# Patient Record
Sex: FEMALE | Race: WHITE | Employment: OTHER | URBAN - METROPOLITAN AREA
[De-identification: names, ages, dates, MRNs, and addresses within clinical notes are randomized per-mention and may not be internally consistent; named-entity substitution may affect disease eponyms.]

---

## 2017-01-03 ENCOUNTER — HOSPITAL ENCOUNTER (OUTPATIENT)
Dept: PHYSICAL THERAPY | Age: 77
Discharge: HOME | End: 2017-01-03
Payer: MEDICARE

## 2017-01-03 PROCEDURE — 97110 THERAPEUTIC EXERCISES: CPT

## 2017-01-03 PROCEDURE — 97140 MANUAL THERAPY 1/> REGIONS: CPT

## 2017-01-03 NOTE — PROGRESS NOTES
PHYSICAL THERAPY - DAILY TREATMENT NOTE    Patient Name: Yaquelin Negrete        Date: 1/3/2017  : 1940    Patient  Verified: YES  Visit #:   5   of      Insurance: Payor: [place] [employer] / Plan: [place] MEDICARE PART A & B / Product Type: Medicare /      In time: 2 Out time: 1125   Total Treatment Time: 55     Medicare Time Tracking (below)   Total Timed Codes (min):  40 1:1 Treatment Time:  40     TREATMENT AREA/ DIAGNOSIS = Left knee pain [M25.562]    SUBJECTIVE  Pain Level (on 0 to 10 scale):  7  / 10   Medication Changes/New allergies or changes in medical history, any new surgeries or procedures?     NO    If yes, update Summary List   Subjective Functional Status/Changes:  []  No changes reported     Functional improvement:  Driving   Functional limitation:  stairs     OBJECTIVE  Modalities Rationale:   x decrease edema/ inflammation  x decrease pain  x  increase tissue extensibility    x increase muscle performance    decrease neural compromise  to improve patient's ability to   x perform ADLs   x  ambulate  x perform work    relaxation/ sleep      min [] Estim, type/location:                                      []  att     []  unatt     []  w/US     []  w/ice    []  w/heat    min []  Mechanical Traction: type/lbs                   []  pro   []  sup   []  int   []  cont    []  before manual    []  after manual    min []  Ultrasound, settings/location:      min []  Iontophoresis w/ dexamethasone, location:                                               []  take home patch       []  in clinic   10 min x Ice        Heat    location/position:     min []  Vasopneumatic Device, press/temp:     min []  Other:    [] Skin assessment post-treatment (if applicable):    []  intact    []  redness- no adverse reaction     []redness  adverse reaction:        30 min Therapeutic Exercise:  [x]  See flow sheet   Rationale:   x increase ROM   x increase strength   x increase endurance     x increase motor control Other  to improve patients ability to x perform ADLs   x ambulate   x perform work    relaxation/ sleep     15 min Manual Therapy: Technique:        x STM    ASTM    TPR  x PROM    x Stretching     Jt manipulation  x Gr I.   x II.    x  III. IV.     V.  Treatment Area: Passive Range of Motion for flexion and extension. Extension and Patellar mobilization. Other:   Rationale:   x decrease pain   x decrease TP   x increase ROM/ mobility   x increase tissue extensibility       decrease edema     reduce disc   postural correction   other     to improve patient's ability to  x perform ADLs    x ambulate   x perform work     relaxation/ sleep        min Therapeutic Activity:  [x] see flow sheet   Rationale:  x increase ROM    x increase strength    x increase balance/ proprioception    x increase motor control    other   to improve patients ability to   x perform ADLs   x ambulate   x perform work                relaxation/ sleep       min Neuromuscular Re-ed:  [x] see flow sheet   Rationale:   x increase ROM    x increase strength    x increase balance/ proprioception  x increase motor control    x improve safety   other    to improve patients ability to   x perform ADLs    x ambulate   x perform work    relaxation/ sleep                min Gait Training: To improve patients ability to:    [] perform ADLs   [] ambulate       min Patient Education:  Yes    [x] Reviewed HEP   []  Progressed/Changed HEP based on: Other Objective/Functional Measures:    PROM 3-98 deg  Patient education for postural awareness, body mechanics, and lifting techniques        Post Treatment Pain Level (on 0 to 10) scale:   6  / 10     ASSESSMENT  []  See Progress Note/Recertification      Patient will continue to benefit from skilled PT services to modify and progress therapeutic interventions, address functional mobility deficits, address ROM deficits and address strength deficits to attain remaining goals.   Pt will be independent and compliant with HEP.   States compliance and ind   Progress toward goals / Updated goals:    [x] decr pain   [x]inc stability   []inc balance [x] inc ROM[x] inc strength  [] centralizing symptoms                    [x] progressing  Function  [x] progressing towards LTGs            [x]  progressing HEP       PLAN  [x]  Upgrade activities as tolerated YES Continue plan of care   []  Discharge due to :    []  Other:       Therapist: Maximilian Lozoya PTA    Date: 1/3/2017 Time: 5:45 PM

## 2017-01-04 ENCOUNTER — HOSPITAL ENCOUNTER (OUTPATIENT)
Dept: PHYSICAL THERAPY | Age: 77
Discharge: HOME | End: 2017-01-04
Payer: MEDICARE

## 2017-01-04 PROCEDURE — 97530 THERAPEUTIC ACTIVITIES: CPT

## 2017-01-04 PROCEDURE — 97110 THERAPEUTIC EXERCISES: CPT

## 2017-01-04 PROCEDURE — 97140 MANUAL THERAPY 1/> REGIONS: CPT

## 2017-01-04 NOTE — PROGRESS NOTES
PHYSICAL THERAPY - DAILY TREATMENT NOTE    Patient Name: Patricia Rodriguez        Date: 2017  : 1940    Patient  Verified: YES  Visit #:   6   of      Insurance: Payor: Linder Cheadle / Plan: [place] MEDICARE PART A & B / Product Type: Medicare /      In time: 10 Out time: 1110   Total Treatment Time: 70     Medicare Time Tracking (below)   Total Timed Codes (min):  60 1:1 Treatment Time:  55     TREATMENT AREA/ DIAGNOSIS = Left knee pain [M25.562]    SUBJECTIVE  Pain Level (on 0 to 10 scale):  7  / 10   Medication Changes/New allergies or changes in medical history, any new surgeries or procedures?     NO    If yes, update Summary List   Subjective Functional Status/Changes:  []  No changes reported     Functional improvement:  Easier getting in and out of the car  Functional limitation:  stairs     OBJECTIVE  Modalities Rationale:   x decrease edema/ inflammation  x decrease pain  x  increase tissue extensibility    x increase muscle performance    decrease neural compromise  to improve patient's ability to   x perform ADLs   x  ambulate  x perform work    relaxation/ sleep      min [] Estim, type/location:                                      []  att     []  unatt     []  w/US     []  w/ice    []  w/heat    min []  Mechanical Traction: type/lbs                   []  pro   []  sup   []  int   []  cont    []  before manual    []  after manual    min []  Ultrasound, settings/location:      min []  Iontophoresis w/ dexamethasone, location:                                               []  take home patch       []  in clinic   10 min x Ice        Heat    location/position:     min []  Vasopneumatic Device, press/temp:     min []  Other:    [] Skin assessment post-treatment (if applicable):    []  intact    []  redness- no adverse reaction     []redness  adverse reaction:        30 min Therapeutic Exercise:  [x]  See flow sheet   Rationale:   x increase ROM   x increase strength   x increase endurance     x increase motor control    Other  to improve patients ability to x perform ADLs   x ambulate   x perform work    relaxation/ sleep     15 min Manual Therapy: Technique:        x STM    ASTM    TPR  x PROM    x Stretching     Jt manipulation  x Gr I.   x II.    x  III. IV.     V.  Treatment Area: Passive Range of Motion for flexion and extension. Extension and Patellar mobilization. Other:   Rationale:   x decrease pain   x decrease TP   x increase ROM/ mobility   x increase tissue extensibility       decrease edema     reduce disc   postural correction   other     to improve patient's ability to  x perform ADLs    x ambulate   x perform work     relaxation/ sleep      15  min Therapeutic Activity:  [x] see flow sheet   Rationale:  x increase ROM    x increase strength    x increase balance/ proprioception    x increase motor control    other   to improve patients ability to   x perform ADLs   x ambulate   x perform work                relaxation/ sleep       min Neuromuscular Re-ed:  [x] see flow sheet   Rationale:   x increase ROM    x increase strength    x increase balance/ proprioception  x increase motor control    x improve safety   other    to improve patients ability to   x perform ADLs    x ambulate   x perform work    relaxation/ sleep                min Gait Training: To improve patients ability to:    [] perform ADLs   [] ambulate       min Patient Education:  Yes    [x] Reviewed HEP   []  Progressed/Changed HEP based on:         Other Objective/Functional Measures:    PROM 3-98 deg   Patient education for postural awareness, body mechanics, and lifting techniques        Post Treatment Pain Level (on 0 to 10) scale:   6 / 10     ASSESSMENT  []  See Progress Note/Recertification      Patient will continue to benefit from skilled PT services to modify and progress therapeutic interventions, address functional mobility deficits, address ROM deficits and address strength deficits to attain remaining goals. Pt will be independent and compliant with HEP.   States compliance and ind   Progress toward goals / Updated goals:    [x] decr pain   [x]inc stability   []inc balance [x] inc ROM[x] inc strength  [] centralizing symptoms                    [x] progressing  Function  [x] progressing towards LTGs            [x]  progressing HEP       PLAN  [x]  Upgrade activities as tolerated YES Continue plan of care   []  Discharge due to :    []  Other:       Therapist: Gabriel Bird PTA    Date: 1/4/2017 Time: 5:45 PM

## 2017-01-06 ENCOUNTER — HOSPITAL ENCOUNTER (OUTPATIENT)
Dept: PHYSICAL THERAPY | Age: 77
Discharge: HOME | End: 2017-01-06
Payer: MEDICARE

## 2017-01-06 PROCEDURE — 97140 MANUAL THERAPY 1/> REGIONS: CPT

## 2017-01-06 PROCEDURE — 97110 THERAPEUTIC EXERCISES: CPT

## 2017-01-06 PROCEDURE — 97116 GAIT TRAINING THERAPY: CPT

## 2017-01-06 NOTE — PROGRESS NOTES
PHYSICAL THERAPY - DAILY TREATMENT NOTE    Patient Name: Andreia Yates        Date: 2017  : 1940   yes Patient  Verified  Visit #:   6   of   -  Insurance: Payor: [employer] MEDICARE / Plan: [employer] MEDICARE PART A & B / Product Type: Medicare /      In time: 1000 Out time: 509   Total Treatment Time: 65 mins     Medicare Time Tracking (below)   Total Timed Codes (min):  55 1:1 Treatment Time:  55 mins     TREATMENT AREA =  Left knee pain [M25.562]    SUBJECTIVE  Pain Level (on 0 to 10 scale):  5  / 10   Medication Changes/New allergies or changes in medical history, any new surgeries or procedures?    no  If yes, update Summary List   Subjective Functional Status/Changes:  -  No changes reported   Went to get a manicure this AM and had a lot of pain in her L quadricep when standing up. Has not taken her Mobic yet this AM, took 3 tylenol. Does not take any pain meds at night and has difficulty getting comfortable. C/o of dull, achy constant pain. OBJECTIVE  Modalities Rationale:     decrease edema and decrease inflammation to improve patient's ability to complete ADLs and ROM.    min [] Estim, type/location:                                      []  att     []  unatt     []  w/US     []  w/ice    []  w/heat    min []  Mechanical Traction: type/lbs                   []  pro   []  sup   []  int   []  cont    []  before manual    []  after manual    min []  Ultrasound, settings/location:      min []  Iontophoresis w/ dexamethasone, location:                                               []  take home patch       []  in clinic   10 min [x]  Ice     []  Heat    location/position:     min []  Vasopneumatic Device, press/temp:     min []  Other:    [] Skin assessment post-treatment (if applicable):    [x]  intact    []  redness- no adverse reaction     []redness  adverse reaction:        25 min Therapeutic Exercise:  [x]  See flow sheet   Rationale:      increase ROM, improve balance and increase proprioception to improve the patients ability to increase ROM, strength, and endurance. 15 min Manual Therapy: Scar massage; patellar mobs - grade II-III; PROM knee flexion, extension. MLD to L inguinals, medial to lateral thigh, and distal to proximal LE. Rationale:      decrease pain, increase ROM, increase tissue extensibility and decrease trigger points to improve patient's ability to perform ADLs. 15 min Gait Training: Tandem walking with 1 UE support. Increased width of ANH for gait. Rationale:  Improve patient's ability to ambulate with increased stability and decrease compensatory strategies of gait. min Patient Education:  yes  Reviewed HEP   [x]  Progressed/Changed HEP based on: Added tandem balance with 2 UE support. Other Objective/Functional Measures:    Ecchymosis decreasing in medial knee, mild warmth over knee. No redness.     Girth (cm) Right Left   6 in above -- 47.6   4 in above 42.4 45.1   2 in above 40.4 44   Midpatellar line 39.3 41.4   2 in below 35.6 39.2   6 in below 29.3 34.9   8 in below -- 31      AROM RIGHT LEFT   Knee flexion  100°   Knee extension   -6°      PROM RIGHT LEFT   Knee flexion  100°   Knee extension  -4°      Post Treatment Pain Level (on 0 to 10) scale:   0  / 10     ASSESSMENT  Assessment/Changes in Function:   Pt demonstrates an excellent improvement of L knee edema and improved ROM. Pt completing all ADLs independently and using no AD for ambulation. []  See Progress Note/Recertification   Patient will continue to benefit from skilled PT services to modify and progress therapeutic interventions, address functional mobility deficits, address ROM deficits, address strength deficits, analyze and address soft tissue restrictions and analyze and cue movement patterns to attain remaining goals. Progress toward goals / Updated goals:  Pain reported as 0/10 at the end of treatment today. Improved ROM and decreased edema overall.  HEP compliance limited - discussed ways to improve compliance.      PLAN  [x]  Upgrade activities as tolerated yes Continue plan of care   []  Discharge due to :    []  Other:      Therapist: Bar Blake PT    Date: 1/6/2017 Time: 10:01 AM     Future Appointments  Date Time Provider Jim Pendleton   1/9/2017 11:30 AM Bar Blake PT [facility]   1/11/2017 10:30 AM Tang Cason PTA [facility]   1/13/2017 10:30 AM Tang Cason PTA [facility]   1/16/2017 10:30 AM Tang Cason PTA [facility]   1/18/2017 10:30 AM Tang Cason PTA [facility]   1/20/2017 10:30 AM Tang Cason PTA [facility]   1/23/2017 1:30 PM Bar Blake PT [facility]   1/24/2017 10:30 AM Tang Cason PTA [facility]   1/27/2017 10:30 AM Tang Cason PTA [facility]   1/30/2017 11:30 AM Bar Blake PT [facility]

## 2017-01-11 ENCOUNTER — HOSPITAL ENCOUNTER (OUTPATIENT)
Dept: PHYSICAL THERAPY | Age: 77
Discharge: HOME | End: 2017-01-11
Payer: MEDICARE

## 2017-01-11 PROCEDURE — 97110 THERAPEUTIC EXERCISES: CPT

## 2017-01-11 PROCEDURE — 97140 MANUAL THERAPY 1/> REGIONS: CPT

## 2017-01-11 PROCEDURE — 97112 NEUROMUSCULAR REEDUCATION: CPT

## 2017-01-11 NOTE — PROGRESS NOTES
PHYSICAL THERAPY - DAILY TREATMENT NOTE    Patient Name: Patricia Rodriguez        Date: 2017  : 1940    Patient  Verified: YES  Visit #:   8   of      Insurance: Payor: Linder Cheadle / Plan: [facility] MEDICARE PART A & B / Product Type: Medicare /      In time:  Out time: 1140   Total Treatment Time: 70     Medicare Time Tracking (below)   Total Timed Codes (min):  60 1:1 Treatment Time:  55     TREATMENT AREA/ DIAGNOSIS = Left knee pain [M25.562]    SUBJECTIVE  Pain Level (on 0 to 10 scale):  7  / 10   Medication Changes/New allergies or changes in medical history, any new surgeries or procedures?     NO    If yes, update Summary List   Subjective Functional Status/Changes:  []  No changes reported     Functional improvement:  Walking with it straighter   Functional limitation:  Sore from just staying in the house the last few days     OBJECTIVE  Modalities Rationale:   x decrease edema/ inflammation  x decrease pain  x  increase tissue extensibility    x increase muscle performance    decrease neural compromise  to improve patient's ability to   x perform ADLs   x  ambulate  x perform work    relaxation/ sleep      min [] Estim, type/location:                                      []  att     []  unatt     []  w/US     []  w/ice    []  w/heat    min []  Mechanical Traction: type/lbs                   []  pro   []  sup   []  int   []  cont    []  before manual    []  after manual    min []  Ultrasound, settings/location:      min []  Iontophoresis w/ dexamethasone, location:                                               []  take home patch       []  in clinic   10 min x Ice        Heat    location/position:     min []  Vasopneumatic Device, press/temp:     min []  Other:    [] Skin assessment post-treatment (if applicable):    []  intact    []  redness- no adverse reaction     []redness  adverse reaction:        30 min Therapeutic Exercise:  [x]  See flow sheet   Rationale:   x increase ROM   x increase strength   x increase endurance     x increase motor control    Other  to improve patients ability to x perform ADLs   x ambulate   x perform work    relaxation/ sleep     15 min Manual Therapy: Technique:        x STM    ASTM    TPR  x PROM    x Stretching     Jt manipulation  x Gr I.   x II.    x  III. IV.     V.  Treatment Area: Passive Range of Motion for flexion and extension. Extension and Patellar mobilization. Other:   Rationale:   x decrease pain   x decrease TP   x increase ROM/ mobility   x increase tissue extensibility       decrease edema     reduce disc   postural correction   other     to improve patient's ability to  x perform ADLs    x ambulate   x perform work     relaxation/ sleep      15  min Therapeutic Activity:  [x] see flow sheet   Rationale:  x increase ROM    x increase strength    x increase balance/ proprioception    x increase motor control    other   to improve patients ability to   x perform ADLs   x ambulate   x perform work                relaxation/ sleep       min Neuromuscular Re-ed:  [x] see flow sheet   Rationale:   x increase ROM    x increase strength    x increase balance/ proprioception  x increase motor control    x improve safety   other    to improve patients ability to   x perform ADLs    x ambulate   x perform work    relaxation/ sleep                min Gait Training: To improve patients ability to:    [] perform ADLs   [] ambulate       min Patient Education:  Yes    [x] Reviewed HEP   []  Progressed/Changed HEP based on:         Other Objective/Functional Measures:    PROM 2-105 deg   Patient education for postural awareness, body mechanics, and lifting techniques        Post Treatment Pain Level (on 0 to 10) scale:   3  / 10     ASSESSMENT  []  See Progress Note/Recertification      Patient will continue to benefit from skilled PT services to modify and progress therapeutic interventions, address functional mobility deficits, address ROM deficits and address strength deficits to attain remaining goals. Pt will be independent and compliant with HEP.   States compliance and ind   Progress toward goals / Updated goals:    [x] decr pain   [x]inc stability   []inc balance [x] inc ROM[x] inc strength  [] centralizing symptoms                    [x] progressing  Function  [x] progressing towards LTGs            [x]  progressing HEP       PLAN  [x]  Upgrade activities as tolerated YES Continue plan of care   []  Discharge due to :    []  Other:       Therapist: Krupa Rodney PTA    Date: 1/11/2017 Time: 5:45 PM

## 2017-01-13 ENCOUNTER — HOSPITAL ENCOUNTER (OUTPATIENT)
Dept: PHYSICAL THERAPY | Age: 77
Discharge: HOME | End: 2017-01-13
Payer: MEDICARE

## 2017-01-13 PROCEDURE — 97140 MANUAL THERAPY 1/> REGIONS: CPT

## 2017-01-13 PROCEDURE — 97112 NEUROMUSCULAR REEDUCATION: CPT

## 2017-01-13 PROCEDURE — 97110 THERAPEUTIC EXERCISES: CPT

## 2017-01-13 NOTE — PROGRESS NOTES
PHYSICAL THERAPY - DAILY TREATMENT NOTE    Patient Name: Liu Chapa        Date: 2017  : 1940    Patient  Verified: YES  Visit #:   9   of      Insurance: Payor: Beckie Jimenez / Plan: [facility] MEDICARE PART A & B / Product Type: Medicare /      In time: 5750 Out time: 12   Total Treatment Time: 75     Medicare Time Tracking (below)   Total Timed Codes (min):  65 1:1 Treatment Time:  55     TREATMENT AREA/ DIAGNOSIS = Left knee pain [M25.562]    SUBJECTIVE  Pain Level (on 0 to 10 scale):  7  / 10   Medication Changes/New allergies or changes in medical history, any new surgeries or procedures?     NO    If yes, update Summary List   Subjective Functional Status/Changes:  []  No changes reported     Functional improvement:  Walking with it straighter   Functional limitation:  Sore from just staying in the house the last few days     OBJECTIVE  Modalities Rationale:   x decrease edema/ inflammation  x decrease pain  x  increase tissue extensibility    x increase muscle performance    decrease neural compromise  to improve patient's ability to   x perform ADLs   x  ambulate  x perform work    relaxation/ sleep      min [] Estim, type/location:                                      []  att     []  unatt     []  w/US     []  w/ice    []  w/heat    min []  Mechanical Traction: type/lbs                   []  pro   []  sup   []  int   []  cont    []  before manual    []  after manual    min []  Ultrasound, settings/location:      min []  Iontophoresis w/ dexamethasone, location:                                               []  take home patch       []  in clinic   10 min x Ice        Heat    location/position:     min []  Vasopneumatic Device, press/temp:     min []  Other:    [] Skin assessment post-treatment (if applicable):    []  intact    []  redness- no adverse reaction     []redness  adverse reaction:        30 min Therapeutic Exercise:  [x]  See flow sheet   Rationale:   x increase ROM   x increase strength   x increase endurance     x increase motor control    Other  to improve patients ability to x perform ADLs   x ambulate   x perform work    relaxation/ sleep     15 min Manual Therapy: Technique:        x STM    ASTM    TPR  x PROM    x Stretching     Jt manipulation  x Gr I.   x II.    x  III. IV.     V.  Treatment Area: Passive Range of Motion for flexion and extension. Extension and Patellar mobilization. Other:   Rationale:   x decrease pain   x decrease TP   x increase ROM/ mobility   x increase tissue extensibility       decrease edema     reduce disc   postural correction   other     to improve patient's ability to  x perform ADLs    x ambulate   x perform work     relaxation/ sleep        min Therapeutic Activity:  [x] see flow sheet   Rationale:  x increase ROM    x increase strength    x increase balance/ proprioception    x increase motor control    other   to improve patients ability to   x perform ADLs   x ambulate   x perform work                relaxation/ sleep     20  min Neuromuscular Re-ed:  [x] see flow sheet   Rationale:   x increase ROM    x increase strength    x increase balance/ proprioception  x increase motor control    x improve safety   other    to improve patients ability to   x perform ADLs    x ambulate   x perform work    relaxation/ sleep                min Gait Training: To improve patients ability to:    [] perform ADLs   [] ambulate       min Patient Education:  Yes    [x] Reviewed HEP   []  Progressed/Changed HEP based on: Other Objective/Functional Measures:     Added TG squats   Patient education for postural awareness, body mechanics, and lifting techniques        Post Treatment Pain Level (on 0 to 10) scale:   3  / 10     ASSESSMENT  []  See Progress Note/Recertification      Patient will continue to benefit from skilled PT services to modify and progress therapeutic interventions, address functional mobility deficits, address ROM deficits and address strength deficits to attain remaining goals. Pt will be independent and compliant with HEP.   States compliance and ind   Progress toward goals / Updated goals:    [x] decr pain   [x]inc stability   []inc balance [x] inc ROM[x] inc strength  [] centralizing symptoms                    [x] progressing  Function  [x] progressing towards LTGs            [x]  progressing HEP       PLAN  [x]  Upgrade activities as tolerated YES Continue plan of care   []  Discharge due to :    []  Other:       Therapist: Valerie Parham PTA    Date: 1/13/2017 Time: 5:45 PM

## 2017-01-16 ENCOUNTER — HOSPITAL ENCOUNTER (OUTPATIENT)
Dept: PHYSICAL THERAPY | Age: 77
Discharge: HOME | End: 2017-01-16
Payer: MEDICARE

## 2017-01-16 PROCEDURE — 97140 MANUAL THERAPY 1/> REGIONS: CPT

## 2017-01-16 PROCEDURE — 97112 NEUROMUSCULAR REEDUCATION: CPT

## 2017-01-16 PROCEDURE — 97110 THERAPEUTIC EXERCISES: CPT

## 2017-01-18 ENCOUNTER — HOSPITAL ENCOUNTER (OUTPATIENT)
Dept: PHYSICAL THERAPY | Age: 77
Discharge: HOME | End: 2017-01-18
Payer: MEDICARE

## 2017-01-18 PROCEDURE — 97140 MANUAL THERAPY 1/> REGIONS: CPT

## 2017-01-18 PROCEDURE — 97110 THERAPEUTIC EXERCISES: CPT

## 2017-01-18 NOTE — PROGRESS NOTES
PHYSICAL THERAPY - DAILY TREATMENT NOTE    Patient Name: Cabrera Vargas        Date: 2017  : 1940    Patient  Verified: YES  Visit #:   10   of   16  Insurance: Payor: Munir Weber / Plan: [place] MED[place] A & B / Product Type: Medicare /      In time: 10:35 Out time: 11:30   Total Treatment Time: 54     Medicare Time Tracking (below)   Total Timed Codes (min):  45 1:1 Treatment Time:  25     TREATMENT AREA/ DIAGNOSIS = Left knee pain [M25.562]    SUBJECTIVE  Pain Level (on 0 to 10 scale):  0  / 10   Medication Changes/New allergies or changes in medical history, any new surgeries or procedures?     NO    If yes, update Summary List   Subjective Functional Status/Changes:  []  No changes reported     Functional improvement    Functional limitation i feel like I wasted this first month      OBJECTIVE  Modalities Rationale:     decrease edema, decrease inflammation and decrease pain to improve patient's ability to perform ADLs without pain   min [] Estim, type/location:                                      []  att     []  unatt     []  w/US     []  w/ice    []  w/heat    min []  Mechanical Traction: type/lbs                   []  pro   []  sup   []  int   []  cont    []  before manual    []  after manual    min []  Ultrasound, settings/location:      min []  Iontophoresis w/ dexamethasone, location:                                               []  take home patch       []  in clinic   10 min [x]  Ice     []  Heat    location/position:     min []  Vasopneumatic Device, press/temp:     min []  Other:    [] Skin assessment post-treatment (if applicable):    []  intact    []  redness- no adverse reaction     []redness  adverse reaction:        10 min Manual Therapy: CFM to scar, patellar mobs, DTM to L quad, passive extension   Rationale:      decrease pain, increase ROM, increase tissue extensibility and decrease edema  to improve patient's ability to perform ADLs without pain    35 min Therapeutic Exercise:  [x]  See flow sheet   Rationale:      increase ROM, increase strength and improve coordination to improve the patients ability to perform ADLs without pain          min Patient Education:  Yes    [x] Reviewed HEP   []  Progressed/Changed HEP based on: Other Objective/Functional Measures:    Pt with - 10 degrees of active extension, able to get full active ext after adding ext hang and manual  AAROM flex to 113 degrees   Post Treatment Pain Level (on 0 to 10) scale:   0  / 10     ASSESSMENT  Assessment/Changes in Function:     Needs to work on extension     []  See Progress Note/Recertification   Patient will continue to benefit from skilled PT services to modify and progress therapeutic interventions, address functional mobility deficits, address ROM deficits, address strength deficits, analyze and address soft tissue restrictions, analyze and cue movement patterns, analyze and modify body mechanics/ergonomics and assess and modify postural abnormalities to attain remaining goals.    Progress toward goals / Updated goals:    Walking with good heel strike after therapy     PLAN  [x]  Upgrade activities as tolerated YES Continue plan of care   []  Discharge due to :    []  Other:      Therapist: Susana Limon PT    Date: 1/18/2017 Time: 2:49 PM        Future Appointments  Date Time Provider Jim Pendleton   1/20/2017 12:00 PM Susana Limon PT REHAB [facility] AT [facility]   1/23/2017 1:30 PM Al Herron PT REHAB CENTER AT [facility]   1/24/2017 11:30 AM Susana Limon PT REHAB CENTER AT [facility]   1/27/2017 10:30 AM Sher Jones PTA REHAB CENTER AT [facility]   1/30/2017 11:30 AM Al Herron PT REHAB CENTER AT [facility]

## 2017-01-20 ENCOUNTER — HOSPITAL ENCOUNTER (OUTPATIENT)
Dept: PHYSICAL THERAPY | Age: 77
Discharge: HOME | End: 2017-01-20
Payer: MEDICARE

## 2017-01-20 PROCEDURE — G8979 MOBILITY GOAL STATUS: HCPCS

## 2017-01-20 PROCEDURE — 97140 MANUAL THERAPY 1/> REGIONS: CPT

## 2017-01-20 PROCEDURE — 97110 THERAPEUTIC EXERCISES: CPT

## 2017-01-20 PROCEDURE — G8978 MOBILITY CURRENT STATUS: HCPCS

## 2017-01-20 NOTE — PROGRESS NOTES
PHYSICAL THERAPY - DAILY TREATMENT NOTE    Patient Name: Yasmani Denson        Date: 2017  : 1940    Patient  Verified: YES  Visit #:     Insurance: Payor: Chely Montano / Plan: [place] MEDICARE PART A & B / Product Type: Medicare /      In time: 12:15 Out time: 1:05   Total Treatment Time: 50     Medicare Time Tracking (below)   Total Timed Codes (min):  50 1:1 Treatment Time:  50     TREATMENT AREA/ DIAGNOSIS = Left knee pain [M25.562]    SUBJECTIVE  Pain Level (on 0 to 10 scale):  3  / 10   Medication Changes/New allergies or changes in medical history, any new surgeries or procedures?     NO    If yes, update Summary List   Subjective Functional Status/Changes:  []  No changes reported     Functional improvement    Functional limitation i was sore and swollen after the last visit       OBJECTIVE  Modalities Rationale:     decrease edema, decrease inflammation and decrease pain to improve patient's ability to -   min [] Estim, type/location:                                      []  att     []  unatt     []  w/US     []  w/ice    []  w/heat    min []  Mechanical Traction: type/lbs                   []  pro   []  sup   []  int   []  cont    []  before manual    []  after manual    min []  Ultrasound, settings/location:      min []  Iontophoresis w/ dexamethasone, location:                                               []  take home patch       []  in clinic   PD min []  Ice     []  Heat    location/position:     min []  Vasopneumatic Device, press/temp:     min []  Other:    [] Skin assessment post-treatment (if applicable):    []  intact    []  redness- no adverse reaction     []redness  adverse reaction:        10 min Manual Therapy: Retrograde massage, DTM to L knee CFM to incision, passive L knrr ext    Rationale:      decrease pain, increase ROM, increase tissue extensibility and decrease edema  to improve patient's ability to perform ADLs without pain    40 min Therapeutic Exercise:  [x] See flow sheet   Rationale:      increase ROM and increase strength to improve the patients ability to perform ADLs without pain          min Patient Education:  Yes    [x] Reviewed HEP   []  Progressed/Changed HEP based on: Other Objective/Functional Measures:    Pt with increased pain and edema after her last visit, with more aggressive WB PREs  See 701/recert   Post Treatment Pain Level (on 0 to 10) scale:   2  / 10     ASSESSMENT  Assessment/Changes in Function:     See 701/recert     [x]  See Progress Note/Recertification   Patient will continue to benefit from skilled PT services to modify and progress therapeutic interventions, address functional mobility deficits, address ROM deficits, address strength deficits, analyze and address soft tissue restrictions, analyze and cue movement patterns and analyze and modify body mechanics/ergonomics to attain remaining goals.    Progress toward goals / Updated goals:    See 701/recert     PLAN  [x]  Upgrade activities as tolerated YES Continue plan of care   []  Discharge due to :    []  Other:      Therapist: Carlos Carlson PT    Date: 1/20/2017 Time: 4:38 PM        Future Appointments  Date Time Provider Jim Pendleton   1/23/2017 11:00 AM Benito Ospina PT [facility] AT [facility]   1/24/2017 11:30 AM Carlos Carlson PT [facility] AT [facility]   1/27/2017 10:30 AM Alanna Mccarthy PTA REHAB [facility]ENTER AT [facility]   1/30/2017 11:30 AM Benito Ospina PT RE[facility] AT [facility]

## 2017-01-20 NOTE — PROGRESS NOTES
Luis Diadema  PHYSICAL THERAPY AT [address] [place] [address], [address] [place] [address], [address], [address] [place] [address]  Phone: [phone]  Fax: [phone] OF CARE/RECERTIFICATION FOR PHYSICAL THERAPY          Patient Name: Liu Chapa : 1940   Treatment/Medical Diagnosis: Left knee pain [M25.562]   Onset Date: 16    Referral Source: Evelyn Fernandez MD Start of Care [facility]): 16   Prior Hospitalization: See Medical History Provider #: [id number]   Prior Level of Function: Independent with ADLs   Comorbidities: OA, UI, sciatica, Sleep dysfunction   Medications: Verified on Patient Summary List   Visits from [facility]: 12 Missed Visits: 0     Goal/Measure of Progress Goal Met? 1.  L knee AROM WNLs   Status at last Eval: -15 to 79 degrees Current Status: -4 to 105 progressing   2. Pt with FOTO score of > 54   Status at last Eval: 47 Current Status: NT progressing   3. Pt to return to previous exercise level, including water aerobics    Status at last Eval:  Current Status: Unable  progressing     Key Functional Changes/Progress: improved gait and decreased pain levels. Problem List: pain affecting function, decrease ROM, decrease strength, edema affecting function, impaired gait/ balance, decrease ADL/ functional abilitiies and decrease activity tolerance   Treatment Plan may include any combination of the following: Therapeutic exercise, Therapeutic activities, Neuromuscular re-education, Physical agent/modality, Gait/balance training, Manual therapy and Patient education  Patient Goal(s) has been updated and includes:      Goals for this certification period include and are to be achieved in   4  weeks:  1. Increase strength to allow recipricol gait on stairs  2. Increase FOTO score to > 60, to indicate increased function  3.   Increase L knee AROM to > 1 to 115 degrees   Frequency / Duration:   Patient to be seen   2-3   times per week for   4    weeks:  G-Codes (GP): Mobility: [id number] Current  CK= 40-59%   Goal  CJ= 20-39%. The severity rating is based on the FOTO Score  Assessments/Recommendations: continue PT  If you have any questions/comments please contact us directly at [phone]. Thank you for allowing us to assist in the care of your patient. Therapist Signature: Lauren Cyr PT, MDT Date: 4/99/8963   Certification Period:  Reporting Period: 12/19/16-3/23/17  12.19.16-1.20.17 Time: 4:44 PM   NOTE TO PHYSICIAN:  PLEASE COMPLETE THE ORDERS BELOW AND FAX TO   [facility] [place]: [phone]. If you are unable to process this request in 24 hours please contact our office: [phone].    ___ I have read the above report and request that my patient continue as recommended.   ___ I have read the above report and request that my patient continue therapy with the following changes/special instructions: ________________________________________________   ___ I have read the above report and request that my patient be discharged from therapy.      Physician Signature:        Date:       Time:

## 2017-01-23 ENCOUNTER — HOSPITAL ENCOUNTER (OUTPATIENT)
Dept: PHYSICAL THERAPY | Age: 77
Discharge: HOME | End: 2017-01-23
Payer: MEDICARE

## 2017-01-23 PROCEDURE — 97140 MANUAL THERAPY 1/> REGIONS: CPT

## 2017-01-23 PROCEDURE — 97110 THERAPEUTIC EXERCISES: CPT

## 2017-01-23 NOTE — PROGRESS NOTES
PHYSICAL THERAPY - DAILY TREATMENT NOTE    Patient Name: Andreia Yates        Date: 2017  : 1940   yes Patient  Verified  Visit #:     Insurance: Payor: [place] / Plan: [place] MEDICARE PART A & B / Product Type: Medicare /      In time: 427 Out time:    Total Treatment Time: 75 mins     Medicare Time Tracking (below)   Total Timed Codes (min):  65  1:1 Treatment Time:  65 mins     TREATMENT AREA =  Left knee pain [M25.562]    SUBJECTIVE  Pain Level (on 0 to 10 scale):  3  / 10   Medication Changes/New allergies or changes in medical history, any new surgeries or procedures?    no  If yes, update Summary List   Subjective Functional Status/Changes:  []  No changes reported   States she really likes her new exercise program but does note increased pain in the L anteromedial aspect of knee and L medial hamstring. Swelling is worse after exercises, icing at home. Most of the day she is out running errands, has not done MLD massage.      OBJECTIVE  Modalities Rationale:     decrease edema, decrease inflammation and decrease pain to improve patient's ability to complete ADLs painfree.   min [] Estim, type/location:                                      []  att     []  unatt     []  w/US     []  w/ice    []  w/heat    min []  Mechanical Traction: type/lbs                   []  pro   []  sup   []  int   []  cont    []  before manual    []  after manual    min []  Ultrasound, settings/location:      min []  Iontophoresis w/ dexamethasone, location:                                               []  take home patch       []  in clinic   10 min [x]  Ice     []  Heat    location/position:     min []  Vasopneumatic Device, press/temp:     min []  Other:    [] Skin assessment post-treatment (if applicable):    [x]  intact    []  redness- no adverse reaction     []redness  adverse reaction:        35 min Therapeutic Exercise:  [x]  See flow sheet   Rationale:      increase ROM, increase strength, improve coordination and improve balance to improve the patients ability to perform ADLs painfree. 30 min Manual Therapy: Scar massage; patellar mobs - grade II-III; PROM knee flexion, extension. MLD to L inguinals, medial to lateral thigh, and distal to proximal LE. Rationale:      decrease pain, increase ROM, increase tissue extensibility and decrease edema  to improve patient's ability to perform ADLs. 10 min Patient Education:  yes  Reviewed HEP   [x]  Progressed/Changed HEP based on: Added scar tissue massage with vitamin E oil and seated hamstring stretch. Review of aquatic exercises. Other Objective/Functional Measures:  Mod-severe hypomobility of scar on L knee, scar intact. Moderate edema persists. Post Treatment Pain Level (on 0 to 10) scale:   0  / 10     ASSESSMENT  Assessment/Changes in Function:     Pt has a f/u c surgeon on Wednesday; notes increased edema post-rx. Had increase of intensity of PREs may contribute to findings. Moderate decrease of flexibility through medial hams. []  See Progress Note/Recertification   Patient will continue to benefit from skilled PT services to modify and progress therapeutic interventions, address functional mobility deficits, address ROM deficits, address strength deficits, analyze and address soft tissue restrictions and instruct in home and community integration to attain remaining goals. Progress toward goals / Updated goals:    Overall decrease of edema in L knee.       PLAN  [x]  Upgrade activities as tolerated yes Continue plan of care   []  Discharge due to :    []  Other:      Therapist: Cheyanne Jerry PT    Date: 1/23/2017 Time: 4:31 PM     Future Appointments  Date Time Provider Jim Pendleton   1/24/2017 11:30 AM Dmitri Macrus PT REHAB CENTER AT [facility]   1/27/2017 10:30 AM Gabriel Bird PTA REHAB CENTER AT [facility]   1/30/2017 11:30 AM Cheyanne Jerry PT REHAB CENTER AT [facility]   2/1/2017 10:30 AM Gabriel Bird PTA REHAB CENTER AT [facility]   2/3/2017 10:30 AM Bala Clarke Link Hardin, PT REHAB CENTER AT [facility]   2/6/2017 10:30 AM Peter Wong, PT REHAB CENTER AT [facility]   2/8/2017 2:00 PM Nicole Ordonez, PT REHAB CENTER AT [facility]   2/10/2017 2:00 PM Nicole Ordonez, PT REHAB CENTER AT [facility]

## 2017-01-24 ENCOUNTER — HOSPITAL ENCOUNTER (OUTPATIENT)
Dept: PHYSICAL THERAPY | Age: 77
Discharge: HOME | End: 2017-01-24
Payer: MEDICARE

## 2017-01-24 PROCEDURE — 97140 MANUAL THERAPY 1/> REGIONS: CPT

## 2017-01-24 PROCEDURE — 97110 THERAPEUTIC EXERCISES: CPT

## 2017-01-24 NOTE — PROGRESS NOTES
PHYSICAL THERAPY - DAILY TREATMENT NOTE    Patient Name: Jenny Blizzard        Date: 2017  : 1940    Patient  Verified: YES  Visit #:   15   of   16  Insurance: Payor: Anam Dear / Plan: [place] MEDICARE PART A & B / Product Type: Medicare /      In time: 11:30 Out time: 12:30   Total Treatment Time: 60     Medicare Time Tracking (below)   Total Timed Codes (min):  50 1:1 Treatment Time:  50     TREATMENT AREA/ DIAGNOSIS = Left knee pain [M25.562]    SUBJECTIVE  Pain Level (on 0 to 10 scale):  0  / 10   Medication Changes/New allergies or changes in medical history, any new surgeries or procedures?     NO    If yes, update Summary List   Subjective Functional Status/Changes:  []  No changes reported     Functional improvement less pain   Functional limitation-      OBJECTIVE  Modalities Rationale:     decrease edema, decrease inflammation and decrease pain to improve patient's ability to perform ADLs without pain   min [] Estim, type/location:                                      []  att     []  unatt     []  w/US     []  w/ice    []  w/heat    min []  Mechanical Traction: type/lbs                   []  pro   []  sup   []  int   []  cont    []  before manual    []  after manual    min []  Ultrasound, settings/location:      min []  Iontophoresis w/ dexamethasone, location:                                               []  take home patch       []  in clinic   10 min [x]  Ice     []  Heat    location/position:     min []  Vasopneumatic Device, press/temp:     min []  Other:    [] Skin assessment post-treatment (if applicable):    []  intact    []  redness- no adverse reaction     []redness  adverse reaction:        10 min Manual Therapy: CFM to scar, passive ext, prone passive contract relax knee flexion   Rationale:      decrease pain, increase ROM, increase tissue extensibility and decrease edema  to improve patient's ability to perform ADLs without pain    40 min Therapeutic Exercise:  [x]  See flow sheet   Rationale:      increase ROM, increase strength and improve balance to improve the patients ability to perform ADLs without pain          min Patient Education:  Yes    [x] Reviewed HEP   []  Progressed/Changed HEP based on: Other Objective/Functional Measures: Added squat with focus on correct form  Supine AAROM knee flex to 117 degrees  Full passive knee ext   Post Treatment Pain Level (on 0 to 10) scale:   0  / 10     ASSESSMENT  Assessment/Changes in Function:     Pt with fair quad set, still with ext lag with SLE     []  See Progress Note/Recertification   Patient will continue to benefit from skilled PT services to modify and progress therapeutic interventions, address functional mobility deficits, address ROM deficits, address strength deficits, analyze and address soft tissue restrictions, analyze and cue movement patterns and analyze and modify body mechanics/ergonomics to attain remaining goals.    Progress toward goals / Updated goals:    Less pain and improved PROM (0 to 117 degrees)     PLAN  [x]  Upgrade activities as tolerated YES Continue plan of care   []  Discharge due to :    []  Other:      Therapist: Masha Calderón PT    Date: 1/24/2017 Time: 1:20 PM        Future Appointments  Date Time Provider Jim Pendleton   1/27/2017 10:30 AM Miguel Angel Sandra PTA REH[facility]ENTER AT [facility]   1/30/2017 11:30 AM Eula Purdy PT RE[facility] CENTER AT [facility]   2/1/2017 10:30 AM Miguel Angel Sandra PTA RE[facility]ENTER AT [facility]   2/3/2017 10:30 AM Masha Calderón PT RE[facility]ENTER AT [facility]   2/6/2017 10:30 AM Eula Purdy PT RE[facility] CENTER AT [facility]   2/8/2017 2:00 PM Masha Calderón PT RE[facility] AT [facility]   2/10/2017 2:00 PM Masha Calderón PT REH[facility]ENTER AT [facility]

## 2017-01-30 ENCOUNTER — HOSPITAL ENCOUNTER (OUTPATIENT)
Dept: PHYSICAL THERAPY | Age: 77
Discharge: HOME | End: 2017-01-30
Payer: MEDICARE

## 2017-01-30 PROCEDURE — 97110 THERAPEUTIC EXERCISES: CPT

## 2017-01-30 PROCEDURE — 97140 MANUAL THERAPY 1/> REGIONS: CPT

## 2017-01-30 NOTE — PROGRESS NOTES
PHYSICAL THERAPY - DAILY TREATMENT NOTE    Patient Name: Margit Jeans        Date: 2017  : 1940   yes Patient  Verified  Visit #:   15   of   16  Insurance: Payor: Evy Desir / Plan: [place] MEDICARE PART A & B / Product Type: Medicare /      In time: 76 Out time: 1250   Total Treatment Time: 70 mins     Medicare Time Tracking (below)   Total Timed Codes (min):  55 mins 1:1 Treatment Time:  55 mins     TREATMENT AREA =  Left knee pain [M25.562]    SUBJECTIVE  Pain Level (on 0 to 10 scale):  0  / 10   Medication Changes/New allergies or changes in medical history, any new surgeries or procedures?    no  If yes, update Summary List   Subjective Functional Status/Changes:  []  No changes reported     States she will be leaving for [facility] sometime next week. Does not have access to PT or a gym, will be completing water aerobics. Will be seeing a massage therapist 3-4 times/month. Did not complete any knee exercises over the weekend, as she was busy helping her niece pack up a business. C/o moderate R sided LBP with spasms near the rib cage. OBJECTIVE  Modalities Rationale:     decrease edema, decrease inflammation and decrease pain to improve patient's ability to perform ADLs without pain.    min [] Estim, type/location:                                      []  att     []  unatt     []  w/US     []  w/ice    []  w/heat    min []  Mechanical Traction: type/lbs                   []  pro   []  sup   []  int   []  cont    []  before manual    []  after manual    min []  Ultrasound, settings/location:      min []  Iontophoresis w/ dexamethasone, location:                                               []  take home patch       []  in clinic   10 min [x]  Ice     []  Heat    location/position:     min []  Vasopneumatic Device, press/temp:     min []  Other:    [] Skin assessment post-treatment (if applicable):    [x]  intact    []  redness- no adverse reaction     []redness  adverse reaction:        30 min Therapeutic Exercise:  [x]  See flow sheet   Rationale:      increase ROM, increase strength, improve coordination, improve balance and increase proprioception to improve the patients ability to perform ADLs without pain. 30 min Manual Therapy: R knee articular release, prone contract/relax knee flexion, PROM knee flex/ext in supine. MLD to L inguinals, L upper thigh and knee. Rationale:      decrease pain, increase ROM, increase tissue extensibility and decrease edema  to improve patient's ability to perform ADLs without pain. 10 min Patient Education:  yes  Reviewed HEP   [x]  Progressed/Changed HEP based on: Review of hamstring and gastroc stretches. Added TA/PF activation to SLR, bridging, and supported squats. Other Objective/Functional Measures:  Seated flexion test: Positive for R SI joint dysfunction  Sacral flexion dysfunction: R unilateral flexion  Sacral extension dysfunction: L on R posterior torsion  R SI joint dysfunction: outflare, downslip, and posterior innominate. Pt requires moderate verbal and tactile cues for TA/PF activation - tendency to perform a valsalva or posterior pelvic tilt. Post Treatment Pain Level (on 0 to 10) scale:   0  / 10     ASSESSMENT  Assessment/Changes in Function:     Pt will be leaving for [facility] next week until June. HEP compliance strongly encouraged to focus on regaining ROM WNL and return to full function. Extensive discussion today re: HEP. Poor awareness of core and uses a valsalva maneuver, contributing to spasms through the thoracolumbar junction. []  See Progress Note/Recertification   Patient will continue to benefit from skilled PT services to modify and progress therapeutic interventions, address functional mobility deficits, address ROM deficits, address strength deficits and analyze and address soft tissue restrictions to attain remaining goals.    Progress toward goals / Updated goals:    Pt has no pain today and feels like she is making progress in balance and functional activities.      PLAN  [x]  Upgrade activities as tolerated yes Continue plan of care   []  Discharge due to :    []  Other:      Therapist: Min Ventura PT    Date: 1/30/2017 Time: 11:44 AM     Future Appointments  Date Time Provider Jim Pendleton   2/1/2017 10:30 AM Ricardo Sheppard PTA REHAB CENTER AT [facility]   2/3/2017 10:30 AM Jm Maria PT REHAB CENTER AT [facility]   2/6/2017 10:30 AM Min Ventura PT REHAB [facility]ENTER AT [facility]   2/7/2017 11:30 AM Jm Maria PT REHAB CENTER AT [facility]

## 2017-02-01 ENCOUNTER — HOSPITAL ENCOUNTER (OUTPATIENT)
Dept: PHYSICAL THERAPY | Age: 77
Discharge: HOME | End: 2017-02-01
Payer: MEDICARE

## 2017-02-01 PROCEDURE — 97110 THERAPEUTIC EXERCISES: CPT

## 2017-02-01 PROCEDURE — 97140 MANUAL THERAPY 1/> REGIONS: CPT

## 2017-02-01 NOTE — PROGRESS NOTES
PHYSICAL THERAPY - DAILY TREATMENT NOTE    Patient Name: Ash Purchase        Date: 2017  : 1940    Patient  Verified: YES  Visit #:   9   of      Insurance: Payor: Jordan Jimenez / Plan: [place] & B / Product Type: Medicare /      In time: 3488 Out time: 12   Total Treatment Time: 75     Medicare Time Tracking (below)   Total Timed Codes (min):  65 1:1 Treatment Time:  55     TREATMENT AREA/ DIAGNOSIS = Left knee pain [M25.562]    SUBJECTIVE  Pain Level (on 0 to 10 scale):  7  / 10   Medication Changes/New allergies or changes in medical history, any new surgeries or procedures?     NO    If yes, update Summary List   Subjective Functional Status/Changes:  []  No changes reported     Functional improvement:  Walking with it straighter   Functional limitation:  Sore from just staying in the house the last few days     OBJECTIVE  Modalities Rationale:   x decrease edema/ inflammation  x decrease pain  x  increase tissue extensibility    x increase muscle performance    decrease neural compromise  to improve patient's ability to   x perform ADLs   x  ambulate  x perform work    relaxation/ sleep      min [] Estim, type/location:                                      []  att     []  unatt     []  w/US     []  w/ice    []  w/heat    min []  Mechanical Traction: type/lbs                   []  pro   []  sup   []  int   []  cont    []  before manual    []  after manual    min []  Ultrasound, settings/location:      min []  Iontophoresis w/ dexamethasone, location:                                               []  take home patch       []  in clinic   10 min x Ice        Heat    location/position:     min []  Vasopneumatic Device, press/temp:     min []  Other:    [] Skin assessment post-treatment (if applicable):    []  intact    []  redness- no adverse reaction     []redness  adverse reaction:        35 min Therapeutic Exercise:  [x]  See flow sheet   Rationale:   x increase ROM   x increase strength   x increase endurance     x increase motor control    Other  to improve patients ability to x perform ADLs   x ambulate   x perform work    relaxation/ sleep     15 min Manual Therapy: Technique:        x STM    ASTM    TPR  x PROM    x Stretching     Jt manipulation  x Gr I.   x II.    x  III. IV.     V.  Treatment Area: Passive Range of Motion for flexion and extension. Extension and Patellar mobilization. Other:   Rationale:   x decrease pain   x decrease TP   x increase ROM/ mobility   x increase tissue extensibility       decrease edema     reduce disc   postural correction   other     to improve patient's ability to  x perform ADLs    x ambulate   x perform work     relaxation/ sleep        min Therapeutic Activity:  [x] see flow sheet   Rationale:  x increase ROM    x increase strength    x increase balance/ proprioception    x increase motor control    other   to improve patients ability to   x perform ADLs   x ambulate   x perform work                relaxation/ sleep      min Neuromuscular Re-ed:  [x] see flow sheet   Rationale:   x increase ROM    x increase strength    x increase balance/ proprioception  x increase motor control    x improve safety   other    to improve patients ability to   x perform ADLs    x ambulate   x perform work    relaxation/ sleep                min Gait Training: To improve patients ability to:    [] perform ADLs   [] ambulate       min Patient Education:  Yes    [x] Reviewed HEP   []  Progressed/Changed HEP based on:         Other Objective/Functional Measures:    Gave new HEP printout   Patient education for postural awareness, body mechanics, and lifting techniques        Post Treatment Pain Level (on 0 to 10) scale:   0  / 10     ASSESSMENT  []  See Progress Note/Recertification      Patient will continue to benefit from skilled PT services to modify and progress therapeutic interventions, address functional mobility deficits, address ROM deficits and address strength deficits to attain remaining goals. Pt will be independent and compliant with HEP.   States compliance and ind   Progress toward goals / Updated goals:    [x] decr pain   [x]inc stability   []inc balance [x] inc ROM[x] inc strength  [] centralizing symptoms                    [x] progressing  Function  [x] progressing towards LTGs            [x]  progressing HEP       PLAN  [x]  Upgrade activities as tolerated YES Continue plan of care   []  Discharge due to :    []  Other:       Therapist: Krupa Rodney PTA    Date: 2/1/2017 Time: 5:45 PM

## 2017-02-03 ENCOUNTER — HOSPITAL ENCOUNTER (OUTPATIENT)
Dept: PHYSICAL THERAPY | Age: 77
Discharge: HOME | End: 2017-02-03
Payer: MEDICARE

## 2017-02-03 PROCEDURE — 97140 MANUAL THERAPY 1/> REGIONS: CPT

## 2017-02-03 PROCEDURE — 97110 THERAPEUTIC EXERCISES: CPT

## 2017-02-03 NOTE — PROGRESS NOTES
PHYSICAL THERAPY - DAILY TREATMENT NOTE    Patient Name: Lionel Harris        Date: 2/3/2017  : 1940    Patient  Verified: YES  Visit #:   10   of   12  Insurance: Payor: Ivanna Christianson / Plan: [place] MEDICARE PART A & B / Product Type: Medicare /      In time: 10:40 Out time: 11:30   Total Treatment Time: 50     Medicare Time Tracking (below)   Total Timed Codes (min):  40 1:1 Treatment Time:  25     TREATMENT AREA/ DIAGNOSIS = Left knee pain [M25.562]    SUBJECTIVE  Pain Level (on 0 to 10 scale):  0  / 10   Medication Changes/New allergies or changes in medical history, any new surgeries or procedures?     NO    If yes, update Summary List   Subjective Functional Status/Changes:  []  No changes reported     Functional improvement no pain right now   Functional limitation-      OBJECTIVE  Modalities Rationale:     decrease edema, decrease inflammation and decrease pain to improve patient's ability to perform ADLs without pain   min [] Estim, type/location:                                      []  att     []  unatt     []  w/US     []  w/ice    []  w/heat    min []  Mechanical Traction: type/lbs                   []  pro   []  sup   []  int   []  cont    []  before manual    []  after manual    min []  Ultrasound, settings/location:      min []  Iontophoresis w/ dexamethasone, location:                                               []  take home patch       []  in clinic   10 min [x]  Ice     []  Heat    location/position:     min []  Vasopneumatic Device, press/temp:     min []  Other:    [x] Skin assessment post-treatment (if applicable):    [x]  intact    [x]  redness- no adverse reaction     []redness  adverse reaction:        10 min Manual Therapy: DTM to L quad, patellar mobs and passive extension, prone knee flexion   Rationale:      decrease pain, increase ROM, increase tissue extensibility and decrease edema  to improve patient's ability to perform ADLs without pain    30 min Therapeutic Exercise: [x]  See flow sheet   Rationale:      increase ROM and increase strength to improve the patients ability to perform ADLs without pain          min Patient Education:  Yes    [x] Reviewed HEP   []  Progressed/Changed HEP based on: Other Objective/Functional Measures:    No pain today  Added prone hip ext and prone quad stretch, and heel slide with belt  Pt still lacking full knee extension, able to get full passive extension  Still with ext lag with SLR  Supine AAROM flex to 120 degrees  Pt given extensive written HEP to practice over the weekend   Post Treatment Pain Level (on 0 to 10) scale:   0  / 10     ASSESSMENT  Assessment/Changes in Function:     Pt still with ext lag and lack of full TKE     []  See Progress Note/Recertification   Patient will continue to benefit from skilled PT services to modify and progress therapeutic interventions, address functional mobility deficits, address ROM deficits, address strength deficits and analyze and address soft tissue restrictions to attain remaining goals.    Progress toward goals / Updated goals:    AAROM flex to 120 degrees     PLAN  [x]  Upgrade activities as tolerated YES Continue plan of care   []  Discharge due to :    []  Other:      Therapist: Adriel Taylor PT    Date: 2/3/2017 Time: 11:47 AM        Future Appointments  Date Time Provider Jim Pendleton   2/6/2017 10:30 AM Alton Funes PT [facility] AT [facility]   2/7/2017 11:30 AM Adriel Taylor PT REHAB [facility] AT [facility]

## 2017-02-06 ENCOUNTER — HOSPITAL ENCOUNTER (OUTPATIENT)
Dept: PHYSICAL THERAPY | Age: 77
Discharge: HOME | End: 2017-02-06
Payer: MEDICARE

## 2017-02-06 PROCEDURE — 97140 MANUAL THERAPY 1/> REGIONS: CPT

## 2017-02-06 PROCEDURE — 97110 THERAPEUTIC EXERCISES: CPT

## 2017-02-06 NOTE — PROGRESS NOTES
PHYSICAL THERAPY - DAILY TREATMENT NOTE    Patient Name: Lianne Szymanski        Date: 2017  : 1940   yes Patient  Verified  Visit #:     Insurance: Payor: Yulissa Estrada / Plan: [place] MEDICARE PART A & B / Product Type: Medicare /      In time: 1124 Out time: 1145   Total Treatment Time: 75 mins     Medicare Time Tracking (below)   Total Timed Codes (min):  65 mins 1:1 Treatment Time:  30 mins     TREATMENT AREA =  Left knee pain [M25.562]    SUBJECTIVE  Pain Level (on 0 to 10 scale):  0 / 10   Medication Changes/New allergies or changes in medical history, any new surgeries or procedures?    no  If yes, update Summary List   Subjective Functional Status/Changes:  []  No changes reported     States yesterday her pain was about a 4/10 in her R hip. Was unable to climb the stairs due to pain and felt like her leg was going to give out. Had no difficulty today. Will be leaving for [facility] on Wednesday, has been doing a lot of packing. Home exercise compliance has been limited. 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 OBJECTIVE  Modalities Rationale:     decrease edema, decrease inflammation and decrease pain to improve patient's ability to perform ADLs without pain.    min [] Estim, type/location:                                      []  att     []  unatt     []  w/US     []  w/ice    []  w/heat    min []  Mechanical Traction: type/lbs                   []  pro   []  sup   []  int   []  cont    []  before manual    []  after manual    min []  Ultrasound, settings/location:      min []  Iontophoresis w/ dexamethasone, location:                                               []  take home patch       []  in clinic   10 min [x]  Ice     []  Heat    location/position:     min []  Vasopneumatic Device, press/temp:     min []  Other:    [] Skin assessment post-treatment (if applicable):    [x]  intact    []  redness- no adverse reaction     []redness  adverse reaction:        35 min Therapeutic Exercise:  [x]  See flow sheet   Rationale:      increase ROM, increase strength, improve coordination, improve balance and increase proprioception to improve the patients ability to perform ADLs painfree. 27 Min Manual Therapy: R knee articular release, prone contract/relax knee flexion, PROM knee flex/ext in supine. S/CS to R ilium positional faults. MET for sacral dysfunction. Rationale:      decrease pain, increase ROM, increase tissue extensibility and decrease edema  to improve patient's ability to perform ADLs painfree. 3 min Patient Education:  yes  Reviewed HEP   []  Progressed/Changed HEP based on: Other Objective/Functional Measures:    Seated flexion test: Positive for R SI joint dysfunction  Sacral flexion dysfunction: R unilateral flexion  Sacral extension dysfunction: L on R posterior torsion. R SI joint dysfunction: outflare, downslip, and posterior innominate. Mod hypomobility of scar. Moderate decrease of flexibility through L hamstring, gastroc, and IT band. Post Treatment Pain Level (on 0 to 10) scale:   0  / 10     ASSESSMENT  Assessment/Changes in Function:     Pt will be d/c after tomorrow's session as she will be leaving for [facility] until June. Pt compliance with HEP has been limited, significant discussion today on ideas to improve HEP compliance. Pt will not have access to PT in [facility], only a massage therapist.      []  See Progress Note/Recertification   Patient will continue to benefit from skilled PT services to modify and progress therapeutic interventions, address ROM deficits, address strength deficits, analyze and address soft tissue restrictions and instruct in home and community integration to attain remaining goals. Progress toward goals / Updated goals:    Pt demonstrate minimal limitation of L knee flexion and extension ROM. Painfree today.      PLAN  [x]  Upgrade activities as tolerated yes Continue plan of care   []  Discharge due to :    [] Other:      Therapist: Jose Chaudhari PT    Date: 2/6/2017 Time: 10:33 AM     Future Appointments  Date Time Provider Jim Pendleton   2/7/2017 11:30 AM Leonel Ma PT REH[facility] AT [facility]

## 2017-02-07 ENCOUNTER — HOSPITAL ENCOUNTER (OUTPATIENT)
Dept: PHYSICAL THERAPY | Age: 77
Discharge: HOME | End: 2017-02-07
Payer: MEDICARE

## 2017-02-07 PROCEDURE — 97110 THERAPEUTIC EXERCISES: CPT

## 2017-02-07 PROCEDURE — G8979 MOBILITY GOAL STATUS: HCPCS

## 2017-02-07 PROCEDURE — G8980 MOBILITY D/C STATUS: HCPCS

## 2017-02-07 PROCEDURE — 97140 MANUAL THERAPY 1/> REGIONS: CPT

## 2017-02-07 NOTE — PROGRESS NOTES
[facility] PHYSICAL THERAPY AT [address], [address] [place] [address], [address], [address] [place] [address]  Phone: [phone]  Fax: [phone]  DISCHARGE FOR PHYSICAL THERAPY          Patient Name: Chayito Graham : 1940   Treatment/Medical Diagnosis: Left knee pain [M25.562]   Onset Date: 16    Referral Source: Singh Mcfarlane MD Start of Care [facility]): 16   Prior Hospitalization: See Medical History Provider #: [id number]   Prior Level of Function: Independent with ADLs   Comorbidities: OA, UI, sciatica, Sleep dysfunction   Medications: Verified on Patient Summary List   Visits from [facility]: 19 Missed Visits: 0     Goal/Measure of Progress Goal Met? 1.  L knee AROM to > 1 to 115 degrees   Status at last Eval: -4 to 105 degrees Current Status: 0 to 109 degrees progressing   2. Pt with FOTO score of > 54   Status at last Eval: 47 Current Status: 79 Yes   3. Increase strength to allow recipricol gait on the stairs    Status at last Eval: Step to gait on stairs  Current Status: recipricol gait on stairs Yes     Key Functional Changes/Progress: improved gait and decreased pain levels. Passive knee flexion to 120 degrees. independent with HEP. Pt DCed with move to [facility] for the next 3 months. G-Codes (GP): Mobility:  [id number] Goal  CJ= 20-39%  D/C  CJ= 20-39%. The severity rating is based on the FOTO Score       Assessments/Recommendations: DC Pt from PT, with next 3 months in [facility].   If you have any questions/comments please contact us directly at [phone]. Thank you for allowing us to assist in the care of your patient. Therapist Signature:  Wisam Mclaughlin PT, MDT Date:    Certification Period:  Reporting Period: 16-3/23/17  1/20/17-17 Time: 4:44 PM

## 2017-02-07 NOTE — PROGRESS NOTES
PHYSICAL THERAPY - DAILY TREATMENT NOTE    Patient Name: Lianne Szymanski        Date: 2017  : 1940    Patient  Verified: YES  Visit #:     Insurance: Payor: Yulissa Estrada / Plan: [place] MEDICARE PART A & B / Product Type: Medicare /      In time: 11:35 Out time: 12:25   Total Treatment Time: 50     Medicare Time Tracking (below)   Total Timed Codes (min):  50 1:1 Treatment Time:  25     TREATMENT AREA/ DIAGNOSIS = Left knee pain [M25.562]    SUBJECTIVE  Pain Level (on 0 to 10 scale):  0  / 10   Medication Changes/New allergies or changes in medical history, any new surgeries or procedures?     NO    If yes, update Summary List   Subjective Functional Status/Changes:  []  No changes reported     Functional improvement no pain right now   Functional limitation-      OBJECTIVE  Modalities Rationale:     decrease edema, decrease inflammation and decrease pain to improve patient's ability to perform ADLs without pain   min [] Estim, type/location:                                      []  att     []  unatt     []  w/US     []  w/ice    []  w/heat    min []  Mechanical Traction: type/lbs                   []  pro   []  sup   []  int   []  cont    []  before manual    []  after manual    min []  Ultrasound, settings/location:      min []  Iontophoresis w/ dexamethasone, location:                                               []  take home patch       []  in clinic   10 min [x]  Ice     []  Heat    location/position:     min []  Vasopneumatic Device, press/temp:     min []  Other:    [x] Skin assessment post-treatment (if applicable):    [x]  intact    []  redness- no adverse reaction     []redness  adverse reaction:        10 min Manual Therapy: DTM to L quad, patellar mobs and passive extension, prone contract/relax quad stretch and knee flexion   Rationale:      decrease pain, increase ROM and increase tissue extensibility to improve patient's ability to perform ADLs without pain    30 min Therapeutic Exercise:  [x]  See flow sheet   Rationale:      increase ROM and increase strength to improve the patients ability to perform ADLs without pain          min Patient Education:  Yes    [x] Reviewed HEP   []  Progressed/Changed HEP based on: Other Objective/Functional Measures:    See DC note AROM 0 to 109, PROM 0 to 120   Post Treatment Pain Level (on 0 to 10) scale:   0  / 10     ASSESSMENT  Assessment/Changes in Function:     See DC     [x]  See Progress Note/Recertification   Patient will continue to benefit from skilled PT services to instruct in home and community integration to attain remaining goals. Progress toward goals / Updated goals:    See DC     PLAN  []  Upgrade activities as tolerated YES Continue plan of care   [x]  Discharge due to : Pt spending the next 3 [place] [facility]   []  Other:      Therapist: Lauren Cyr, PT    Date: 2/7/2017 Time: 1:11 PM        No future appointments.

## 2017-02-08 ENCOUNTER — APPOINTMENT (OUTPATIENT)
Dept: PHYSICAL THERAPY | Age: 77
End: 2017-02-08
Payer: MEDICARE

## 2017-02-10 ENCOUNTER — APPOINTMENT (OUTPATIENT)
Dept: PHYSICAL THERAPY | Age: 77
End: 2017-02-10
Payer: MEDICARE

## 2018-03-01 ENCOUNTER — HOSPITAL ENCOUNTER (OUTPATIENT)
Dept: PHYSICAL THERAPY | Age: 78
Discharge: HOME | End: 2018-03-01
Payer: MEDICARE

## 2018-03-01 PROCEDURE — G8979 MOBILITY GOAL STATUS: HCPCS

## 2018-03-01 PROCEDURE — G8978 MOBILITY CURRENT STATUS: HCPCS

## 2018-03-01 PROCEDURE — 97161 PT EVAL LOW COMPLEX 20 MIN: CPT

## 2018-03-01 NOTE — PROGRESS NOTES
[facility]  PHYSICAL THERAPY AT [facility]. [place], [address] - Phone: [phone]  Fax: [phone] / [address] [facility]  Patient Name: Elizabeth Garrido : 1940   Medical   Diagnosis: Right knee pain [M25.561] Treatment Diagnosis: L TKR, R knee PFOA   Onset Date: Chronic     Referral Source: Ramos Hankins MD Start of Care [facility]): 3/1/2018   Prior Hospitalization: See medical history Provider #: [id number]   Prior Level of Function: L TKR on 16   Comorbidities: OA   Medications: Verified on Patient Summary List   The Plan of Care and following information is based on the information from the initial evaluation.   ==============================================================================  Assessment / key information:   Elizabeth Garrido is a 78 y.o. female with a chief c/o B knee pain, rainging from 0/10 to 8/10. The patient had a L TKR on 16, which has started to cause her pain again. She also has R knee PFOA, with significant PF crepitus. She needs 2 handrails to negotiate stairs with recipricol gait. With one handrail or pain, she needs to use step to gait. Her AROM/PROM ext/flex is as follows: R knee = -12/-6 thru 115/125 degrees, L knee -11/-5 thru 112/116 degrees. She has normal MMT to LE, though she c/o of R quad \"giving way\" when she stands after sitting. Walking tolerance is limited to 15 minutes. SLS is poor, with < 2 sec L and R.  The patient would benefit from skilled physical therapy at this time.  ===========================================================================================  Eval Complexity: History MEDIUM  Complexity : 1-2 comorbidities / personal factors will impact the outcome/ POC ;  Examination  MEDIUM Complexity : 3 Standardized tests and measures addressing body structure, function, activity limitation and / or participation in recreation ; Presentation LOW Complexity : Stable, uncomplicated ;  Decision Making MEDIUM Complexity : FOTO score of 26-74; Overall Complexity LOW   Problem List: pain affecting function, decrease ROM, decrease strength, edema affecting function, impaired gait/ balance, decrease ADL/ functional abilitiies, decrease activity tolerance and decrease flexibility/ joint mobility   Treatment Plan may include any combination of the following: Therapeutic exercise, Therapeutic activities, Neuromuscular re-education, Physical agent/modality, Gait/balance training, Manual therapy, Patient education and Home safety training  Patient / Family readiness to learn indicated by: asking questions, trying to perform skills and interest  Persons(s) to be included in education: patient (P)  Barriers to Learning/Limitations: None  Measures taken:    Patient Goal (s): \"get stronger\"   Patient self reported health status: excellent  Rehabilitation Potential: good   Short Term Goals: To be accomplished in  2  weeks:  1. Decrease max knee pain to < 5/10   Long Term Goals: To be accomplished in  4-5  weeks:  1. Full active B knee extension L and R.    2.  Increase strength to allow recipricol gait on stairs, with one handrail or less  3. Increase B SLS to > 10 sec, to indicate increased balance. 4.  Patient Independent with HEP/selfcare   Frequency / Duration:   Patient to be seen 2-3  times per week for 4-8  weeks:  Patient / Caregiver education and instruction: self care, activity modification and exercises  G-Codes (GP): Mobility [id number] Current  CM= 80-99%   Goal  CI= 1-19%. The severity rating is based on the Other subjective pain scale  Therapist Signature:  Kavya Peraza PT, MDT Date: 9/9/5896   Certification Period: 3/1/18-5/28/18 Time: 4:34 PM   ===========================================================================================  I certify that the above Physical Therapy Services are being furnished while the patient is under my care. I agree with the treatment plan and certify that this therapy is necessary. Physician Signature:        Date:       Time:     Please sign and return to [facility] or you may fax the signed copy to [phone]. Thank you.

## 2018-03-01 NOTE — PROGRESS NOTES
PHYSICAL THERAPY - DAILY TREATMENT NOTE    Patient Name: Andrew Dash        Date: 3/1/2018  : 1940   YES Patient  Verified  Visit #:     Insurance: Payor: Glenna Sosa / Plan: [place] MEDICARE PART A & B / Product Type: Medicare /      In time: 4:15 Out time: 4:40   Total Treatment Time: 35     Medicare Time Tracking (below)   Total Timed Codes (min): 0 1:1 Treatment Time:  35     TREATMENT AREA =  B knees    SUBJECTIVE    Pain Level (on 0 to 10 scale):  5  / 10   Medication Changes/New allergies or changes in medical history, any new surgeries or procedures? NO    If yes, update Summary List   Subjective Functional Status/Changes:  []  No changes reported     See eval          OBJECTIVE    Modality rationale:  -    min [] Estim, type:                                          []  att     []  unatt     []  w/US     []  w/ice    []  w/heat    min []  Mechanical Traction: type/lbs                                               []  pro   []  sup   []  int   []  cont    min []  Ultrasound, settings/location:      min []  Iontophoresis:  []  take home patch w/ dexamethazone    min                                []  in clinic w/ dexamethazone    min []  Ice     []  Heat     position:     min []  Other:      - min Therapeutic Exercise:  [x]  See flow sheet   []  Other:      []  Added:     to improve (function):    []  Changed:     to improve (function):       min Therapeutic Activity:      - min Manual Therapy:       min Gait Training:       min Patient Education:  YES  Reviewed HEP   []  Progressed/Changed HEP based on:         Other Objective/Functional Measures:    See eval     Post Treatment Pain Level (on 0 to 10) scale:    10     ASSESSMENT    []  See Progress Note/Recertification   Patient will continue to benefit from skilled therapy to address remaining functional deficits: see eval   Progress toward goals / Updated goals:    -     PLAN    [x]  Upgrade activities as tolerated YES Continue plan of care   []  Discharge due to :    []  Other:      Therapist: Ju Gallegos PT    Date: 3/1/2018 Time: 4:07 PM

## 2018-03-07 ENCOUNTER — APPOINTMENT (OUTPATIENT)
Dept: PHYSICAL THERAPY | Age: 78
End: 2018-03-07
Payer: MEDICARE

## 2018-03-09 ENCOUNTER — APPOINTMENT (OUTPATIENT)
Dept: PHYSICAL THERAPY | Age: 78
End: 2018-03-09
Payer: MEDICARE

## 2018-03-14 ENCOUNTER — APPOINTMENT (OUTPATIENT)
Dept: PHYSICAL THERAPY | Age: 78
End: 2018-03-14
Payer: MEDICARE

## 2018-03-16 ENCOUNTER — APPOINTMENT (OUTPATIENT)
Dept: PHYSICAL THERAPY | Age: 78
End: 2018-03-16
Payer: MEDICARE

## 2018-04-12 NOTE — PROGRESS NOTES
[address] PHYSICAL THERAPY AT [facility] [address]. [place], [address]  Phone: [phone]  Fax: [phone]  DISCHARGE SUMMARY FOR PHYSICAL THERAPY          Patient Name: Maeola Nyhan : 1940   Treatment/Medical Diagnosis: Right knee pain [M25.561]   Onset Date: Chronic    Referral Source: Ashleigh Corcoran MD [facility]): 3/1/18   Prior Hospitalization: See Medical History Provider #: [id number]   Prior Level of Function: Chronic L knee pain   Comorbidities: OA, L TKR   Medications: Verified on Patient Summary List   Visits from [facility]: 1 Missed Visits: 3       Key Functional Changes/Progress: the patient did not return after her initial evaluation and has been discharged. Assessments/Recommendations: Discontinue therapy due to lack of attendance or compliance. If you have any questions/comments please contact us directly at [phone]. Thank you for allowing us to assist in the care of your patient. Therapist Signature:  Tina Orozco PT Date: 18   Reporting Period: 3/1/18-3/1/18 Time: 11:28 AM

## 2018-05-16 ENCOUNTER — HOSPITAL ENCOUNTER (OUTPATIENT)
Dept: PHYSICAL THERAPY | Age: 78
Discharge: HOME | End: 2018-05-16
Payer: MEDICARE

## 2018-05-16 PROCEDURE — G8978 MOBILITY CURRENT STATUS: HCPCS

## 2018-05-16 PROCEDURE — G8979 MOBILITY GOAL STATUS: HCPCS

## 2018-05-16 PROCEDURE — 97161 PT EVAL LOW COMPLEX 20 MIN: CPT

## 2018-05-16 PROCEDURE — 97140 MANUAL THERAPY 1/> REGIONS: CPT

## 2018-05-16 NOTE — PROGRESS NOTES
PHYSICAL THERAPY - DAILY TREATMENT NOTE    Patient Name: Darlyn Sweeney        Date: 2018  : 1940   YES Patient  Verified  Visit #:     Insurance: Payor: Lydia Lyory / Plan: [place] MED[place] & B / Product Type: Medicare /      In time: 12:10 Out time: 12:50   Total Treatment Time: 40     Medicare Time Tracking (below)   Total Timed Codes (min):  10 1:1 Treatment Time:  40     TREATMENT AREA =  R knee    SUBJECTIVE    Pain Level (on 0 to 10 scale):   10   Medication Changes/New allergies or changes in medical history, any new surgeries or procedures? NO    If yes, update Summary List   Subjective Functional Status/Changes:  []  No changes reported      see eval          OBJECTIVE    Modality rationale:  PD    min [] Estim, type:                                          []  att     []  unatt     []  w/US     []  w/ice    []  w/heat    min []  Mechanical Traction: type/lbs                                               []  pro   []  sup   []  int   []  cont    min []  Ultrasound, settings/location:      min []  Iontophoresis:  []  take home patch w/ dexamethazone    min                                []  in clinic w/ dexamethazone    min []  Ice     []  Heat     position:     min []  Other:        min Therapeutic Exercise:  [x]  See flow sheet   []  Other:      []  Added:     to improve (function):    []  Changed:     to improve (function):       min Therapeutic Activity:      10 min Manual Therapy: Patellar mobs, passive ext B knees      min Gait Training:       min Patient Education:  YES  Reviewed HEP   []  Progressed/Changed HEP based on:         Other Objective/Functional Measures:    See eval     Post Treatment Pain Level (on 0 to 10) scale:    10     ASSESSMENT    []  See Progress Note/Recertification   Patient will continue to benefit from skilled therapy to address remaining functional deficits: see eval   Progress toward goals / Updated goals:    -     PLAN    [x] Upgrade activities as tolerated YES Continue plan of care   []  Discharge due to :    []  Other:      Therapist: Nat Lopez PT    Date: 5/16/2018 Time: 3:25 PM

## 2018-05-16 NOTE — PROGRESS NOTES
[address]   PHYSICAL THERAPY AT [facility] [address]. [place], [address] - Phone: [phone]  Fax: [phone] / [address] [facility]  Patient Name: Pranav Proctor : 1940   Medical   Diagnosis: Unilateral primary osteoarthritis, right knee [M17.11]  Presence of left artificial knee joint [Z96.652] Treatment Diagnosis: R knee OA, L TKR   Onset Date: Chronic     Referral Source: [facility], Not On File, MD [facility]): 2018   Prior Hospitalization: See medical history Provider #: [id number]   Prior Level of Function: Chronic R knee OA, L TKR (Dec, 2016)   Comorbidities: none   Medications: Verified on Patient Summary List   The Plan of Care and following information is based on the information from the initial evaluation.   ==============================================================================  Assessment / key information:   Pranav Proctor is a 78 y.o. female with a chief c/o intermittent B knee pain, up to 8/10. The patient reports B knee pain, chronic in her R knee, and since L TKR in 2016. She reports her R knee \"seamus\" and has caused her to fall. General B quad HS strength is 4+/5, but R knee tends to \"give way\" when sore and swollen. AROM/PROM; R knee -12/-8 degrees of ext to 125/128 degrees of flex, L knee -3/-1 degrees of ext to 118/120 degrees of flex. She has poor functional squat, with limited ROM and poor form. Her L hip ER AROM/PROM is also limited, with 22/28 degrees of ER. B HS length is limited, with < 40 degree SLR. SLS < 2 sec L and R. Pt was instructed on stair safety.   The patient would benefit from skilled physical therapy at this time.  ===========================================================================================  Eval Complexity: History LOW Complexity : Zero comorbidities / personal factors that will impact the outcome / POC; Examination  MEDIUM Complexity : 3 Standardized tests and measures addressing body structure, function, activity limitation and / or participation in recreation ; Presentation MEDIUM Complexity : Evolving with changing characteristics ; Decision Making MEDIUM Complexity : FOTO score of 26-74; Overall Complexity LOW   Problem List: pain affecting function, decrease ROM, decrease strength, edema affecting function, impaired gait/ balance, decrease ADL/ functional abilitiies, decrease activity tolerance and decrease flexibility/ joint mobility   Treatment Plan may include any combination of the following: Therapeutic exercise, Therapeutic activities, Neuromuscular re-education, Physical agent/modality, Gait/balance training, Manual therapy, Patient education, Functional mobility training, Home safety training and Stair training  Patient / Family readiness to learn indicated by: asking questions, trying to perform skills and interest  Persons(s) to be included in education: patient (P)  Barriers to Learning/Limitations: None  Measures taken:    Patient Goal (s): \"get stronger\"   Patient self reported health status: good  Rehabilitation Potential: good   Short Term Goals: To be accomplished in  2  weeks:  1. Decrease max pain to < 4/10 in B knees  2. Full active ext L knee   Long Term Goals: To be accomplished in  4-5  weeks:  1. Increase balance, as indicated by SLS of > 10 sec L and R  2. Increase strength to allow recipricol gait on stairs, with one handrail  3. Full R knee extension, actively  4. Patient Independent with HEP/selfcare    Frequency / Duration:   Patient to be seen 2-3  times per week for 4-8  weeks:  Patient / Caregiver education and instruction: self care, activity modification and exercises  G-Codes (GP): Mobility: [id number] Current  CM= 80-99%   Goal  CI= 1-19%. The severity rating is based on the Other subjective pain scale  Therapist Signature:  Josie Joseph, PT Date: 5/16/2018 Certification Period: 5/16/18-8/11/18 Time: 3:27 PM   ===========================================================================================  I certify that the above Physical Therapy Services are being furnished while the patient is under my care. I agree with the treatment plan and certify that this therapy is necessary. Physician Signature:        Date:       Time:     Please sign and return to [facility] or you may fax the signed copy to [phone]. Thank you.

## 2018-05-22 ENCOUNTER — APPOINTMENT (OUTPATIENT)
Dept: PHYSICAL THERAPY | Age: 78
End: 2018-05-22
Payer: MEDICARE

## 2018-05-23 ENCOUNTER — HOSPITAL ENCOUNTER (OUTPATIENT)
Dept: PHYSICAL THERAPY | Age: 78
Discharge: HOME | End: 2018-05-23
Payer: MEDICARE

## 2018-05-23 PROCEDURE — 97110 THERAPEUTIC EXERCISES: CPT

## 2018-05-23 NOTE — PROGRESS NOTES
PHYSICAL THERAPY - DAILY TREATMENT NOTE    Patient Name: Maikel Pineda        Date: 2018  : 1940    Patient  Verified: YES  Visit #:      of     Insurance: Payor: Yolnada James / Plan: [place] MEDICARE PART A & B / Product Type: Medicare /      In time: 11:25 Out time: 12:05   Total Treatment Time: 40     Medicare Time Tracking (below)   Total Timed Codes (min):  30 1:1 Treatment Time:  25     TREATMENT AREA/ DIAGNOSIS = Unilateral primary osteoarthritis, right knee [M17.11]  Presence of left artificial knee joint [Z96.652]    SUBJECTIVE  Pain Level (on 0 to 10 scale):  3  / 10   Medication Changes/New allergies or changes in medical history, any new surgeries or procedures?     NO    If yes, update Summary List   Subjective Functional Status/Changes:  [x]  No changes reported     Functional improvement    Functional limitation       OBJECTIVE  Modalities Rationale:     decrease edema, decrease inflammation and decrease pain to improve patient's ability to perform ADLs without pain   min [] Estim, type/location:                                      []  att     []  unatt     []  w/US     []  w/ice    []  w/heat    min []  Mechanical Traction: type/lbs                   []  pro   []  sup   []  int   []  cont    []  before manual    []  after manual    min []  Ultrasound, settings/location:      min []  Iontophoresis w/ dexamethasone, location:                                               []  take home patch       []  in clinic   10 min [x]  Ice     []  Heat    location/position: B knees    min []  Vasopneumatic Device, press/temp:     min []  Other:    [x] Skin assessment post-treatment (if applicable):    [x]  intact    [x]  redness- no adverse reaction     []redness  adverse reaction:        5 min Manual Therapy: B patellar mobs and passive ext   Rationale:      decrease pain, increase ROM and increase tissue extensibility to improve patient's ability to perform ADLs without pain    25 min Therapeutic Exercise:  [x]  See flow sheet   Rationale:      increase ROM and increase strength to improve the patients ability to perform ADLs without pain          min Patient Education:  Yes    [x] Reviewed HEP   []  Progressed/Changed HEP based on: Other Objective/Functional Measures:    Pt with full passive ext L and R  Pt 30 min late for PT, did limited workout   Post Treatment Pain Level (on 0 to 10) scale:   0  / 10     ASSESSMENT  Assessment/Changes in Function:     Pt tolerated therex well     []  See Progress Note/Recertification   Patient will continue to benefit from skilled PT services to modify and progress therapeutic interventions, address functional mobility deficits, address ROM deficits, address strength deficits, analyze and address soft tissue restrictions, analyze and cue movement patterns, analyze and modify body mechanics/ergonomics and assess and modify postural abnormalities to attain remaining goals.    Progress toward goals / Updated goals:    Improved B knee ext ROM     PLAN  [x]  Upgrade activities as tolerated YES Continue plan of care   []  Discharge due to :    []  Other:      Therapist: Lionel Pollack PT    Date: 5/23/2018 Time: 1:35 PM        Future Appointments  Date Time Provider Jim Pendleton   5/25/2018 2:30 PM Lionel Pollack PT REHAB CENTER AT [facility]   5/29/2018 11:30 AM Lionel Pollack, PT REHAB CENTER AT [facility]   6/7/2018 11:00 AM Lionel Pollack, PT REHAB CENTER AT [facility]   6/12/2018 11:00 AM Lionel Pollack, PT RE[facility] AT [facility]   6/15/2018 11:00 AM Lionel Pollack PT RE[facility] AT [facility]   6/19/2018 11:00 AM Lionel Pollack, PT REHAB CENTER AT [facility]   6/22/2018 11:00 AM Lionel Pollack PT REHAB CENTER AT [facility]   6/26/2018 11:00 AM Lionel Pollack, PT REHAB CENTER AT [facility]   6/29/2018 11:00 AM Lionel Pollack, PT REHAB CENTER AT [facility]

## 2018-05-25 ENCOUNTER — HOSPITAL ENCOUNTER (OUTPATIENT)
Dept: PHYSICAL THERAPY | Age: 78
Discharge: HOME | End: 2018-05-25
Payer: MEDICARE

## 2018-05-25 PROCEDURE — 97110 THERAPEUTIC EXERCISES: CPT

## 2018-05-29 ENCOUNTER — HOSPITAL ENCOUNTER (OUTPATIENT)
Dept: PHYSICAL THERAPY | Age: 78
Discharge: HOME | End: 2018-05-29
Payer: MEDICARE

## 2018-05-29 PROCEDURE — 97140 MANUAL THERAPY 1/> REGIONS: CPT

## 2018-05-29 PROCEDURE — 97110 THERAPEUTIC EXERCISES: CPT

## 2018-05-29 NOTE — PROGRESS NOTES
PHYSICAL THERAPY - DAILY TREATMENT NOTE    Patient Name: Jemal Bolton        Date: 2018  : 1940    Patient  Verified: YES  Visit #:      of   12  Insurance: Payor: Marta Whitten / Plan: [place] MEDICARE PART A & B / Product Type: Medicare /      In time: 11:25 Out time: 12:10   Total Treatment Time: 39     Medicare Time Tracking (below)   Total Timed Codes (min):  35 1:1 Treatment Time:  25     TREATMENT AREA/ DIAGNOSIS = Unilateral primary osteoarthritis, right knee [M17.11]  Presence of left artificial knee joint [Z96.652]    SUBJECTIVE  Pain Level (on 0 to 10 scale):  2  / 10   Medication Changes/New allergies or changes in medical history, any new surgeries or procedures?     NO    If yes, update Summary List   Subjective Functional Status/Changes:  []  No changes reported     Functional improvement    Functional limitation i'm sore      OBJECTIVE  Modalities Rationale:     decrease edema, decrease inflammation and decrease pain to improve patient's ability to perform ADLs without pain   min [] Estim, type/location:                                      []  att     []  unatt     []  w/US     []  w/ice    []  w/heat    min []  Mechanical Traction: type/lbs                   []  pro   []  sup   []  int   []  cont    []  before manual    []  after manual    min []  Ultrasound, settings/location:      min []  Iontophoresis w/ dexamethasone, location:                                               []  take home patch       []  in clinic   10 min [x]  Ice     []  Heat    location/position:     min []  Vasopneumatic Device, press/temp:     min []  Other:    [x] Skin assessment post-treatment (if applicable):    [x]  intact    [x]  redness- no adverse reaction     []redness  adverse reaction:        10 min Manual Therapy: DTM to B quads, patellar mobs, passive B knee ext   Rationale:      decrease pain, increase ROM and increase tissue extensibility to improve patient's ability to perform ADLs without pain    25 min Therapeutic Exercise:  [x]  See flow sheet   Rationale:      increase ROM and increase strength to improve the patients ability to perform ADLs without pain          min Patient Education:  Yes    [x] Reviewed HEP   []  Progressed/Changed HEP based on: Other Objective/Functional Measures:    Pt responding well to POC  Able to get full R knee ext and do SLR without lag    Post Treatment Pain Level (on 0 to 10) scale:   0  / 10     ASSESSMENT  Assessment/Changes in Function:     Improved R knee ext AROM     []  See Progress Note/Recertification   Patient will continue to benefit from skilled PT services to modify and progress therapeutic interventions, address functional mobility deficits, address ROM deficits, address strength deficits, analyze and address soft tissue restrictions, analyze and cue movement patterns and analyze and modify body mechanics/ergonomics to attain remaining goals.    Progress toward goals / Updated goals:    Less pain     PLAN  [x]  Upgrade activities as tolerated YES Continue plan of care   []  Discharge due to :    []  Other:      Therapist: Gerry Awad PT    Date: 5/29/2018 Time: 1:45 PM        Future Appointments  Date Time Provider Jmi Pendleton   6/7/2018 11:00 AM Gerry Awad PT REHAB CENTER AT [facility]   6/12/2018 11:00 AM Gerry Awad PT REHAB CENTER AT [facility]   6/15/2018 11:00 AM Gerry Awad PT REHAB CENTER AT [facility]   6/19/2018 11:00 AM Gerry Awad PT REHAB CENTER AT [facility]   6/22/2018 11:00 AM Gerry Awad PT REHAB CENTER AT [facility]   6/26/2018 11:00 AM Gerry Awad PT REHAB CENTER AT [facility]   6/29/2018 11:00 AM Gerry Awad PT REHAB CENTER AT [facility]

## 2018-06-07 ENCOUNTER — HOSPITAL ENCOUNTER (OUTPATIENT)
Dept: PHYSICAL THERAPY | Age: 78
Discharge: HOME | End: 2018-06-07
Payer: MEDICARE

## 2018-06-07 PROCEDURE — 97110 THERAPEUTIC EXERCISES: CPT

## 2018-06-07 NOTE — PROGRESS NOTES
PHYSICAL THERAPY - DAILY TREATMENT NOTE    Patient Name: Jemal Bolton        Date: 2018  : 1940    Patient  Verified: YES  Visit #:     Insurance: Payor: Marta Whitten / Plan: [place] MEDICARE PART A & B / Product Type: Medicare /      In time: 11:05 Out time: 11;30   Total Treatment Time: 25     Medicare Time Tracking (below)   Total Timed Codes (min):  25 1:1 Treatment Time:  25     TREATMENT AREA/ DIAGNOSIS = Unilateral primary osteoarthritis, right knee [M17.11]  Presence of left artificial knee joint [Z96.652]    SUBJECTIVE  Pain Level (on 0 to 10 scale):  1  / 10   Medication Changes/New allergies or changes in medical history, any new surgeries or procedures? NO    If yes, update Summary List   Subjective Functional Status/Changes:  []  No changes reported     Functional improvement less pain   Functional limitation         5 min Manual Therapy: Manual rolling to R thigh, patellar mobs and passive ext   Rationale:      decrease pain, increase ROM and increase tissue extensibility to improve patient's ability to perform ADLs without pain    20 min Therapeutic Exercise:  [x]  See flow sheet   Rationale:      increase ROM and increase strength to improve the patients ability to perform ADLs without pain          min Patient Education:  Yes    [x] Reviewed HEP   []  Progressed/Changed HEP based on:         Other Objective/Functional Measures:    Pt with less knee pain  Pt with limited time today with having to leave at 11:30  No time for ice    Post Treatment Pain Level (on 0 to 10) scale:   0  / 10     ASSESSMENT  Assessment/Changes in Function:     No pain after PT and less pain in general  SLR without ext lag     []  See Progress Note/Recertification   Patient will continue to benefit from skilled PT services to modify and progress therapeutic interventions, address functional mobility deficits, address ROM deficits, address strength deficits, analyze and address soft tissue restrictions, analyze and cue movement patterns, analyze and modify body mechanics/ergonomics and assess and modify postural abnormalities to attain remaining goals.    Progress toward goals / Updated goals:    Less pain and improved ext ROM     PLAN  [x]  Upgrade activities as tolerated YES Continue plan of care   []  Discharge due to :    []  Other:      Therapist: Gino Dunbar PT    Date: 6/7/2018 Time: 12:21 PM        Future Appointments  Date Time Provider Jim Pendleton   6/12/2018 11:00 AM Gino Dunbar PT REHAB CENTER AT [facility]   6/15/2018 11:00 AM Gino Dunbar PT [facility] AT [facility]   6/19/2018 11:00 AM Gino Dunbar PT [facility] AT [facility]   6/22/2018 11:00 AM Gino Dunbar PT RE[facility] AT [facility]   6/26/2018 11:00 AM Gino Dunbar PT RE[facility]ENTER AT [facility]   6/29/2018 11:00 AM Gino Dunbar PT REHAB [facility]ENTER AT [facility]

## 2018-06-12 ENCOUNTER — HOSPITAL ENCOUNTER (OUTPATIENT)
Dept: PHYSICAL THERAPY | Age: 78
Discharge: HOME | End: 2018-06-12
Payer: MEDICARE

## 2018-06-12 PROCEDURE — 97140 MANUAL THERAPY 1/> REGIONS: CPT

## 2018-06-12 PROCEDURE — 97110 THERAPEUTIC EXERCISES: CPT

## 2018-06-15 ENCOUNTER — HOSPITAL ENCOUNTER (OUTPATIENT)
Dept: PHYSICAL THERAPY | Age: 78
Discharge: HOME | End: 2018-06-15
Payer: MEDICARE

## 2018-06-15 PROCEDURE — 97140 MANUAL THERAPY 1/> REGIONS: CPT

## 2018-06-15 PROCEDURE — 97110 THERAPEUTIC EXERCISES: CPT

## 2018-06-15 PROCEDURE — G8978 MOBILITY CURRENT STATUS: HCPCS

## 2018-06-15 PROCEDURE — G8979 MOBILITY GOAL STATUS: HCPCS

## 2018-06-15 NOTE — PROGRESS NOTES
[address] [place] [address] PHYSICAL THERAPY AT [address], [address] [place] [address], [address]  Phone: [phone]  Fax: [phone] OF [address]          Patient Name: Cathie Mcbride : 1940   Treatment/Medical Diagnosis: Unilateral primary osteoarthritis, right knee [M17.11]  Presence of left artificial knee joint [Z96.652]   Onset Date: Chronic    Referral Source: [facility], Not On File, MD Start of [facility]): 18   Prior Hospitalization: See Medical History Provider #: [id number]   Prior Level of Function: Chronic R knee OA, L TKR ()   Comorbidities: None   Medications: Verified on Patient Summary List   Visits from [facility]: 7 Missed Visits: 0     Goal/Measure of Progress Goal Met? 1. Increase balance, as indicated by SLS of > 10 sec L and R   Status at last Eval: < 2 sec L and R Current Status: < 5 sec L and R  progressing   2. Increase strength to allow recipricol on stairs with one handrail   Status at last Eval: Needed 2 handrails or step to gait on stairs  Current Status: Able to negotiate stairs stairs with recipricol gait with one handrail yes   3. Full R knee ext actively   Status at last Eval: -12 degrees of R kne active ext Current Status: -5 degrees of R knee active ext progressing     Key Functional Changes/Progress: the patient is having less pain, with no pain > 3/10 (was up to 8/10). Her active and passive R knee ext has improved.     Problem List: pain affecting function, decrease ROM, decrease strength, edema affecting function, impaired gait/ balance, decrease ADL/ functional abilitiies, decrease activity tolerance and decrease flexibility/ joint mobility   Treatment Plan may include any combination of the following: Therapeutic exercise, Therapeutic activities, Neuromuscular re-education, Physical agent/modality, Gait/balance training, Manual therapy, Patient education and Stair training  Patient Goal(s) has been updated and includes:  \"get stronger\"    Goals for this certification period include and are to be achieved in   4-5  weeks:  1. Full active R knee ext  2. Increase B SLS to > 10 sec, to indicate increased balance  3. All ADLs without R knee pain > 1/10   Frequency / Duration:   Patient to be seen   2   times per week for   4-5    weeks:  G-Codes (GP): Mobility: [id number] Current  CJ= 20-39%   Goal  CI= 1-19%. The severity rating is based on the Other subjective pain scale  Assessments/Recommendations: continue PT  If you have any questions/comments please contact us directly at [phone]. Thank you for allowing us to assist in the care of your patient. Therapist Signature: Poonam Noble PT, MDT Date: 2/76/6904   Certification Period:  Reporting Period: 5/16/18-8/11/8 5/16/18-6/15/18 Time: 1:02 PM   NOTE TO PHYSICIAN:  PLEASE COMPLETE THE ORDERS BELOW AND FAX TO   [facility] at [place]: [phone]. If you are unable to process this request in 24 hours please contact our office: [phone].    ___ I have read the above report and request that my patient continue as recommended.   ___ I have read the above report and request that my patient continue therapy with the following changes/special instructions: ________________________________________________   ___ I have read the above report and request that my patient be discharged from therapy.      Physician Signature:        Date:       Time:

## 2018-06-15 NOTE — PROGRESS NOTES
PHYSICAL THERAPY - DAILY TREATMENT NOTE    Patient Name: Robbie Nugent        Date: 6/15/2018  : 1940    Patient  Verified: YES  Visit #:     Insurance: Payor: Karo Rochat / Plan: [place] & B / Product Type: Medicare /      In time: 11 Out time: 12   Total Treatment Time: 60     Medicare Time Tracking (below)   Total Timed Codes (min):  50 1:1 Treatment Time:  40     TREATMENT AREA/ DIAGNOSIS = Unilateral primary osteoarthritis, right knee [M17.11]  Presence of left artificial knee joint [Z96.652]    SUBJECTIVE  Pain Level (on 0 to 10 scale):  0  / 10   Medication Changes/New allergies or changes in medical history, any new surgeries or procedures?     NO    If yes, update Summary List   Subjective Functional Status/Changes:  [x]  No changes reported     Functional improvement    Functional limitation       OBJECTIVE  Modalities Rationale:     decrease edema, decrease inflammation and increase tissue extensibility to improve patient's ability to perform ADLs without pain   min [] Estim, type/location:                                      []  att     []  unatt     []  w/US     []  w/ice    []  w/heat    min []  Mechanical Traction: type/lbs                   []  pro   []  sup   []  int   []  cont    []  before manual    []  after manual    min []  Ultrasound, settings/location:      min []  Iontophoresis w/ dexamethasone, location:                                               []  take home patch       []  in clinic   10 min [x]  Ice     []  Heat    location/position:     min []  Vasopneumatic Device, press/temp:     min []  Other:    [x] Skin assessment post-treatment (if applicable):    [x]  intact    [x]  redness- no adverse reaction     []redness  adverse reaction:        10 min Manual Therapy: Manual rolling to B quads, patellar mobs, passive R knee ext    Rationale:      decrease pain, increase ROM and increase tissue extensibility to improve patient's ability to perform ADLs without pain    40 min Therapeutic Exercise:  [x]  See flow sheet   Rationale:      increase ROM, increase strength, improve coordination and improve balance to improve the patients ability to perform ADLs without pain          min Patient Education:  Yes    [x] Reviewed HEP   []  Progressed/Changed HEP based on: Other Objective/Functional Measures:    See 701/recert   Post Treatment Pain Level (on 0 to 10) scale:   0  / 10     ASSESSMENT  Assessment/Changes in Function:     See 701/recert     []  See Progress Note/Recertification   Patient will continue to benefit from skilled PT services to modify and progress therapeutic interventions, address functional mobility deficits, address ROM deficits, address strength deficits, analyze and address soft tissue restrictions, analyze and cue movement patterns, analyze and modify body mechanics/ergonomics and assess and modify postural abnormalities to attain remaining goals.    Progress toward goals / Updated goals:    See 701/recert     PLAN  [x]  Upgrade activities as tolerated YES Continue plan of care   []  Discharge due to :    []  Other:      Therapist: Denny Crane PT    Date: 6/15/2018 Time: 12:54 PM        Future Appointments  Date Time Provider Jim Pendleton   6/19/2018 11:00 AM Denny Crane PT [facility]   6/22/2018 11:00 AM Denny Crane PT [facility]   6/26/2018 11:00 AM Denny Crane, PT [facility] AT [facility]   6/29/2018 11:00 AM Denny Crane, PT [facility] AT [facility]

## 2018-06-19 ENCOUNTER — HOSPITAL ENCOUNTER (OUTPATIENT)
Dept: PHYSICAL THERAPY | Age: 78
Discharge: HOME | End: 2018-06-19
Payer: MEDICARE

## 2018-06-19 PROCEDURE — 97110 THERAPEUTIC EXERCISES: CPT

## 2018-06-22 ENCOUNTER — HOSPITAL ENCOUNTER (OUTPATIENT)
Dept: PHYSICAL THERAPY | Age: 78
Discharge: HOME | End: 2018-06-22
Payer: MEDICARE

## 2018-06-22 PROCEDURE — 97110 THERAPEUTIC EXERCISES: CPT

## 2018-06-22 NOTE — PROGRESS NOTES
PHYSICAL THERAPY - DAILY TREATMENT NOTE    Patient Name: Fernanda Smith        Date: 2018  : 1940    Patient  Verified: YES  Visit #:     Insurance: Payor: Juan Pablo Noel / Plan: [place] MEDICARE PART A & B / Product Type: Medicare /      In time: 11:10 Out time: 11:50   Total Treatment Time: 40     Medicare Time Tracking (below)   Total Timed Codes (min):  30 1:1 Treatment Time:  30     TREATMENT AREA/ DIAGNOSIS = Unilateral primary osteoarthritis, right knee [M17.11]  Presence of left artificial knee joint [Z96.652]    SUBJECTIVE  Pain Level (on 0 to 10 scale):  0  / 10   Medication Changes/New allergies or changes in medical history, any new surgeries or procedures?     NO    If yes, update Summary List   Subjective Functional Status/Changes:  []  No changes reported     Functional improvement    Functional limitation i'm hungover      OBJECTIVE  Modalities Rationale:     decrease edema, decrease inflammation and decrease pain to improve patient's ability to perform ADLs without pain   min [] Estim, type/location:                                      []  att     []  unatt     []  w/US     []  w/ice    []  w/heat    min []  Mechanical Traction: type/lbs                   []  pro   []  sup   []  int   []  cont    []  before manual    []  after manual    min []  Ultrasound, settings/location:      min []  Iontophoresis w/ dexamethasone, location:                                               []  take home patch       []  in clinic   10 min [x]  Ice     []  Heat    location/position: B knees    min []  Vasopneumatic Device, press/temp:     min []  Other:    [x] Skin assessment post-treatment (if applicable):    [x]  intact    [x]  redness- no adverse reaction     []redness  adverse reaction:        5 min Manual Therapy: B patellar mobs and Passive ext, manual rolling to R thigh   Rationale:      decrease pain, increase ROM and increase tissue extensibility to improve patient's ability to perform ADLs without pain    25 min Therapeutic Exercise:  [x]  See flow sheet   Rationale:      increase ROM and increase strength to improve the patients ability to perform ADLs without pain          min Patient Education:  Yes    [x] Reviewed HEP   []  Progressed/Changed HEP based on: Other Objective/Functional Measures:    Pt hungover, so we did mat exercises only   Post Treatment Pain Level (on 0 to 10) scale:   0  / 10     ASSESSMENT  Assessment/Changes in Function:     No knee pain today  Full passive ext     []  See Progress Note/Recertification   Patient will continue to benefit from skilled PT services to modify and progress therapeutic interventions, address functional mobility deficits, address ROM deficits, address strength deficits, analyze and address soft tissue restrictions, analyze and cue movement patterns, analyze and modify body mechanics/ergonomics and assess and modify postural abnormalities to attain remaining goals.    Progress toward goals / Updated goals:    Pt progressing well     PLAN  [x]  Upgrade activities as tolerated YES Continue plan of care   []  Discharge due to :    []  Other:      Therapist: Jimi Landon PT    Date: 6/22/2018 Time: 11:55 AM        Future Appointments  Date Time Provider Jim Pendleton   6/26/2018 11:00 AM Jimi Landon PT [facility] AT [facility]   6/29/2018 11:00 AM Jimi Landon PT [facility] AT [facility]

## 2018-06-26 ENCOUNTER — APPOINTMENT (OUTPATIENT)
Dept: PHYSICAL THERAPY | Age: 78
End: 2018-06-26
Payer: MEDICARE

## 2018-06-29 ENCOUNTER — HOSPITAL ENCOUNTER (OUTPATIENT)
Dept: PHYSICAL THERAPY | Age: 78
Discharge: HOME | End: 2018-06-29
Payer: MEDICARE

## 2018-06-29 PROCEDURE — 97110 THERAPEUTIC EXERCISES: CPT

## 2018-06-29 PROCEDURE — 97140 MANUAL THERAPY 1/> REGIONS: CPT

## 2018-06-29 NOTE — PROGRESS NOTES
PHYSICAL THERAPY - DAILY TREATMENT NOTE    Patient Name: Yasmani Denson        Date: 2018  : 1940    Patient  Verified: YES  Visit #:   10   of   12  Insurance: Payor: Chely Montano / Plan: [place] MEDICARE PART A & B / Product Type: Medicare /      In time: 11:05 Out time: 12   Total Treatment Time: 54     Medicare Time Tracking (below)   Total Timed Codes (min):  50 1:1 Treatment Time:  50     TREATMENT AREA/ DIAGNOSIS = Unilateral primary osteoarthritis, right knee [M17.11]  Presence of left artificial knee joint [Z96.652]    SUBJECTIVE  Pain Level (on 0 to 10 scale):  0  / 10   Medication Changes/New allergies or changes in medical history, any new surgeries or procedures? NO    If yes, update Summary List   Subjective Functional Status/Changes:  []  No changes reported     Functional improvement im doing better   Functional limitation my leg gives way on the stairs      OBJECTIVE  Modalities Rationale:     decrease edema, decrease inflammation and decrease pain to improve patient's ability to perform ADLs without pain   min [] Estim, type/location:                                      []  att     []  unatt     []  w/US     []  w/ice    []  w/heat    min []  Mechanical Traction: type/lbs                   []  pro   []  sup   []  int   []  cont    []  before manual    []  after manual    min []  Ultrasound, settings/location:      min []  Iontophoresis w/ dexamethasone, location:                                               []  take home patch       []  in clinic   5 min [x]  Ice     []  Heat    location/position:     min []  Vasopneumatic Device, press/temp:     min []  Other:    [x] Skin assessment post-treatment (if applicable):    [x]  intact    [x]  redness- no adverse reaction     []redness  adverse reaction:        8 min Manual Therapy: Manual rolling of R thigh .  Patellar mobs and passive ext   Rationale:      decrease pain, increase ROM and increase tissue extensibility to improve patient's ability to perform ADLs without pain    42 min Therapeutic Exercise:  [x]  See flow sheet   Rationale:      increase ROM and increase strength to improve the patients ability to perform ADLs without pain          min Patient Education:  Yes    [x] Reviewed HEP   []  Progressed/Changed HEP based on: Other Objective/Functional Measures:    Pt with full active ext   Post Treatment Pain Level (on 0 to 10) scale:   0  / 10     ASSESSMENT  Assessment/Changes in Function:     Low pain  Still reports R knee will give way at times      []  See Progress Note/Recertification   Patient will continue to benefit from skilled PT services to modify and progress therapeutic interventions, address functional mobility deficits, address ROM deficits, address strength deficits, analyze and address soft tissue restrictions, analyze and cue movement patterns, analyze and modify body mechanics/ergonomics and assess and modify postural abnormalities to attain remaining goals.    Progress toward goals / Updated goals:    Low pain levelsapi     PLAN  []  Upgrade activities as tolerated YES Continue plan of care   []  Discharge due to :    []  Other:      Therapist: Luis Antonio Moreira PT    Date: 6/29/2018 Time: 12:58 PM        Future Appointments  Date Time Provider Jim Pendleton   7/10/2018 11:30 AM Luis Antonio Moreira, PT [facility] AT [facility]   7/13/2018 2:30 PM Luis Antonio Moreira, PT [facility] AT [facility]   7/17/2018 11:00 AM Luis Antonio Moreira, PT [facility] AT [facility]   7/20/2018 11:00 AM Luis Antonio Moreira, PT [facility] AT [facility]   7/24/2018 12:30 PM Luis Antonio Moreira, PT [facility] AT [facility]   7/27/2018 11:00 AM Luis Antonio Moreira, PT [facility] AT [facility]   7/31/2018 11:00 AM Luis Antonio Moreira, PT [facility] AT [facility]

## 2018-07-10 ENCOUNTER — HOSPITAL ENCOUNTER (OUTPATIENT)
Dept: PHYSICAL THERAPY | Age: 78
Discharge: HOME | End: 2018-07-10
Payer: MEDICARE

## 2018-07-10 PROCEDURE — G8978 MOBILITY CURRENT STATUS: HCPCS

## 2018-07-10 PROCEDURE — 97110 THERAPEUTIC EXERCISES: CPT

## 2018-07-10 PROCEDURE — G8979 MOBILITY GOAL STATUS: HCPCS

## 2018-07-10 NOTE — PROGRESS NOTES
PHYSICAL THERAPY - DAILY TREATMENT NOTE    Patient Name: Amari Steward        Date: 7/10/2018  : 1940    Patient  Verified: YES  Visit #:     Insurance: Payor: Ridge Solders / Plan: [place] MEDICARE PART A & B / Product Type: Medicare /      In time: 11:35 Out time: 12   Total Treatment Time: 25     Medicare Time Tracking (below)   Total Timed Codes (min):  25 1:1 Treatment Time:  25     TREATMENT AREA/ DIAGNOSIS = Unilateral primary osteoarthritis, right knee [M17.11]  Presence of left artificial knee joint [Z96.652]    SUBJECTIVE  Pain Level (on 0 to 10 scale):  0  / 10   Medication Changes/New allergies or changes in medical history, any new surgeries or procedures? NO    If yes, update Summary List   Subjective Functional Status/Changes:  []  No changes reported     Functional improvement no R knee pain   Functional limitation my L knee hurts      OBJECTIVE      5 min Manual Therapy: Manual rolling to R quad, patellar mobs and passive R knee ext    Rationale:      decrease pain, increase ROM and increase tissue extensibility to improve patient's ability to perform ADLs without pain    20 min Therapeutic Exercise:  [x]  See flow sheet   Rationale:      increase ROM and increase strength to improve the patients ability to perform ADLs without pain          min Patient Education:  Yes    [x] Reviewed HEP   []  Progressed/Changed HEP based on:         Other Objective/Functional Measures:    See Rosario1/poly   Post Treatment Pain Level (on 0 to 10) scale:   0  / 10     ASSESSMENT  Assessment/Changes in Function:     See 701/recert     []  See Progress Note/Recertification   Patient will continue to benefit from skilled PT services to modify and progress therapeutic interventions, address functional mobility deficits, address ROM deficits, address strength deficits, analyze and address soft tissue restrictions, analyze and cue movement patterns and analyze and modify body mechanics/ergonomics to attain remaining goals.    Progress toward goals / Updated goals:    See 701/recert     PLAN  [x]  Upgrade activities as tolerated YES Continue plan of care   []  Discharge due to :    []  Other:      Therapist: Kay Grider PT    Date: 7/10/2018 Time: 12:03 PM        Future Appointments  Date Time Provider Jim Pendleton   7/13/2018 2:30 PM Kay Grider PT [facility] AT [facility]   7/17/2018 11:00 AM Kay Grider PT [facility]   7/20/2018 11:00 AM Kay Grider PT [facility]   7/24/2018 12:30 PM Kay Grider PT [facility]   7/27/2018 11:00 AM Kay Grider PT [facility]   7/31/2018 11:00 AM Kay Grider PT [facility] AT [facility]

## 2018-07-10 NOTE — PROGRESS NOTES
[facility] PHYSICAL THERAPY AT [address], [address] [place] [address], [address], [address] [place] [address]  Phone: [phone]  Fax: [phone] OF [facility] [address] [place]          Patient Name: Mayte Blankenship : 1940   Treatment/Medical Diagnosis: Unilateral primary osteoarthritis, right knee [M17.11]  Presence of left artificial knee joint [Z96.652]   Onset Date: Chronic    Referral Source: [facility], Not On File, [place] Start of [facility]): 18   Prior Hospitalization: See Medical History Provider #: [id number]   Prior Level of Function: Chronic R knee OA, L TKR ()   Comorbidities: None   Medications: Verified on Patient Summary List   Visits from [facility]: 11 Missed Visits: 0     Goal/Measure of Progress Goal Met? 1. Increase balance, as indicated by SLS of > 10 sec  R   Status at last Eval: < 5 sec L and R Current Status: < 7 sec and R  progressing   2. All ADLs without R knee pain > 1/10    Status at last Eval: R knee pain up to 8/10 Current Status: R knee pain < =2/10 progressing   3. Full R knee ext actively   Status at last Eval: -5 degrees of R kne active ext Current Status: Full R active knee ext Yes     Key Functional Changes/Progress: the patient is having less R knee pain and has improved extension.  R knee is stronger, but still \"giving way\"    Problem List: pain affecting function, decrease ROM, decrease strength, edema affecting function, impaired gait/ balance, decrease ADL/ functional abilitiies, decrease activity tolerance and decrease flexibility/ joint mobility   Treatment Plan may include any combination of the following: Therapeutic exercise, Therapeutic activities, Neuromuscular re-education, Physical agent/modality, Gait/balance training, Manual therapy, Patient education and Stair training  Patient Goal(s) has been updated and includes:  \"get stronger\"    Goals for this certification period include and are to be achieved in   4-5 weeks: 1. Full active R knee ext  2. Increase B SLS to > 10 sec, to indicate increased balance  3. All ADLs without R knee pain > 1/10   Frequency / Duration:   Patient to be seen   2   times per week for   4-5    weeks:  G-Codes (GP): Mobility: [id number] Current  CJ= 20-39%   Goal  CI= 1-19%. The severity rating is based on the Other subjective pain scale  Assessments/Recommendations: continue PT  If you have any questions/comments please contact us directly at [phone]. Thank you for allowing us to assist in the care of your patient. Therapist Signature: Jose Ramon Peralta PT, MDT Date: 8/76/2187   Certification Period:  Reporting Period: 5/16/18-8/11/8  6/15/18-7/10/18 Time: 1:02 PM   NOTE TO PHYSICIAN:  PLEASE COMPLETE THE ORDERS BELOW AND FAX TO   [facility] [place]: [phone]. If you are unable to process this request in 24 hours please contact our office: [phone].    ___ I have read the above report and request that my patient continue as recommended.   ___ I have read the above report and request that my patient continue therapy with the following changes/special instructions: ________________________________________________   ___ I have read the above report and request that my patient be discharged from therapy.      Physician Signature:        Date:       Time:

## 2018-07-13 ENCOUNTER — HOSPITAL ENCOUNTER (OUTPATIENT)
Dept: PHYSICAL THERAPY | Age: 78
Discharge: HOME | End: 2018-07-13
Payer: MEDICARE

## 2018-07-13 PROCEDURE — 97110 THERAPEUTIC EXERCISES: CPT

## 2018-07-13 PROCEDURE — 97140 MANUAL THERAPY 1/> REGIONS: CPT

## 2018-07-13 NOTE — PROGRESS NOTES
PHYSICAL THERAPY - DAILY TREATMENT NOTE    Patient Name: Tahmina Samuel        Date: 2018  : 1940    Patient  Verified: YES  Visit #:      16  Insurance: Payor: Finn Butler / Plan: [place] MEDICARE PART A & B / Product Type: Medicare /      In time: 2:35 Out time: 3:30   Total Treatment Time: 54     Medicare Time Tracking (below)   Total Timed Codes (min):  50 1:1 Treatment Time:  25     TREATMENT AREA/ DIAGNOSIS = Unilateral primary osteoarthritis, right knee [M17.11]  Presence of left artificial knee joint [Z96.652]    SUBJECTIVE  Pain Level (on 0 to 10 scale):  0  / 10   Medication Changes/New allergies or changes in medical history, any new surgeries or procedures?     NO    If yes, update Summary List   Subjective Functional Status/Changes:  []  No changes reported     Functional improvement no pain   Functional limitation       OBJECTIVE  Modalities Rationale:     decrease edema, decrease inflammation and decrease pain to improve patient's ability to perform ADLs without pain   min [] Estim, type/location:                                      []  att     []  unatt     []  w/US     []  w/ice    []  w/heat    min []  Mechanical Traction: type/lbs                   []  pro   []  sup   []  int   []  cont    []  before manual    []  after manual    min []  Ultrasound, settings/location:      min []  Iontophoresis w/ dexamethasone, location:                                               []  take home patch       []  in clinic   5 min [x]  Ice     []  Heat    location/position:     min []  Vasopneumatic Device, press/temp:     min []  Other:    [x] Skin assessment post-treatment (if applicable):    [x]  intact    [x]  redness- no adverse reaction     []redness  adverse reaction:        8 min Manual Therapy: B knee ext, patellar mobs and DTM to R quad   Rationale:      decrease pain, increase ROM and increase tissue extensibility to improve patient's ability to perform ADLs without pain    42 min Therapeutic Exercise:  [x]  See flow sheet   Rationale:      increase ROM and increase strength to improve the patients ability to perform ADLs without pain          min Patient Education:  Yes    [x] Reviewed HEP   []  Progressed/Changed HEP based on: Other Objective/Functional Measures:    Pt with no pain, full active R knee ext after passive ext and R LSR without lag  L knee lacks full ext and has lag with SLR   Post Treatment Pain Level (on 0 to 10) scale:   0  / 10     ASSESSMENT  Assessment/Changes in Function:     Improving ROM and low pain     []  See Progress Note/Recertification   Patient will continue to benefit from skilled PT services to modify and progress therapeutic interventions, address functional mobility deficits, address ROM deficits, address strength deficits, analyze and address soft tissue restrictions, analyze and cue movement patterns, analyze and modify body mechanics/ergonomics and assess and modify postural abnormalities to attain remaining goals.    Progress toward goals / Updated goals:    Less pain     PLAN  [x]  Upgrade activities as tolerated YES Continue plan of care   []  Discharge due to :    []  Other:      Therapist: Orly Garsia PT    Date: 7/13/2018 Time: 3:49 PM        Future Appointments  Date Time Provider Jim Pendleton   7/17/2018 11:00 AM Orly Garsia PT [facility] AT [facility]   7/20/2018 11:00 AM Orly Garsia PT [facility] AT [facility]   7/24/2018 12:30 PM Orly Garsia PT [facility] AT [facility]   7/27/2018 11:00 AM Orly Garsia PT [facility] AT [facility]   7/31/2018 11:00 AM Orly Garsia PT [facility] AT [facility]

## 2018-07-20 ENCOUNTER — HOSPITAL ENCOUNTER (OUTPATIENT)
Dept: PHYSICAL THERAPY | Age: 78
Discharge: HOME | End: 2018-07-20
Payer: MEDICARE

## 2018-07-20 PROCEDURE — 97110 THERAPEUTIC EXERCISES: CPT

## 2018-07-20 PROCEDURE — 97140 MANUAL THERAPY 1/> REGIONS: CPT

## 2018-07-20 NOTE — PROGRESS NOTES
PHYSICAL THERAPY - DAILY TREATMENT NOTE    Patient Name: Mary Jane Varma        Date: 2018  : 1940    Patient  Verified: YES  Visit #:   15   of   16  Insurance: Payor: Juan Ice / Plan: [place] MED[place] PART A & B / Product Type: Medicare /      In time: 11:10 Out time: 12   Total Treatment Time: 50     Medicare Time Tracking (below)   Total Timed Codes (min):  40 1:1 Treatment Time:  40     TREATMENT AREA/ DIAGNOSIS = Unilateral primary osteoarthritis, right knee [M17.11]  Presence of left artificial knee joint [Z96.652]    SUBJECTIVE  Pain Level (on 0 to 10 scale):  2  / 10   Medication Changes/New allergies or changes in medical history, any new surgeries or procedures?     NO    If yes, update Summary List   Subjective Functional Status/Changes:  [x]  No changes reported     Functional improvement    Functional limitation       OBJECTIVE  Modalities Rationale:     decrease edema, decrease inflammation and decrease pain to improve patient's ability to perform ADLs without pain   min [] Estim, type/location:                                      []  att     []  unatt     []  w/US     []  w/ice    []  w/heat    min []  Mechanical Traction: type/lbs                   []  pro   []  sup   []  int   []  cont    []  before manual    []  after manual    min []  Ultrasound, settings/location:      min []  Iontophoresis w/ dexamethasone, location:                                               []  take home patch       []  in clinic   10 min [x]  Ice     []  Heat    location/position: B knees    min []  Vasopneumatic Device, press/temp:     min []  Other:    [x] Skin assessment post-treatment (if applicable):    [x]  intact    [x]  redness- no adverse reaction     []redness  adverse reaction:        8 min Manual Therapy: DTM to R quad, patellar mobs and passive ext   Rationale:      decrease pain, increase ROM and increase tissue extensibility to improve patient's ability to perform ADLs without pain    32 min Therapeutic Exercise:  [x]  See flow sheet   Rationale:      increase ROM, increase strength and improve balance to improve the patients ability to perform ADLs without pain          min Patient Education:  Yes    [x] Reviewed HEP   []  Progressed/Changed HEP based on: Other Objective/Functional Measures:    Full passive R knee ext  Improving SLS balalnce   Post Treatment Pain Level (on 0 to 10) scale:   0  / 10     ASSESSMENT  Assessment/Changes in Function:     Improving strength and ROM     []  See Progress Note/Recertification   Patient will continue to benefit from skilled PT services to modify and progress therapeutic interventions, address functional mobility deficits, address ROM deficits, address strength deficits, analyze and address soft tissue restrictions, analyze and cue movement patterns, analyze and modify body mechanics/ergonomics and assess and modify postural abnormalities to attain remaining goals.    Progress toward goals / Updated goals:    Able to  SR position with hands in high guard position, for 30 seconds      PLAN  [x]  Upgrade activities as tolerated YES Continue plan of care   []  Discharge due to :    []  Other:      Therapist: Francisco Brandt PT    Date: 7/20/2018 Time: 1:28 PM        Future Appointments  Date Time Provider Jim Pendleton   7/24/2018 12:30 PM Francisco Brandt PT REHAB [facility] AT [facility]   7/27/2018 11:00 AM Francisco Brandt PT [facility] AT [facility]   7/31/2018 11:00 AM Francisco Brandt PT REHAB [facility]ENTER AT [facility]

## 2018-07-27 ENCOUNTER — APPOINTMENT (OUTPATIENT)
Dept: PHYSICAL THERAPY | Age: 78
End: 2018-07-27
Payer: MEDICARE

## 2018-08-15 ENCOUNTER — HOSPITAL ENCOUNTER (OUTPATIENT)
Dept: PHYSICAL THERAPY | Age: 78
Discharge: HOME | End: 2018-08-15
Payer: MEDICARE

## 2018-08-15 PROCEDURE — 97110 THERAPEUTIC EXERCISES: CPT

## 2018-08-15 PROCEDURE — G8978 MOBILITY CURRENT STATUS: HCPCS

## 2018-08-15 PROCEDURE — G8979 MOBILITY GOAL STATUS: HCPCS

## 2018-08-15 PROCEDURE — 97140 MANUAL THERAPY 1/> REGIONS: CPT

## 2018-08-15 NOTE — PROGRESS NOTES
PHYSICAL THERAPY - DAILY TREATMENT NOTE    Patient Name: Margit Jeans        Date: 8/15/2018  : 1940    Patient  Verified: YES  Visit #:   15   of   16  Insurance: Payor: Evy Desir / Plan: [place] MEDICARE PART A & B / Product Type: Medicare /      In time: 11:30 Out time: 12:20   Total Treatment Time: 50     Medicare Time Tracking (below)   Total Timed Codes (min):  40 1:1 Treatment Time:  40     TREATMENT AREA/ DIAGNOSIS = Unilateral primary osteoarthritis, right knee [M17.11]  Presence of left artificial knee joint [Z96.652]    SUBJECTIVE  Pain Level (on 0 to 10 scale):  0  / 10   Medication Changes/New allergies or changes in medical history, any new surgeries or procedures?     NO    If yes, update Summary List   Subjective Functional Status/Changes:  []  No changes reported     Functional improvement no pain   Functional limitation my knee still gives way      OBJECTIVE  Modalities Rationale:     decrease edema, decrease inflammation and decrease pain to improve patient's ability to perform ADLs without pain   min [] Estim, type/location:                                      []  att     []  unatt     []  w/US     []  w/ice    []  w/heat    min []  Mechanical Traction: type/lbs                   []  pro   []  sup   []  int   []  cont    []  before manual    []  after manual    min []  Ultrasound, settings/location:      min []  Iontophoresis w/ dexamethasone, location:                                               []  take home patch       []  in clinic   10 min [x]  Ice     []  Heat    location/position:     min []  Vasopneumatic Device, press/temp:     min []  Other:    [x] Skin assessment post-treatment (if applicable):    [x]  intact    [x]  redness- no adverse reaction     []redness  adverse reaction:        8 min Manual Therapy: DTM to R quad, patellar mobs and passive knee extension   Rationale:      decrease pain, increase ROM and increase tissue extensibility to improve patient's ability to perform ADLs without pain    32 min Therapeutic Exercise:  [x]  See flow sheet   Rationale:      increase ROM and increase strength to improve the patients ability to perform ADLs without pain          min Patient Education:  Yes    [x] Reviewed HEP   []  Progressed/Changed HEP based on: Other Objective/Functional Measures:    See 701/recert   Post Treatment Pain Level (on 0 to 10) scale:   0  / 10     ASSESSMENT  Assessment/Changes in Function:     See 701/recert     []  See Progress Note/Recertification   Patient will continue to benefit from skilled PT services to modify and progress therapeutic interventions, address functional mobility deficits, address ROM deficits, address strength deficits, analyze and address soft tissue restrictions, analyze and cue movement patterns, analyze and modify body mechanics/ergonomics and assess and modify postural abnormalities to attain remaining goals.    Progress toward goals / Updated goals:    See 701/recert     PLAN  [x]  Upgrade activities as tolerated YES Continue plan of care   []  Discharge due to :    []  Other:      Therapist: Jass Duron PT    Date: 8/15/2018 Time: 1:26 PM        Future Appointments  Date Time Provider Jim Pendleton   8/24/2018 11:00 AM Jass Duron, PT REH[facility]ENTER AT [facility]   9/4/2018 11:00 AM Jass Duron, PT [facility] AT [facility]   9/7/2018 10:30 AM Jass Duron, PT [facility] AT [facility]   9/11/2018 11:00 AM Vincent Godinez, PT [facility] AT [facility]   9/14/2018 11:00 AM Vincent Godinez, PT [facility] AT [facility]   9/18/2018 11:00 AM Jesus Camarillo, PT [facility] AT [facility]   9/21/2018 11:00 AM Anthony Garner, PT [facility] AT [facility]   9/25/2018 11:00 AM Jass Duron, PT [facility] AT [facility]   9/28/2018 10:30 AM Jass Duron, PT RE[facility]ENTER AT [facility]

## 2018-08-15 NOTE — PROGRESS NOTES
[address] [place] [address] PHYSICAL THERAPY AT [address] [facility], [address] [facility], [address], [address] [place] [address]  Phone: [phone]  Fax: [phone] OF [address]          Patient Name: Amari Steward : 1940   Treatment/Medical Diagnosis: Unilateral primary osteoarthritis, right knee [M17.11]  Presence of left artificial knee joint [Z96.652]   Onset Date: Chronic    Referral Source: Flaco Young [facility]): 18   Prior Hospitalization: See Medical History Provider #: [id number]   Prior Level of Function: Chronic R knee OA, L TKR ()   Comorbidities: None   Medications: Verified on Patient Summary List   Visits from [facility]: 14 Missed Visits: 5     Goal/Measure of Progress Goal Met? 1. Increase balance, as indicated by SLS of > 10 sec  R   Status at last Eval: < 7 sec L and R Current Status: < 10 sec L and R  progressing   2. All ADLs without R knee pain > 1/10    Status at last Eval: R knee pain up to 2/10 Current Status: R knee pain < =3/10 progressing   3. Full R knee ext actively   Status at last Eval: -5 degrees of R kne active ext Current Status: Full R active knee ext Yes     Key Functional Changes/Progress: the patient has missed some time with a busy schedule. She wants to resume PT for another month. She now has full active R knee extension. However, she is still having episodes where her R knee \"gives way\".      Problem List: pain affecting function, decrease ROM, decrease strength, edema affecting function, impaired gait/ balance, decrease ADL/ functional abilitiies, decrease activity tolerance and decrease flexibility/ joint mobility   Treatment Plan may include any combination of the following: Therapeutic exercise, Therapeutic activities, Neuromuscular re-education, Physical agent/modality, Gait/balance training, Manual therapy, Patient education and Stair training  Patient Goal(s) has been updated and includes:  \"get stronger\"    Goals for this certification period include and are to be achieved in   4-5  weeks:  1. Pt to report no episodes of R knee \"giving way\"  2. Increase B SLS to > 10 sec, to indicate increased balance  3. All ADLs without R knee pain > 1/10   Frequency / Duration:   Patient to be seen   2   times per week for   4-5    weeks:  G-Codes (GP): Mobility: [id number] Current  CJ= 20-39%   Goal  CI= 1-19%. The severity rating is based on the Other subjective pain scale  Assessments/Recommendations: continue PT  If you have any questions/comments please contact us directly at [phone]. Thank you for allowing us to assist in the care of your patient. Therapist Signature: Kay Grider PT, MDT Date: 6/90/9125   Certification Period:  Reporting Period: 8/11/18-10/8/18  7/10/18-8/15/18 Time: 1:02 PM   NOTE TO PHYSICIAN:  PLEASE COMPLETE THE ORDERS BELOW AND FAX TO   [facility] at [place]: [phone]. If you are unable to process this request in 24 hours please contact our office: [phone].    ___ I have read the above report and request that my patient continue as recommended.   ___ I have read the above report and request that my patient continue therapy with the following changes/special instructions: ________________________________________________   ___ I have read the above report and request that my patient be discharged from therapy.      Physician Signature:        Date:       Time:

## 2018-08-21 ENCOUNTER — APPOINTMENT (OUTPATIENT)
Dept: PHYSICAL THERAPY | Age: 78
End: 2018-08-21
Payer: MEDICARE

## 2018-08-24 ENCOUNTER — APPOINTMENT (OUTPATIENT)
Dept: PHYSICAL THERAPY | Age: 78
End: 2018-08-24
Payer: MEDICARE

## 2018-09-04 ENCOUNTER — HOSPITAL ENCOUNTER (OUTPATIENT)
Dept: PHYSICAL THERAPY | Age: 78
Discharge: HOME | End: 2018-09-04
Payer: MEDICARE

## 2018-09-04 PROCEDURE — 97110 THERAPEUTIC EXERCISES: CPT

## 2018-09-04 NOTE — PROGRESS NOTES
PHYSICAL THERAPY - DAILY TREATMENT NOTE Patient Name: Patricia Rodriguez        Date: 2018 : 1940    Patient  Verified: Lissette Mancia Visit #:   15   of   20  Insurance: Payor: [address] [place] [address] / [facility] / Product Type: Medicare / In time: 11:05 Out time: 11:55 Total Treatment Time: 45 Medicare Time Tracking (below) Total Timed Codes (min):  45 1:1 Treatment Time:  25 TREATMENT AREA/ DIAGNOSIS = Unilateral primary osteoarthritis, right knee [M17.11] Presence of left artificial knee joint [Z96.652] SUBJECTIVE Pain Level (on 0 to 10 scale):  0  / 10 Medication Changes/New allergies or changes in medical history, any new surgeries or procedures? NO    If yes, update Summary List  
Subjective Functional Status/Changes:  []  No changes reported Functional improvement Functional limitation my R knee feels better than my L knee OBJECTIVE Modalities Rationale:     decrease edema, decrease inflammation and decrease pain to improve patient's ability to perform ADLs without pain 
 min [] Estim, type/location:    
                                 []  att     []  unatt     []  w/US     []  w/ice    []  w/heat  
 min []  Mechanical Traction: type/lbs   
               []  pro   []  sup   []  int   []  cont    []  before manual    []  after manual  
 min []  Ultrasound, settings/location:    
 min []  Iontophoresis w/ dexamethasone, location:   
                                           []  take home patch       []  in clinic  
5 min [x]  Ice     []  Heat    location/position:   
 min []  Vasopneumatic Device, press/temp:   
 min []  Other:   
[x] Skin assessment post-treatment (if applicable):   
[x]  intact    [x]  redness- no adverse reaction    
[]redness  adverse reaction:     
 
5 min Manual Therapy: Manual rolling of R thigh, patellar mobs and passive knee ext Rationale:      decrease pain, increase ROM and increase tissue extensibility to improve patient's ability to perform ADLs without pain 40 min Therapeutic Exercise:  [x]  See flow sheet Rationale:      increase ROM, increase strength and improve balance to improve the patients ability to perform ADLs without pain  
 
 
 
 min Patient Education:  Yes    [x] Reviewed HEP []  Progressed/Changed HEP based on: Other Objective/Functional Measures: Pt with less R knee cecile than L knee pain Post Treatment Pain Level (on 0 to 10) scale:   0  / 10 ASSESSMENT Assessment/Changes in Function:  
 
Still having episodes of R knee \"giving way\" []  See Progress Note/Recertification Patient will continue to benefit from skilled PT services to modify and progress therapeutic interventions, address functional mobility deficits, address ROM deficits, address strength deficits, analyze and address soft tissue restrictions, analyze and cue movement patterns, analyze and modify body mechanics/ergonomics and assess and modify postural abnormalities to attain remaining goals. Progress toward goals / Updated goals: 
 
Less pain in R knee with stairs PLAN [x]  Upgrade activities as tolerated YES Continue plan of care  
[]  Discharge due to :   
[]  Other:   
 
Therapist: James Benz PT Date: 9/4/2018 Time: 12:18 PM  
 
  
Future Appointments Date Time Provider Jim Pendleton 9/7/2018 10:30 AM James Benz PT REHAB CENTER AT [facility]  
9/18/2018 11:00 AM Elmore Kocher Vidrine, PT REHAB CENTER AT [facility]  
9/21/2018 11:00 AM Shani Richter PT REHAB CENTER AT [facility]  
9/25/2018 11:00 AM James Benz PT REHAB CENTER AT [facility]  
9/28/2018 10:30 AM James Benz PT REHAB CENTER AT [facility]

## 2018-09-06 ENCOUNTER — APPOINTMENT (OUTPATIENT)
Dept: PHYSICAL THERAPY | Age: 78
End: 2018-09-06
Payer: MEDICARE

## 2018-09-07 ENCOUNTER — HOSPITAL ENCOUNTER (OUTPATIENT)
Dept: PHYSICAL THERAPY | Age: 78
Discharge: HOME | End: 2018-09-07
Payer: MEDICARE

## 2018-09-07 PROCEDURE — G8979 MOBILITY GOAL STATUS: HCPCS

## 2018-09-07 PROCEDURE — 97110 THERAPEUTIC EXERCISES: CPT

## 2018-09-07 PROCEDURE — 97140 MANUAL THERAPY 1/> REGIONS: CPT

## 2018-09-07 PROCEDURE — G8980 MOBILITY D/C STATUS: HCPCS

## 2018-09-07 NOTE — PROGRESS NOTES
PHYSICAL THERAPY - DAILY TREATMENT NOTE Patient Name: Ash Purchase        Date: 2018 : 1940    Patient  Verified: Pavan Snell Visit #:     Insurance: Payor: Jordan Jimenez / Plan: Ronak Lopez / Product Type: Medicare / In time: 10:40 Out time: 11:35 Total Treatment Time: 54 Medicare Time Tracking (below) Total Timed Codes (min):  45 1:1 Treatment Time:  25 TREATMENT AREA/ DIAGNOSIS = Unilateral primary osteoarthritis, right knee [M17.11] Presence of left artificial knee joint [Z96.652] SUBJECTIVE Pain Level (on 0 to 10 scale):  1  / 10 Medication Changes/New allergies or changes in medical history, any new surgeries or procedures? NO    If yes, update Summary List  
Subjective Functional Status/Changes:  [x]  No changes reported Functional improvement Functional limitation OBJECTIVE Modalities Rationale:     decrease edema, decrease inflammation and decrease pain to improve patient's ability to perform ADLs without pain 
 min [] Estim, type/location:    
                                 []  att     []  unatt     []  w/US     []  w/ice    []  w/heat  
 min []  Mechanical Traction: type/lbs   
               []  pro   []  sup   []  int   []  cont    []  before manual    []  after manual  
 min []  Ultrasound, settings/location:    
 min []  Iontophoresis w/ dexamethasone, location:   
                                           []  take home patch       []  in clinic  
10 min [x]  Ice     []  Heat    location/position: B knees  
 min []  Vasopneumatic Device, press/temp:   
 min []  Other:   
[x] Skin assessment post-treatment (if applicable):   
[x]  intact    [x]  redness- no adverse reaction    
[]redness  adverse reaction:     
 
8 min Manual Therapy: Manual rolling to B thighs, R patellar mobs and B passive knee ext Rationale:      decrease pain, increase ROM and increase tissue extensibility to improve patient's ability to perform ADLs without pain 47 min Therapeutic Exercise:  [x]  See flow sheet Rationale:      increase ROM, increase strength and improve balance to improve the patients ability to perform ADLs without pain  
 
 
 
 min Patient Education:  Yes    [x] Reviewed HEP []  Progressed/Changed HEP based on: Other Objective/Functional Measures: Pt with L > R knee pain lately Still lacking full R knee active ext Post Treatment Pain Level (on 0 to 10) scale:   0  / 10 ASSESSMENT Assessment/Changes in Function: No pain after therapy Still having episodes of knee giving way 
  
[]  See Progress Note/Recertification Patient will continue to benefit from skilled PT services to modify and progress therapeutic interventions, address functional mobility deficits, address ROM deficits, address strength deficits, analyze and address soft tissue restrictions, analyze and cue movement patterns, analyze and modify body mechanics/ergonomics and assess and modify postural abnormalities to attain remaining goals. Progress toward goals / Updated goals: 
 
Less pain and improved strength PLAN [x]  Upgrade activities as tolerated YES Continue plan of care  
[]  Discharge due to :   
[]  Other:   
 
Therapist: Gino Mcgrath PT Date: 9/7/2018 Time: 11:37 AM  
 
  
Future Appointments Date Time Provider Jim Pendleton 9/18/2018 11:00 AM Berhane Camarillo, PT REHAB CENTER AT [facility]  
9/21/2018 11:00 AM Maria Eugenia Larkin, PT REH[facility]ENTER AT [facility]  
9/25/2018 11:00 AM Gino Mcgrath PT REH[facility] CENTER AT [facility]  
9/28/2018 10:30 AM Gino Mcgrath PT REHAB CENTER AT [facility]

## 2018-09-11 ENCOUNTER — APPOINTMENT (OUTPATIENT)
Dept: PHYSICAL THERAPY | Age: 78
End: 2018-09-11
Payer: MEDICARE

## 2018-09-14 ENCOUNTER — APPOINTMENT (OUTPATIENT)
Dept: PHYSICAL THERAPY | Age: 78
End: 2018-09-14
Payer: MEDICARE

## 2018-09-18 ENCOUNTER — APPOINTMENT (OUTPATIENT)
Dept: PHYSICAL THERAPY | Age: 78
End: 2018-09-18
Payer: MEDICARE

## 2018-09-21 ENCOUNTER — APPOINTMENT (OUTPATIENT)
Dept: PHYSICAL THERAPY | Age: 78
End: 2018-09-21
Payer: MEDICARE

## 2018-09-25 ENCOUNTER — HOSPITAL ENCOUNTER (OUTPATIENT)
Dept: PHYSICAL THERAPY | Age: 78
End: 2018-09-25
Payer: MEDICARE

## 2018-09-27 ENCOUNTER — APPOINTMENT (OUTPATIENT)
Dept: PHYSICAL THERAPY | Age: 78
End: 2018-09-27
Payer: MEDICARE

## 2018-09-28 ENCOUNTER — APPOINTMENT (OUTPATIENT)
Dept: PHYSICAL THERAPY | Age: 78
End: 2018-09-28
Payer: MEDICARE

## 2018-10-01 ENCOUNTER — APPOINTMENT (OUTPATIENT)
Dept: PHYSICAL THERAPY | Age: 78
End: 2018-10-01

## 2021-04-19 ENCOUNTER — HOSPITAL ENCOUNTER (OUTPATIENT)
Dept: PHYSICAL THERAPY | Age: 81
Discharge: HOME | End: 2021-04-19
Payer: MEDICARE

## 2021-04-19 PROCEDURE — 97535 SELF CARE MNGMENT TRAINING: CPT

## 2021-04-19 PROCEDURE — 97161 PT EVAL LOW COMPLEX 20 MIN: CPT

## 2021-04-19 NOTE — PROGRESS NOTES
[address] [facility] [place]  [address] [facility] [address] [facility], [place], [address] - Phone: [phone]  Fax: [phone] / [address] [facility]  Patient Name: Avi Juan : 1940   Treatment   Diagnosis: Low back pain Medical   Diagnosis: Low back pain [M54.5]   Onset Date: 2020     Referral Source: Angela Chand MD [facility]): 2021   Prior Hospitalization: See medical history Provider #: [id number]   Prior Level of Function: Pt was pain free with ADLs   Comorbidities: Pt reports high blood pressure and thyroid issues    Medications: Verified on Patient Summary List   The Plan of Care and following information is based on the information from the initial evaluation.   ==================================================================================  Assessment / key information:  Pt is a 69 yo female that presents to PT with chief complaint of LBP pain that began about 2 months ago with no known mechanism of injury. He/she presents with pain ranging from 4-10/10, located on the R side of the lumbar spine. Pain is made worse with standing and prolonged positioning, better with medication. Pt has no radicular sxs in the BLE  L/S AROM: flexion 50%, extension 50%, LSB 50%, RSB 50%  Palpation reveals TTP to R SIJ. Posture: increased lordosis in L/S and increased kyphosis in T/S. MMT LEs: 4+/5 grossly on B LE. Core stability is fair. Repeated movement testing reveals no change in symptoms. During palpation, R ASIS inferior to contralateral side indicative of R anteriorly rotated innominate. MRI showed increased lumbar Lordosis due to retrolisthesis and baastrup-type edema. Pt will benefit from PT interventions to address the aforementioned deficits and allow pt to return to PLOF.    Eval Complexity: History LOW Complexity : Zero comorbidities / personal factors that will impact the outcome / POC;  Examination  LOW Complexity : 1-2 Standardized tests and measures addressing body structure, function, activity limitation and / or participation in recreation ; Presentation LOW Complexity : Stable, uncomplicated ;  Decision Making MEDIUM Complexity : FOTO score of 26-74; Overall Complexity MEDIUM  ==================================================================================  Problem List: pain affecting function, decrease ROM, decrease strength, decrease ADL/ functional abilitiies, decrease activity tolerance and decrease flexibility/ joint mobility   Treatment Plan may include any combination of the following: Therapeutic exercise, Therapeutic activities, Neuromuscular re-education, Physical agent/modality, Gait/balance training, Manual therapy, Patient education, Self Care training and Functional mobility training  Patient / Family readiness to learn indicated by: asking questions, trying to perform skills and interest  Persons(s) to be included in education: patient (P)  Barriers to Learning/Limitations: no  Measures taken:    Patient Goal (s): \"have no pain\"   Patient self reported health status: good  Rehabilitation Potential: good   Short Term Goals: To be accomplished in  3  weeks:  1. Pt will be independent and compliant with HEP to decrease pain, increase ROM and return pt to PLOF. 2. Pt will demonstrate a GROC score of >/= +2 to show overall improvement in function     Long Term Goals: To be accomplished in  6  weeks:  1. Increase score on FOTO to > or = 60 to demo an increase in functional activity tolerance. 2. Pt will note < or = 2/10 pain with all mobility to improve comfort with ADLs.    3. Pt will demonstrate a GROC score of >/= +5 to show overall improvement in function  Frequency / Duration:   Patient to be seen  2  times per week for 6  weeks:  Patient / Caregiver education and instruction: self care, activity modification and exercises    Therapist Signature: Erin Rios PT Date: 3/20/1548   Certification Period: 4/19/2021-7/19/2021 Time: 3:00 PM   ===========================================================================================  I certify that the above Physical Therapy Services are being furnished while the patient is under my care. I agree with the treatment plan and certify that this therapy is necessary. Physician Signature:        Date:       Time:        Bar Penn MD    Please sign and return to [facility] [place] or you may fax the signed copy to [phone]. Thank you.

## 2021-04-19 NOTE — PROGRESS NOTES
PHYSICAL THERAPY - DAILY TREATMENT NOTE    Patient Name: Rosie Valiente        Date: 2021  : 1940    Patient  Verified: YES  Visit #:     Insurance: Payor: Emelina Morel / Plan: [place] B / Product Type: Medicare /      In time: 1:10 Out time: 2:00   Total Treatment Time: 50     Medicare Time Tracking (below)   Total Timed Codes (min):  15 1:1 Treatment Time:  15     TREATMENT AREA/ DIAGNOSIS = Low back pain [M54.5]    SUBJECTIVE  Pain Level (on 0 to 10 scale):  4  / 10   Medication Changes/New allergies or changes in medical history, any new surgeries or procedures?     NO    If yes, update Summary List   Subjective Functional Status/Changes:  []  No changes reported     See Eval      OBJECTIVE  Modalities Rationale:     decrease inflammation and decrease pain to improve patient's ability to perform ADLs without pain     min [] Estim, type/location:                                      []  att     []  unatt     []  w/US     []  w/ice    []  w/heat    min []  Mechanical Traction: type/lbs                   []  pro   []  sup   []  int   []  cont    []  before manual    []  after manual    min []  Ultrasound, settings/location:      min []  Iontophoresis w/ dexamethasone, location:                                               []  take home patch       []  in clinic   10 min [x]  Ice     []  Heat    location/position: L/S    min []  Vasopneumatic Device, press/temp:     min []  Other:    [] Skin assessment post-treatment (if applicable):    []  intact    []  redness- no adverse reaction     []redness  adverse reaction:        15 min Self Care: Education on pathology, body mechanics   Rationale:    education to improve the patients ability to self manage symptoms    Billed With/As:   [x] TE   [] TA   [] Neuro   [] Self Care Patient Education: [x] Review HEP    [] Progressed/Changed HEP based on:   [] positioning   [] body mechanics   [] transfers   [] heat/ice application    [] other: Other Objective/Functional Measures:    See Eval   Post Treatment Pain Level (on 0 to 10) scale:   4  / 10     ASSESSMENT  Assessment/Changes in Function:     See Eval     []  See Progress Note/Recertification   Patient will continue to benefit from skilled PT services to modify and progress therapeutic interventions, address functional mobility deficits, address ROM deficits, address strength deficits, analyze and address soft tissue restrictions, analyze and cue movement patterns, analyze and modify body mechanics/ergonomics and assess and modify postural abnormalities to attain remaining goals.    Progress toward goals / Updated goals:    See Eval     PLAN  [x]  Upgrade activities as tolerated YES Continue plan of care   []  Discharge due to :    []  Other:      Therapist: Penny Goodson DPT     Date: 4/19/2021 Time: 3:00 PM        Future Appointments   Date Time Provider Jim Pendleton   4/22/2021  9:15 AM Erroll Reid [facility]   4/28/2021 10:00 AM Erroll Reid [facility]   4/30/2021 11:15 AM Erroll Reid [facility]   5/3/2021 11:30 AM Erroll Reid [facility]   5/5/2021 11:30 AM Erroll Reid [facility]   5/10/2021 11:30 AM Erroll Reid [facility]   5/12/2021 11:30 AM Erroll Reid [facility]   5/17/2021 11:30 AM Erroll Reid [facility]   5/19/2021 11:30 AM Erroll Reid [facility]   5/24/2021 11:30 AM Erroll Reid [facility]

## 2021-04-22 ENCOUNTER — APPOINTMENT (OUTPATIENT)
Dept: PHYSICAL THERAPY | Age: 81
End: 2021-04-22
Payer: MEDICARE

## 2021-04-23 ENCOUNTER — HOSPITAL ENCOUNTER (OUTPATIENT)
Dept: PHYSICAL THERAPY | Age: 81
Discharge: HOME | End: 2021-04-23
Payer: MEDICARE

## 2021-04-23 PROCEDURE — 97110 THERAPEUTIC EXERCISES: CPT

## 2021-04-23 PROCEDURE — 97140 MANUAL THERAPY 1/> REGIONS: CPT

## 2021-04-23 NOTE — PROGRESS NOTES
PHYSICAL THERAPY - DAILY TREATMENT NOTE    Patient Name: Joana Shelton        Date: 2021  : 1940    Patient  Verified: YES  Visit #:      of   12  Insurance: Payor: Sivakumar Haver / Plan: [place] MEDICARE PART A & B / Product Type: Medicare /      In time: 1:30 Out time: 2:25   Total Treatment Time: 55     Medicare Time Tracking (below)   Total Timed Codes (min):  45 1:1 Treatment Time:  45     TREATMENT AREA/ DIAGNOSIS = Low back pain [M54.5]    SUBJECTIVE  Pain Level (on 0 to 10 scale):  4  / 10   Medication Changes/New allergies or changes in medical history, any new surgeries or procedures?     NO    If yes, update Summary List   Subjective Functional Status/Changes:  []  No changes reported     Pt reports pain with walking and with standing for prolonged periods of time      OBJECTIVE  Modalities Rationale:     decrease inflammation and decrease pain to improve patient's ability to perform ADLs without pain     min [] Estim, type/location:                                      []  att     []  unatt     []  w/US     []  w/ice    []  w/heat    min []  Mechanical Traction: type/lbs                   []  pro   []  sup   []  int   []  cont    []  before manual    []  after manual    min []  Ultrasound, settings/location:      min []  Iontophoresis w/ dexamethasone, location:                                               []  take home patch       []  in clinic   10 min [x]  Ice     []  Heat    location/position: L/S    min []  Vasopneumatic Device, press/temp:     min []  Other:    [] Skin assessment post-treatment (if applicable):    []  intact    []  redness- no adverse reaction     []redness  adverse reaction:        35 min Therapeutic Exercise:  [x]  See flow sheet   Rationale:      increase ROM and increase strength to improve the patients ability to perform ADLs without pain       10 min Manual Therapy: Trigger point release to L/S paraspinals   Rationale:      decrease pain, increase ROM and increase tissue extensibility to improve patient's ability to perform ADLs without pain  The manual therapy interventions were performed at a separate and distinct time from the therapeutic activities interventions      Billed With/As:   [x] TE   [] TA   [] Neuro   [] Self Care Patient Education: [x] Review HEP    [] Progressed/Changed HEP based on:   [] positioning   [] body mechanics   [] transfers   [] heat/ice application    [] other:        Other Objective/Functional Measures:    TTP to R paraspinals    Post Treatment Pain Level (on 0 to 10) scale:   3  / 10     ASSESSMENT  Assessment/Changes in Function:     Pt showing improved overall pain levels with flexion based exercises and improved walking ability after treatment session     []  See Progress Note/Recertification   Patient will continue to benefit from skilled PT services to modify and progress therapeutic interventions, address functional mobility deficits, address ROM deficits, address strength deficits, analyze and address soft tissue restrictions and analyze and cue movement patterns to attain remaining goals.    Progress toward goals / Updated goals:    Good Progress to    [] STG    [x] LTG  1 as shown by improved pain levels with walking after treatment session     PLAN  [x]  Upgrade activities as tolerated YES Continue plan of care   []  Discharge due to :    []  Other:      Therapist: Sherrill Cornell DPT     Date: 4/23/2021 Time: 3:22 PM        Future Appointments   Date Time Provider Jim Pendleton   4/28/2021 10:00 AM Robie Crook [facility]   4/30/2021 11:15 AM Robie Crook [facility]   5/3/2021 11:30 AM Robie Crook [facility]   5/5/2021 11:30 AM Robie Crook [facility]   5/10/2021 11:30 AM Robie Crook [facility]   5/12/2021 11:30 AM Robie Crook [facility]   5/17/2021 11:30 AM Robie Crook [facility]   5/19/2021 11:30 AM Sami Lozanook [facility]   5/24/2021 11:30 AM Sami Crook [facility]

## 2021-04-28 ENCOUNTER — HOSPITAL ENCOUNTER (OUTPATIENT)
Dept: PHYSICAL THERAPY | Age: 81
Discharge: HOME | End: 2021-04-28
Payer: MEDICARE

## 2021-04-28 PROCEDURE — 97110 THERAPEUTIC EXERCISES: CPT

## 2021-04-28 PROCEDURE — 97140 MANUAL THERAPY 1/> REGIONS: CPT

## 2021-04-28 NOTE — PROGRESS NOTES
PHYSICAL THERAPY - DAILY TREATMENT NOTE    Patient Name: Solange Caldera        Date: 2021  : 1940    Patient  Verified: YES  Visit #:   3   of   12  Insurance: Payor: Ana Rosa Angel / Plan: [place] MEDICARE PART A & B / Product Type: Medicare /      In time: 10:05 Out time: 11:00   Total Treatment Time: 55     Medicare Time Tracking (below)   Total Timed Codes (min):  45 1:1 Treatment Time:  40     TREATMENT AREA/ DIAGNOSIS = Low back pain [M54.5]    SUBJECTIVE  Pain Level (on 0 to 10 scale):  3  / 10   Medication Changes/New allergies or changes in medical history, any new surgeries or procedures? NO    If yes, update Summary List   Subjective Functional Status/Changes:  []  No changes reported     Pt reports able to stand for longer periods of time. Pt states her shoulder blade was hurting after last visit.  Pt still not able to stand for normal durations without onset of pain or discomfort       OBJECTIVE  Modalities Rationale:     decrease inflammation to improve patient's ability to perform ADLs without pain     min [] Estim, type/location:                                      []  att     []  unatt     []  w/US     []  w/ice    []  w/heat    min []  Mechanical Traction: type/lbs                   []  pro   []  sup   []  int   []  cont    []  before manual    []  after manual    min []  Ultrasound, settings/location:      min []  Iontophoresis w/ dexamethasone, location:                                               []  take home patch       []  in clinic   10 min [x]  Ice     []  Heat    location/position: L/S    min []  Vasopneumatic Device, press/temp:     min []  Other:    [] Skin assessment post-treatment (if applicable):    []  intact    []  redness- no adverse reaction     []redness  adverse reaction:        35 min Therapeutic Exercise:  [x]  See flow sheet   Rationale:      increase ROM and increase strength to improve the patients ability to perform ADLs without pain       10 min Manual Therapy: Trigger point release to L/S paraspinals    Rationale:      decrease pain, increase ROM and increase tissue extensibility to improve patient's ability to perform ADLs without pain  The manual therapy interventions were performed at a separate and distinct time from the therapeutic activities interventions      Billed With/As:   [x] TE   [] TA   [] Neuro   [] Self Care Patient Education: [x] Review HEP    [] Progressed/Changed HEP based on:   [] positioning   [] body mechanics   [] transfers   [] heat/ice application    [] other:        Other Objective/Functional Measures:    TTP to L paraspinals and L rhomboids   Post Treatment Pain Level (on 0 to 10) scale:   0  / 10     ASSESSMENT  Assessment/Changes in Function:     Pt showing improved activity tolerance and required less cueing for proper mechanics with therapeutic exercises     []  See Progress Note/Recertification   Patient will continue to benefit from skilled PT services to modify and progress therapeutic interventions, address functional mobility deficits, address ROM deficits, address strength deficits, analyze and address soft tissue restrictions and analyze and cue movement patterns to attain remaining goals.    Progress toward goals / Updated goals:    Good Progress to    [] STG    [x] LTG  1 as shown by improved pain levels in the back with ADLs     PLAN  [x]  Upgrade activities as tolerated YES Continue plan of care   []  Discharge due to :    []  Other:      Therapist: Annelise Griffin DPT     Date: 4/28/2021 Time: 9:41 AM        Future Appointments   Date Time Provider Jim Pendleton   4/28/2021 10:00 AM Johanne Billow [facility]   4/30/2021 11:15 AM Johanne Billow [facility]   5/3/2021 11:30 AM Johanne Billow [facility]   5/5/2021 11:30 AM Johanne Billow [facility]   5/10/2021 11:30 AM Johanne Billow [facility]   5/12/2021 11:30 AM Johanne Billow [facility]   5/17/2021 11:30 AM Na Billow [facility]   5/19/2021 11:30 AM Yeison Ayde [facility]   5/24/2021 11:30 AM Jose Sotelo [facility]

## 2021-04-30 ENCOUNTER — HOSPITAL ENCOUNTER (OUTPATIENT)
Dept: PHYSICAL THERAPY | Age: 81
Discharge: HOME | End: 2021-04-30
Payer: MEDICARE

## 2021-04-30 PROCEDURE — 97110 THERAPEUTIC EXERCISES: CPT

## 2021-04-30 NOTE — PROGRESS NOTES
PHYSICAL THERAPY - DAILY TREATMENT NOTE    Patient Name: Solange Caldera        Date: 2021  : 1940    Patient  Verified: YES  Visit #:     Insurance: Payor: Ana Rosa Angel / Plan: [place] MEDICARE PART A & B / Product Type: Medicare /      In time: 11:30 Out time: 12:05   Total Treatment Time: 35     Medicare Time Tracking (below)   Total Timed Codes (min):  30 1:1 Treatment Time:  30     TREATMENT AREA/ DIAGNOSIS = Low back pain [M54.5]    SUBJECTIVE  Pain Level (on 0 to 10 scale):  0  / 10   Medication Changes/New allergies or changes in medical history, any new surgeries or procedures? NO    If yes, update Summary List   Subjective Functional Status/Changes:  []  No changes reported     Pt reports no pain in the back. Pt states she has been able to walk and stand longer.        OBJECTIVE  Modalities Rationale:     decrease inflammation and decrease pain to improve patient's ability to perform ADLs without pain     min [] Estim, type/location:                                      []  att     []  unatt     []  w/US     []  w/ice    []  w/heat    min []  Mechanical Traction: type/lbs                   []  pro   []  sup   []  int   []  cont    []  before manual    []  after manual    min []  Ultrasound, settings/location:      min []  Iontophoresis w/ dexamethasone, location:                                               []  take home patch       []  in clinic   10 min [x]  Ice     []  Heat    location/position: L/S    min []  Vasopneumatic Device, press/temp:     min []  Other:    [] Skin assessment post-treatment (if applicable):    []  intact    []  redness- no adverse reaction     []redness  adverse reaction:        25 min Therapeutic Exercise:  [x]  See flow sheet   Rationale:      increase ROM and increase strength to improve the patients ability to perform ADLs without pain       5 min Manual Therapy: Trigger point release to L/S paraspinals    Rationale:      decrease pain, increase ROM and increase tissue extensibility to improve patient's ability to perform ADLs without pain  The manual therapy interventions were performed at a separate and distinct time from the therapeutic activities interventions    Billed With/As:   [x] TE   [] TA   [] Neuro   [] Self Care Patient Education: [x] Review HEP    [] Progressed/Changed HEP based on:   [] positioning   [] body mechanics   [] transfers   [] heat/ice application    [] other:        Other Objective/Functional Measures:    Decreased hip mobility   Post Treatment Pain Level (on 0 to 10) scale:   0  / 10     ASSESSMENT  Assessment/Changes in Function:     Pt showing imprvoed activity tolerance after flexion based exercises     []  See Progress Note/Recertification   Patient will continue to benefit from skilled PT services to modify and progress therapeutic interventions, address functional mobility deficits, address ROM deficits, address strength deficits, analyze and address soft tissue restrictions and analyze and cue movement patterns to attain remaining goals.    Progress toward goals / Updated goals:    Good Progress to    [] STG    [x] LTG  1 as shown by improved pain levels with ADLs     PLAN  [x]  Upgrade activities as tolerated YES Continue plan of care   []  Discharge due to :    []  Other:      Therapist: Dalila Fontenot DPT     Date: 4/30/2021 Time: 11:04 AM        Future Appointments   Date Time Provider Jim Pendleton   4/30/2021 11:15 AM Renne Morse [facility]   5/3/2021 11:30 AM Renne Morse [facility]   5/5/2021 11:30 AM Renne Morse [facility]   5/10/2021 11:30 AM Renne Morse [facility]   5/12/2021 11:30 AM Renne Morse [facility]   5/17/2021 11:30 AM Renne Morse [facility]   5/19/2021 11:30 AM Renne Morse [facility]   5/24/2021 11:30 AM [facility]

## 2021-05-03 ENCOUNTER — APPOINTMENT (OUTPATIENT)
Dept: PHYSICAL THERAPY | Age: 81
End: 2021-05-03
Payer: MEDICARE

## 2021-05-05 ENCOUNTER — HOSPITAL ENCOUNTER (OUTPATIENT)
Dept: PHYSICAL THERAPY | Age: 81
Discharge: HOME | End: 2021-05-05
Payer: MEDICARE

## 2021-05-05 PROCEDURE — 97110 THERAPEUTIC EXERCISES: CPT

## 2021-05-05 PROCEDURE — 97140 MANUAL THERAPY 1/> REGIONS: CPT

## 2021-05-05 NOTE — PROGRESS NOTES
PHYSICAL THERAPY - DAILY TREATMENT NOTE    Patient Name: Wally Martinez        Date: 2021  : 1940    Patient  Verified: YES  Visit #:     Insurance: Payor: Yusra Awad / Plan: [place] MEDICARE PART A & B / Product Type: Medicare /      In time: 11:35 Out time: 12:15   Total Treatment Time: 40     Medicare Time Tracking (below)   Total Timed Codes (min):  35 1:1 Treatment Time:  35     TREATMENT AREA/ DIAGNOSIS = Low back pain [M54.5]    SUBJECTIVE  Pain Level (on 0 to 10 scale):  0  / 10   Medication Changes/New allergies or changes in medical history, any new surgeries or procedures? NO    If yes, update Summary List   Subjective Functional Status/Changes:  []  No changes reported     Pt reports improved standing and walking ability.  Still not able to walk normal distances though      OBJECTIVE  Modalities Rationale:     decrease pain and increase tissue extensibility to improve patient's ability to perform ADLs without pain     min [] Estim, type/location:                                      []  att     []  unatt     []  w/US     []  w/ice    []  w/heat    min []  Mechanical Traction: type/lbs                   []  pro   []  sup   []  int   []  cont    []  before manual    []  after manual    min []  Ultrasound, settings/location:      min []  Iontophoresis w/ dexamethasone, location:                                               []  take home patch       []  in clinic   5 min [x]  Ice     []  Heat    location/position: L/S    min []  Vasopneumatic Device, press/temp:     min []  Other:    [] Skin assessment post-treatment (if applicable):    []  intact    []  redness- no adverse reaction     []redness  adverse reaction:        20 min Therapeutic Exercise:  [x]  See flow sheet   Rationale:      increase ROM and increase strength to improve the patients ability to perform ADLs without pain       10 min Manual Therapy: PROM to B hips   Rationale:      increase ROM and increase tissue extensibility to improve patient's ability to perform ADLs without pain  The manual therapy interventions were performed at a separate and distinct time from the therapeutic activities interventions    Billed With/As:   [x] TE   [] TA   [] Neuro   [] Self Care Patient Education: [x] Review HEP    [] Progressed/Changed HEP based on:   [] positioning   [] body mechanics   [] transfers   [] heat/ice application    [] other:        Other Objective/Functional Measures:    Improved pain with flexion based program   Post Treatment Pain Level (on 0 to 10) scale:   0  / 10     ASSESSMENT  Assessment/Changes in Function:     Pt showing improved overall activity tolerance. Pt able to stand for improved periods     []  See Progress Note/Recertification   Patient will continue to benefit from skilled PT services to modify and progress therapeutic interventions, address functional mobility deficits, address ROM deficits, address strength deficits and analyze and address soft tissue restrictions to attain remaining goals.    Progress toward goals / Updated goals:    Good Progress to    [] STG    [x] LTG  1 as shown by decreased overall pain levels with ADLs     PLAN  [x]  Upgrade activities as tolerated YES Continue plan of care   []  Discharge due to :    []  Other:      Therapist: Gerald Banks DPT     Date: 5/5/2021 Time: 11:46 AM        Future Appointments   Date Time Provider Jim Pendleton   5/10/2021 11:30 AM Chalice Second [facility]   5/12/2021 11:30 AM Chalice Second [facility]   5/17/2021 11:30 AM Chalice Second [facility]   5[facility]19/2021 11:30 AM Chalice Second [facility]   5/24/2021 11:30 AM Chalice Second [facility]

## 2021-05-10 ENCOUNTER — HOSPITAL ENCOUNTER (OUTPATIENT)
Dept: PHYSICAL THERAPY | Age: 81
Discharge: HOME | End: 2021-05-10
Payer: MEDICARE

## 2021-05-10 PROCEDURE — 97140 MANUAL THERAPY 1/> REGIONS: CPT

## 2021-05-10 PROCEDURE — 97110 THERAPEUTIC EXERCISES: CPT

## 2021-05-10 NOTE — PROGRESS NOTES
PHYSICAL THERAPY - DAILY TREATMENT NOTE    Patient Name: Hilda Davis        Date: 5/10/2021  : 1940    Patient  Verified: YES  Visit #:     Insurance: Payor: Ilana Blair / Plan: [place] MED[place] & B / Product Type: Medicare /      In time: 11:35 Out time: 12:25   Total Treatment Time: 50     Medicare Time Tracking (below)   Total Timed Codes (min):  40 1:1 Treatment Time:  40     TREATMENT AREA/ DIAGNOSIS = Low back pain [M54.5]    SUBJECTIVE  Pain Level (on 0 to 10 scale):  0  / 10   Medication Changes/New allergies or changes in medical history, any new surgeries or procedures? NO    If yes, update Summary List   Subjective Functional Status/Changes:  []  No changes reported     Pt reports she is feeling much better.  Only has pain with prolonged walking and standing       OBJECTIVE  Modalities Rationale:     decrease inflammation and decrease pain to improve patient's ability to perform ADLs without pain   min [] Estim, type/location:                                      []  att     []  unatt     []  w/US     []  w/ice    []  w/heat    min []  Mechanical Traction: type/lbs                   []  pro   []  sup   []  int   []  cont    []  before manual    []  after manual    min []  Ultrasound, settings/location:      min []  Iontophoresis w/ dexamethasone, location:                                               []  take home patch       []  in clinic   10 min [x]  Ice     []  Heat    location/position: L/S    min []  Vasopneumatic Device, press/temp:     min []  Other:    [] Skin assessment post-treatment (if applicable):    []  intact    []  redness- no adverse reaction     []redness  adverse reaction:        30 min Therapeutic Exercise:  [x]  See flow sheet   Rationale:      increase ROM and increase strength to improve the patients ability to perform ADLs without pain       10 min Manual Therapy: PROM to B hips   Rationale:      decrease pain, increase ROM and increase tissue extensibility to improve patient's ability to perform ADLs without pain  The manual therapy interventions were performed at a separate and distinct time from the therapeutic activities interventions    Billed With/As:   [x] TE   [] TA   [] Neuro   [] Self Care Patient Education: [x] Review HEP    [] Progressed/Changed HEP based on:   [] positioning   [] body mechanics   [] transfers   [] heat/ice application    [] other:        Other Objective/Functional Measures:    Less TTP to L/S   Post Treatment Pain Level (on 0 to 10) scale:   0  / 10     ASSESSMENT  Assessment/Changes in Function:     Pt showing improved overall activity tolerance to flexion based exercises. Pt also showing improved standing tolerance      []  See Progress Note/Recertification   Patient will continue to benefit from skilled PT services to modify and progress therapeutic interventions, address functional mobility deficits, address ROM deficits, address strength deficits, analyze and address soft tissue restrictions and analyze and cue movement patterns to attain remaining goals.    Progress toward goals / Updated goals:    Good Progress to    [] STG    [x] LTG  1 as shown by improved overall pain levels with since onset of care     PLAN  [x]  Upgrade activities as tolerated YES Continue plan of care   []  Discharge due to :    []  Other:      Therapist: Lupe Murry DPT     Date: 5/10/2021 Time: 9:53 AM        Future Appointments   Date Time Provider Jim Pendleton   5/10/2021 11:30 AM Francyne Prow [facility]   5/12/2021 11:30 AM Francyne Prow [facility]   5/17/2021 11:30 AM Francyne Prow [facility]   5/19/2021 11:30 AM Francyne Prow [facility]   5/24/2021 11:30 AM Francyne Prow [facility]

## 2021-05-12 ENCOUNTER — HOSPITAL ENCOUNTER (OUTPATIENT)
Dept: PHYSICAL THERAPY | Age: 81
Discharge: HOME | End: 2021-05-12
Payer: MEDICARE

## 2021-05-12 PROCEDURE — 97110 THERAPEUTIC EXERCISES: CPT

## 2021-05-12 PROCEDURE — 97140 MANUAL THERAPY 1/> REGIONS: CPT

## 2021-05-12 NOTE — PROGRESS NOTES
PHYSICAL THERAPY - DAILY TREATMENT NOTE    Patient Name: Merlyn Rivera        Date: 2021  : 1940    Patient  Verified: YES  Visit #:     Insurance: Payor: Samantha Martinez / Plan: [place] MEDICARE PART A & B / Product Type: Medicare /      In time: 11:30 Out time: 12:25   Total Treatment Time: 55     Medicare Time Tracking (below)   Total Timed Codes (min):  45 1:1 Treatment Time:  45     TREATMENT AREA/ DIAGNOSIS = Low back pain [M54.5]    SUBJECTIVE  Pain Level (on 0 to 10 scale):  0  / 10   Medication Changes/New allergies or changes in medical history, any new surgeries or procedures? NO    If yes, update Summary List   Subjective Functional Status/Changes:  []  No changes reported     Pt reports less pain with walking and standing today.       OBJECTIVE  Modalities Rationale:     decrease inflammation and decrease pain to improve patient's ability to perform ADLs without pain   min [] Estim, type/location:                                      []  att     []  unatt     []  w/US     []  w/ice    []  w/heat    min []  Mechanical Traction: type/lbs                   []  pro   []  sup   []  int   []  cont    []  before manual    []  after manual    min []  Ultrasound, settings/location:      min []  Iontophoresis w/ dexamethasone, location:                                               []  take home patch       []  in clinic   10 min [x]  Ice     []  Heat    location/position: L/S    min []  Vasopneumatic Device, press/temp:     min []  Other:    [] Skin assessment post-treatment (if applicable):    []  intact    []  redness- no adverse reaction     []redness  adverse reaction:        35 min Therapeutic Exercise:  [x]  See flow sheet   Rationale:      increase ROM and increase strength to improve the patients ability to perform ADLs without pain       10 min Manual Therapy: PROM to B hips   Rationale:      decrease pain, increase ROM and increase tissue extensibility to improve patient's ability to perform ADLs without pain  The manual therapy interventions were performed at a separate and distinct time from the therapeutic activities interventions    Billed With/As:   [x] TE   [] TA   [] Neuro   [] Self Care Patient Education: [x] Review HEP    [] Progressed/Changed HEP based on:   [] positioning   [] body mechanics   [] transfers   [] heat/ice application    [] other:        Other Objective/Functional Measures:  Initiated sitting swiss ball rolls for lumbar flexion stretch   Post Treatment Pain Level (on 0 to 10) scale:   0  / 10     ASSESSMENT  Assessment/Changes in Function:     Pt showing improved activity tolerance since onset of care     []  See Progress Note/Recertification   Patient will continue to benefit from skilled PT services to modify and progress therapeutic interventions, address functional mobility deficits, address ROM deficits, address strength deficits and analyze and address soft tissue restrictions to attain remaining goals.    Progress toward goals / Updated goals:    Good Progress to    [] STG    [x] LTG  1 as shown by improved pain levels with ADLs     PLAN  [x]  Upgrade activities as tolerated YES Continue plan of care   []  Discharge due to :    []  Other:      Therapist: Kelechi Martinez DPT     Date: 5/12/2021 Time: 11:55 AM        Future Appointments   Date Time Provider Jim Pendleton   5/17/2021 11:30 AM Charis Kern [facility]   5/19/2021 11:30 AM Charis Kern [facility]   5/24/2021 11:30 AM Charis Kern [facility]

## 2021-05-17 ENCOUNTER — HOSPITAL ENCOUNTER (OUTPATIENT)
Dept: PHYSICAL THERAPY | Age: 81
Discharge: HOME | End: 2021-05-17
Payer: MEDICARE

## 2021-05-17 PROCEDURE — 97110 THERAPEUTIC EXERCISES: CPT

## 2021-05-17 NOTE — PROGRESS NOTES
12: PHYSICAL THERAPY - DAILY TREATMENT NOTE    Patient Name: Toby Clemente        Date: 2021  : 1940    Patient  Verified: YES  Visit #:     Insurance: Payor: Leticia Cantu / Plan: [place] MEDICARE PART A & B / Product Type: Medicare /      In time: 11:35 Out time: 12:25   Total Treatment Time: 50     Medicare Time Tracking (below)   Total Timed Codes (min):  40 1:1 Treatment Time:  40     TREATMENT AREA/ DIAGNOSIS = Low back pain [M54.5]    SUBJECTIVE  Pain Level (on 0 to 10 scale):  0  / 10   Medication Changes/New allergies or changes in medical history, any new surgeries or procedures?     NO    If yes, update Summary List   Subjective Functional Status/Changes:  []  No changes reported     Pt reports still having pain with prolonged standing and walking      OBJECTIVE  Modalities Rationale:     decrease inflammation and decrease pain to improve patient's ability to perform ADLs without pain     min [] Estim, type/location:                                      []  att     []  unatt     []  w/US     []  w/ice    []  w/heat    min []  Mechanical Traction: type/lbs                   []  pro   []  sup   []  int   []  cont    []  before manual    []  after manual    min []  Ultrasound, settings/location:      min []  Iontophoresis w/ dexamethasone, location:                                               []  take home patch       []  in clinic    min []  Ice     []  Heat    location/position:     min []  Vasopneumatic Device, press/temp:     min []  Other:    [] Skin assessment post-treatment (if applicable):    []  intact    []  redness- no adverse reaction     []redness  adverse reaction:        40 min Therapeutic Exercise:  [x]  See flow sheet   Rationale:      increase ROM and increase strength to improve the patients ability to perform ADLs without pain     Billed With/As:   [x] TE   [] TA   [] Neuro   [] Self Care Patient Education: [x] Review HEP    [] Progressed/Changed HEP based on:   [] positioning   [] body mechanics   [] transfers   [] heat/ice application    [] other:        Other Objective/Functional Measures:    Decreased pain with standing after exercises. Post Treatment Pain Level (on 0 to 10) scale:   0  / 10     ASSESSMENT  Assessment/Changes in Function:     Pt showing overall improvement in activity tolerance      []  See Progress Note/Recertification   Patient will continue to benefit from skilled PT services to modify and progress therapeutic interventions, address functional mobility deficits, address ROM deficits, address strength deficits, analyze and address soft tissue restrictions and analyze and cue movement patterns to attain remaining goals.    Progress toward goals / Updated goals:    Good Progress to    [] STG    [x] LTG  1 as shown by improved overall symptoms with ADLs     PLAN  [x]  Upgrade activities as tolerated YES Continue plan of care   []  Discharge due to :    []  Other:      Therapist: Wil Damico DPT     Date: 5/17/2021 Time: 11:59 AM        Future Appointments   Date Time Provider Jim Pendleton   5/19/2021 11:30 AM Bohdan Rily [facility]   5/24/2021 11:30 AM Bohdan Rily [facility]

## 2021-05-19 ENCOUNTER — HOSPITAL ENCOUNTER (OUTPATIENT)
Dept: PHYSICAL THERAPY | Age: 81
Discharge: HOME | End: 2021-05-19
Payer: MEDICARE

## 2021-05-19 PROCEDURE — 97110 THERAPEUTIC EXERCISES: CPT

## 2021-05-19 PROCEDURE — 97140 MANUAL THERAPY 1/> REGIONS: CPT

## 2021-05-19 NOTE — PROGRESS NOTES
PHYSICAL THERAPY - DAILY TREATMENT NOTE    Patient Name: Joana Shelton        Date: 2021  : 1940    Patient  Verified: YES  Visit #:     Insurance: Payor: Sivakumar Haver / Plan: [place] MEDICARE PART A & B / Product Type: Medicare /      In time: 11:45 Out time: 12:25   Total Treatment Time: 40     Medicare Time Tracking (below)   Total Timed Codes (min):  30 1:1 Treatment Time:  30     TREATMENT AREA/ DIAGNOSIS = Low back pain [M54.5]    SUBJECTIVE  Pain Level (on 0 to 10 scale):  0  / 10   Medication Changes/New allergies or changes in medical history, any new surgeries or procedures? NO    If yes, update Summary List   Subjective Functional Status/Changes:  []  No changes reported     See Recert      OBJECTIVE  Modalities Rationale:     decrease inflammation and decrease pain to improve patient's ability to perform ADLs without pain     min [] Estim, type/location:                                      []  att     []  unatt     []  w/US     []  w/ice    []  w/heat    min []  Mechanical Traction: type/lbs                   []  pro   []  sup   []  int   []  cont    []  before manual    []  after manual    min []  Ultrasound, settings/location:      min []  Iontophoresis w/ dexamethasone, location:                                               []  take home patch       []  in clinic   10 min [x]  Ice     []  Heat    location/position: L/S    min []  Vasopneumatic Device, press/temp:     min []  Other:    [] Skin assessment post-treatment (if applicable):    []  intact    []  redness- no adverse reaction     []redness  adverse reaction:        20 min Therapeutic Exercise:  [x]  See flow sheet   Rationale:      increase ROM and increase strength to improve the patients ability to perform ADLs without pain       10 min Manual Therapy: Trigger point to L/S.  PROM to B hips   Rationale:      increase ROM and increase tissue extensibility to improve patient's ability to perform ADLs without pain    The manual therapy interventions were performed at a separate and distinct time from the therapeutic activities interventions      Billed With/As:   [x] TE   [] TA   [] Neuro   [] Self Care Patient Education: [x] Review HEP    [] Progressed/Changed HEP based on:   [] positioning   [] body mechanics   [] transfers   [] heat/ice application    [] other:        Other Objective/Functional Measures:    See Recert   Post Treatment Pain Level (on 0 to 10) scale:   0  / 10     ASSESSMENT  Assessment/Changes in Function:     See Recert     []  See Progress Note/Recertification   Patient will continue to benefit from skilled PT services to modify and progress therapeutic interventions, address functional mobility deficits, address ROM deficits, address strength deficits and analyze and address soft tissue restrictions to attain remaining goals.    Progress toward goals / Updated goals:    See Recert     PLAN  [x]  Upgrade activities as tolerated YES Continue plan of care   []  Discharge due to :    []  Other:      Therapist: Dalila Fontenot DPT     Date: 5/19/2021 Time: 9:44 AM        Future Appointments   Date Time Provider Jim Pendleton   5/19/2021 11:30 AM Renne Morse [facility]   5/24/2021 11:30 AM Renne Morse [facility]   6/2/2021 11:30 AM Renne Morse [facility]   6/7/2021 11:30 AM Renne Morse [facility]   6/9/2021 11:30 AM Renne Morse [facility]   6/14/2021 11:30 AM Renne Morse [facility]   6/16/2021 11:30 AM Renne Morse [facility]

## 2021-05-19 NOTE — PROGRESS NOTES
[address] [facility] PHYSICAL THERAPY AT [facility] [address] [place]  Phone: [phone]  Fax: [phone] OF [facility]          Patient Name: Rosie Valiente : 1940   Treatment/Medical Diagnosis: Low back pain [M54.5]   Onset Date: 2020    Referral Source: Rosario Brooks MD Start of Care [facility]): 2021   Prior Hospitalization: See Medical History Provider #: [id number]   Prior Level of Function: Pt was pain free with ADLs   Comorbidities: Pt reports high blood pressure and thyroid problems    Medications: Verified on Patient Summary List   Visits from [facility]: 9 Missed Visits: -     Goal/Measure of Progress Goal Met? 1. Increase score on FOTO to > or = 60 to demo an increase in functional activity tolerance   Status at last Eval: 42 Current Status: 54 progressing   2. Pt will note < or = 2/10 pain with all mobility to improve comfort with ADLs. Status at last Eval: 4 Current Status: 0-4 progressing   3. Pt will demonstrate a GROC score of >/= +5 to show overall improvement in function   Status at last Eval: na Current Status: +2 progressing     Key Functional Changes/Progress: Pt reports having improved overall pain levels. Pt states she is no longer having pain with short duration standing and walking. Pt still reports pain with prolonged standing.  Pt responding well to flexion based exercises  Problem List: pain affecting function, decrease ROM, decrease strength, impaired gait/ balance and decrease ADL/ functional abilitiies   Treatment Plan may include any combination of the following: Therapeutic exercise, Therapeutic activities, Neuromuscular re-education, Physical agent/modality, Gait/balance training, Manual therapy, Patient education and Self Care training  Patient Goal(s) has been updated and includes:      Goals for this certification period include and are to be achieved in   4 weeks:  Continue goals above  Frequency / Duration:   Patient to be seen   2   times per week for   4    weeks:    Assessments/Recommendations: Pt to continue PT 2x a weeks for 4 weeks to improved standing tolerance and walking tolerance needed for ADLs  If you have any questions/comments please contact us directly at [phone]. Thank you for allowing us to assist in the care of your patient. Therapist Signature: Jesisca Ruiz Date: 9/40/2201   Certification Period:  Reporting Period: 5/19/2021-8/19/2021 4/19/2021-5/19/2021 Time: 9:44 AM   NOTE TO PHYSICIAN:  PLEASE COMPLETE THE ORDERS BELOW AND FAX TO   [facility] at [place]: [phone]. If you are unable to process this request in 24 hours please contact our office: [phone].    ___ I have read the above report and request that my patient continue as recommended.   ___ I have read the above report and request that my patient continue therapy with the following changes/special instructions: ________________________________________________   ___ I have read the above report and request that my patient be discharged from therapy.      Physician Signature:        Date:       Time:    Ramon Castro MD.

## 2021-05-24 ENCOUNTER — HOSPITAL ENCOUNTER (OUTPATIENT)
Dept: PHYSICAL THERAPY | Age: 81
Discharge: HOME | End: 2021-05-24
Payer: MEDICARE

## 2021-05-24 PROCEDURE — 97110 THERAPEUTIC EXERCISES: CPT

## 2021-05-24 NOTE — PROGRESS NOTES
PHYSICAL THERAPY - DAILY TREATMENT NOTE    Patient Name: Solange Caldera        Date: 2021  : 1940    Patient  Verified: YES  Visit #:   10   of   12  Insurance: Payor: Ana Rosa Angel / Plan: [place] MEDICARE PART A & B / Product Type: Medicare /      In time: 11:35 Out time: 12:30   Total Treatment Time: 55     Medicare Time Tracking (below)   Total Timed Codes (min):  45 1:1 Treatment Time:  40     TREATMENT AREA/ DIAGNOSIS = Low back pain [M54.5]    SUBJECTIVE  Pain Level (on 0 to 10 scale):  0-1  / 10   Medication Changes/New allergies or changes in medical history, any new surgeries or procedures? NO    If yes, update Summary List   Subjective Functional Status/Changes:  []  No changes reported     No pain currently. Still has pain with prolonged standing though.  Pt reports performing the exercises over the weekend and it felt good       OBJECTIVE  Modalities Rationale:     decrease inflammation and decrease pain to improve patient's ability to perform ADLs without pain   min [] Estim, type/location:                                      []  att     []  unatt     []  w/US     []  w/ice    []  w/heat    min []  Mechanical Traction: type/lbs                   []  pro   []  sup   []  int   []  cont    []  before manual    []  after manual    min []  Ultrasound, settings/location:      min []  Iontophoresis w/ dexamethasone, location:                                               []  take home patch       []  in clinic   10 min [x]  Ice     []  Heat    location/position: L/S    min []  Vasopneumatic Device, press/temp:     min []  Other:    [] Skin assessment post-treatment (if applicable):    []  intact    []  redness- no adverse reaction     []redness  adverse reaction:        45/40 min Therapeutic Exercise:  [x]  See flow sheet   Rationale:      increase ROM, increase strength and improve coordination to improve the patients ability to perform ADLs without pain       Billed With/As:   [x] TE   [] TA   [] Neuro   [] Self Care Patient Education: [x] Review HEP    [] Progressed/Changed HEP based on:   [] positioning   [] body mechanics   [] transfers   [] heat/ice application    [] other:        Other Objective/Functional Measures:    Improved overall pain levels. Improved standing tolerance    Post Treatment Pain Level (on 0 to 10) scale:   0  / 10     ASSESSMENT  Assessment/Changes in Function:     Pt showing improved overall symptoms with ADLs after using flexion based exercise program     []  See Progress Note/Recertification   Patient will continue to benefit from skilled PT services to modify and progress therapeutic interventions, address functional mobility deficits, address ROM deficits, address strength deficits and analyze and address soft tissue restrictions to attain remaining goals.    Progress toward goals / Updated goals:    Good Progress to    [] STG    [x] LTG  1 as shown by improved tolerance to exercises     PLAN  [x]  Upgrade activities as tolerated YES Continue plan of care   []  Discharge due to :    []  Other:      Therapist: Gerald Banks DPT     Date: 5/24/2021 Time: 9:03 AM        Future Appointments   Date Time Provider Jim Pendleton   5/24/2021 11:30 AM Chalice Second [facility]   6/2/2021 11:30 AM Chalice Second [facility]   6/3/2021  2:15 PM Chalice Second [facility]   6/7/2021 11:30 AM Chalice Second [facility]   6/9/2021 11:30 AM Chalice Second [facility]   6/14/2021 11:30 AM Chalice Second [facility]   6/16/2021 11:30 AM Chalice Second [facility]

## 2021-06-02 ENCOUNTER — HOSPITAL ENCOUNTER (OUTPATIENT)
Dept: PHYSICAL THERAPY | Age: 81
Discharge: HOME | End: 2021-06-02
Payer: MEDICARE

## 2021-06-02 PROCEDURE — 97140 MANUAL THERAPY 1/> REGIONS: CPT

## 2021-06-02 PROCEDURE — 97110 THERAPEUTIC EXERCISES: CPT

## 2021-06-02 NOTE — PROGRESS NOTES
PHYSICAL THERAPY - DAILY TREATMENT NOTE    Patient Name: Santosh Gaona        Date: 2021  : 1940    Patient  Verified: YES  Visit #:      16  Insurance: Payor: Erica Chapa / Plan: [place] & B / Product Type: Medicare /      In time: 11:35 Out time: 12:25   Total Treatment Time: 50     Medicare Time Tracking (below)   Total Timed Codes (min):  40 1:1 Treatment Time:  40     TREATMENT AREA/ DIAGNOSIS = Low back pain [M54.5]    SUBJECTIVE  Pain Level (on 0 to 10 scale):  0  / 10   Medication Changes/New allergies or changes in medical history, any new surgeries or procedures? NO    If yes, update Summary List   Subjective Functional Status/Changes:  []  No changes reported     Pt reports pain in her back from doing a lot over the weekend. Pt states she was standing too long and felt pain.  Pt states pain in the R side of the low back      OBJECTIVE  Modalities Rationale:     decrease inflammation and decrease pain to improve patient's ability to perform ADLs without pain     min [] Estim, type/location:                                      []  att     []  unatt     []  w/US     []  w/ice    []  w/heat    min []  Mechanical Traction: type/lbs                   []  pro   []  sup   []  int   []  cont    []  before manual    []  after manual    min []  Ultrasound, settings/location:      min []  Iontophoresis w/ dexamethasone, location:                                               []  take home patch       []  in clinic   10 min [x]  Ice     []  Heat    location/position: L/S    min []  Vasopneumatic Device, press/temp:     min []  Other:    [] Skin assessment post-treatment (if applicable):    []  intact    []  redness- no adverse reaction     []redness  adverse reaction:        30 min Therapeutic Exercise:  [x]  See flow sheet   Rationale:      increase ROM and increase strength to improve the patients ability to perform ADLs without pain       10 min Manual Therapy: Trigger point release to L/S paraspinals. MET for R ant innominate   Rationale:      increase ROM and increase tissue extensibility to improve patient's ability to perform ADLs without pain  The manual therapy interventions were performed at a separate and distinct time from the therapeutic activities interventions      Billed With/As:   [x] TE   [] TA   [] Neuro   [] Self Care Patient Education: [x] Review HEP    [] Progressed/Changed HEP based on:   [] positioning   [] body mechanics   [] transfers   [] heat/ice application    [] other:        Other Objective/Functional Measures:    r ant innominate   Post Treatment Pain Level (on 0 to 10) scale:   0  / 10     ASSESSMENT  Assessment/Changes in Function:     Improve pain after MET. Pt had decreased pain also after flexion based exercise program      []  See Progress Note/Recertification   Patient will continue to benefit from skilled PT services to modify and progress therapeutic interventions, address functional mobility deficits, address ROM deficits, address strength deficits and analyze and address soft tissue restrictions to attain remaining goals.    Progress toward goals / Updated goals:    Good Progress to    [] STG    [x] LTG  1 as shown by improved overall pain levels with ADLs     PLAN  [x]  Upgrade activities as tolerated YES Continue plan of care   []  Discharge due to :    []  Other:      Therapist: Sanjeev Winter DPT     Date: 6/2/2021 Time: 2:34 PM        Future Appointments   Date Time Provider Jim Pendleton   6/3/2021  2:15 PM Jeane Dove [facility]   6/7/2021 11:30 AM Jeane Dove [facility]   6/9/2021 11:30 AM Jeane Dove [facility]   6/14/2021 11:30 AM Jeane Dove [facility]   6/16/2021 10:00 AM Jeane Dove [facility]   6/22/2021 10:00 AM Jeane Dove [facility]   6/30/2021 11:30 AM Jeane Dove [facility]

## 2021-06-03 ENCOUNTER — HOSPITAL ENCOUNTER (OUTPATIENT)
Dept: PHYSICAL THERAPY | Age: 81
Discharge: HOME | End: 2021-06-03
Payer: MEDICARE

## 2021-06-03 PROCEDURE — 97110 THERAPEUTIC EXERCISES: CPT

## 2021-06-07 ENCOUNTER — HOSPITAL ENCOUNTER (OUTPATIENT)
Dept: PHYSICAL THERAPY | Age: 81
Discharge: HOME | End: 2021-06-07
Payer: MEDICARE

## 2021-06-07 PROCEDURE — 97140 MANUAL THERAPY 1/> REGIONS: CPT

## 2021-06-07 PROCEDURE — 97110 THERAPEUTIC EXERCISES: CPT

## 2021-06-07 NOTE — PROGRESS NOTES
PHYSICAL THERAPY - DAILY TREATMENT NOTE    Patient Name: Ericka Swift        Date: 2021  : 1940    Patient  Verified: YES  Visit #:   15   of   16  Insurance: Payor: Yanna No / Plan: [place] MEDICARE PART A & B / Product Type: Medicare /      In time: 11:30 Out time: 12:20   Total Treatment Time: 50     Medicare Time Tracking (below)   Total Timed Codes (min):  40 1:1 Treatment Time:  40     TREATMENT AREA/ DIAGNOSIS = Low back pain [M54.5]    SUBJECTIVE  Pain Level (on 0 to 10 scale):  1  / 10   Medication Changes/New allergies or changes in medical history, any new surgeries or procedures? NO    If yes, update Summary List   Subjective Functional Status/Changes:  []  No changes reported     Pt reports pain with standing for prolonged periods      OBJECTIVE  Modalities Rationale:     decrease inflammation and decrease pain to improve patient's ability to perform ADLs without pain     min [] Estim, type/location:                                      []  att     []  unatt     []  w/US     []  w/ice    []  w/heat    min []  Mechanical Traction: type/lbs                   []  pro   []  sup   []  int   []  cont    []  before manual    []  after manual    min []  Ultrasound, settings/location:      min []  Iontophoresis w/ dexamethasone, location:                                               []  take home patch       []  in clinic   10 min [x]  Ice     []  Heat    location/position: L/S    min []  Vasopneumatic Device, press/temp:     min []  Other:    [] Skin assessment post-treatment (if applicable):    []  intact    []  redness- no adverse reaction     []redness  adverse reaction:        30 min Therapeutic Exercise:  [x]  See flow sheet   Rationale:      increase ROM and increase strength to improve the patients ability to perform ADLs without pain       10 min Manual Therapy: PROM to B hips.  Trigger point release to L/S paraspinals   Rationale:      increase ROM and increase tissue extensibility to improve patient's ability to perform ADLs without pain  The manual therapy interventions were performed at a separate and distinct time from the therapeutic activities interventions    Billed With/As:   [x] TE   [] TA   [] Neuro   [] Self Care Patient Education: [x] Review HEP    [] Progressed/Changed HEP based on:   [] positioning   [] body mechanics   [] transfers   [] heat/ice application    [] other:        Other Objective/Functional Measures:    TTP to paraspinals. Post Treatment Pain Level (on 0 to 10) scale:   0  / 10     ASSESSMENT  Assessment/Changes in Function:     Pt showing improved activity tolerance. Flexion based exercises still decreasing pain levels      []  See Progress Note/Recertification   Patient will continue to benefit from skilled PT services to modify and progress therapeutic interventions, address functional mobility deficits, address ROM deficits, address strength deficits and analyze and address soft tissue restrictions to attain remaining goals.    Progress toward goals / Updated goals:    Good Progress to    [] STG    [x] LTG  1 as shown by improved overall pain levels since onset of care     PLAN  [x]  Upgrade activities as tolerated YES Continue plan of care   []  Discharge due to :    []  Other:      Therapist: Sherrill Cornell DPT     Date: 6/7/2021 Time: 9:53 AM        Future Appointments   Date Time Provider Jim Pendleton   6/7/2021 11:30 AM Robie Crook [facility]   6/9/2021 11:30 AM Robie Crook [facility]   6/14/2021 11:30 AM Robie Crook [facility]   6/16/2021 10:00 AM Robie Crook [facility]   6/22/2021 10:00 AM Robie Crook [facility]   6/30/2021 11:30 AM Robie Crook [facility]

## 2021-06-09 ENCOUNTER — HOSPITAL ENCOUNTER (OUTPATIENT)
Dept: PHYSICAL THERAPY | Age: 81
Discharge: HOME | End: 2021-06-09
Payer: MEDICARE

## 2021-06-09 PROCEDURE — 97140 MANUAL THERAPY 1/> REGIONS: CPT

## 2021-06-09 PROCEDURE — 97110 THERAPEUTIC EXERCISES: CPT

## 2021-06-09 NOTE — PROGRESS NOTES
PHYSICAL THERAPY - DAILY TREATMENT NOTE    Patient Name: Stefany Slater        Date: 2021  : 1940    Patient  Verified: YES  Visit #:   15   of   16  Insurance: Payor: Maddi Hind / Plan: [place] MEDICARE PART A & B / Product Type: Medicare /      In time: 11:30 Out time: 12:25   Total Treatment Time: 55     Medicare Time Tracking (below)   Total Timed Codes (min):  45 1:1 Treatment Time:  30     TREATMENT AREA/ DIAGNOSIS = Low back pain [M54.5]    SUBJECTIVE  Pain Level (on 0 to 10 scale):  1  / 10   Medication Changes/New allergies or changes in medical history, any new surgeries or procedures?     NO    If yes, update Summary List   Subjective Functional Status/Changes:  []  No changes reported     Pt reports slight pain still after prolonged standing ADLs      OBJECTIVE  Modalities Rationale:     decrease inflammation and decrease pain to improve patient's ability to perform ADLs without pain     min [] Estim, type/location:                                      []  att     []  unatt     []  w/US     []  w/ice    []  w/heat    min []  Mechanical Traction: type/lbs                   []  pro   []  sup   []  int   []  cont    []  before manual    []  after manual    min []  Ultrasound, settings/location:      min []  Iontophoresis w/ dexamethasone, location:                                               []  take home patch       []  in clinic   10 min [x]  Ice     []  Heat    location/position: L/S    min []  Vasopneumatic Device, press/temp:     min []  Other:    [] Skin assessment post-treatment (if applicable):    []  intact    []  redness- no adverse reaction     []redness  adverse reaction:        35 min Therapeutic Exercise:  [x]  See flow sheet   Rationale:      increase ROM, increase strength and improve coordination to improve the patients ability to perform ADLs without pain       10 min Manual Therapy: PROM to B hips   Rationale:      decrease pain, increase ROM and increase tissue extensibility to improve patient's ability to perform ADLs without pain  The manual therapy interventions were performed at a separate and distinct time from the therapeutic activities interventions    Billed With/As:   [x] TE   [] TA   [] Neuro   [] Self Care Patient Education: [x] Review HEP    [] Progressed/Changed HEP based on:   [] positioning   [] body mechanics   [] transfers   [] heat/ice application    [] other:        Other Objective/Functional Measures:    Good hip mobility    Post Treatment Pain Level (on 0 to 10) scale:   0  / 10     ASSESSMENT  Assessment/Changes in Function:     Pt showing improve activity tolerance. Pt showing improve exercise tolerance     []  See Progress Note/Recertification   Patient will continue to benefit from skilled PT services to modify and progress therapeutic interventions, address functional mobility deficits, address ROM deficits, address strength deficits and analyze and address soft tissue restrictions to attain remaining goals.    Progress toward goals / Updated goals:    Good Progress to    [] STG    [] LTG  1 as shown by improved pain levels with ADLs     PLAN  [x]  Upgrade activities as tolerated YES Continue plan of care   []  Discharge due to :    []  Other:      Therapist: Erin Rios DPT     Date: 6/9/2021 Time: 2:38 PM        Future Appointments   Date Time Provider Jim Pendleton   6/14/2021 11:30 AM Tonette Sieving [facility]   6/16/2021 10:00 AM Tonette Sieving [facility]   6/22/2021 10:00 AM Tonette Sieving [facility]   6/30/2021 11:30 AM Tonette Sieving [facility]

## 2021-06-14 ENCOUNTER — HOSPITAL ENCOUNTER (OUTPATIENT)
Dept: PHYSICAL THERAPY | Age: 81
Discharge: HOME | End: 2021-06-14
Payer: MEDICARE

## 2021-06-14 PROCEDURE — 97110 THERAPEUTIC EXERCISES: CPT

## 2021-06-14 NOTE — PROGRESS NOTES
PHYSICAL THERAPY - DAILY TREATMENT NOTE    Patient Name: Joana Shelton        Date: 2021  : 1940    Patient  Verified: YES  Visit #:     Insurance: Payor: Sivakumar Haver / Plan: [place] MEDICARE PART A & B / Product Type: Medicare /      In time: 11:40 Out time: 12:30-   Total Treatment Time: 50     Medicare Time Tracking (below)   Total Timed Codes (min):  40 1:1 Treatment Time:  40     TREATMENT AREA/ DIAGNOSIS = Low back pain [M54.5]    SUBJECTIVE  Pain Level (on 0 to 10 scale):  0-1  / 10   Medication Changes/New allergies or changes in medical history, any new surgeries or procedures?     NO    If yes, update Summary List   Subjective Functional Status/Changes:  []  No changes reported     Pt reports pain with prolonged standing      OBJECTIVE  Modalities Rationale:     decrease inflammation and decrease pain to improve patient's ability to perform ADLs without pain     min [] Estim, type/location:                                      []  att     []  unatt     []  w/US     []  w/ice    []  w/heat    min []  Mechanical Traction: type/lbs                   []  pro   []  sup   []  int   []  cont    []  before manual    []  after manual    min []  Ultrasound, settings/location:      min []  Iontophoresis w/ dexamethasone, location:                                               []  take home patch       []  in clinic    min [x]  Ice     []  Heat    location/position:     min []  Vasopneumatic Device, press/temp:     min []  Other:    [] Skin assessment post-treatment (if applicable):    []  intact    []  redness- no adverse reaction     []redness  adverse reaction:        40 min Therapeutic Exercise:  [x]  See flow sheet   Rationale:      increase ROM and increase strength to improve the patients ability to perform ADLs without pain       Billed With/As:   [x] TE   [] TA   [] Neuro   [] Self Care Patient Education: [x] Review HEP    [] Progressed/Changed HEP based on:   [] positioning   [] body mechanics   [] transfers   [] heat/ice application    [] other:        Other Objective/Functional Measures:    Improved standing tolerance   Post Treatment Pain Level (on 0 to 10) scale:   0  / 10     ASSESSMENT  Assessment/Changes in Function:     Pt showing improved mechanics with exercise and less need for cueing for correction     []  See Progress Note/Recertification   Patient will continue to benefit from skilled PT services to modify and progress therapeutic interventions, address functional mobility deficits, address ROM deficits, address strength deficits, analyze and address soft tissue restrictions and analyze and cue movement patterns to attain remaining goals.    Progress toward goals / Updated goals:    Good Progress to    [] STG    [x] LTG  1 as shown by improved overall pain levels with ADLs     PLAN  [x]  Upgrade activities as tolerated YES Continue plan of care   []  Discharge due to :    []  Other:      Therapist: Elo Ly DPT     Date: 6/14/2021 Time: 12:20 PM        Future Appointments   Date Time Provider Jim Pendleton   6/16/2021 10:00 AM Aris Galea [facility]   6/22/2021 10:00 AM Aris Galea [facility]   6/30/2021 11:30 AM Aris Galea [facility]

## 2021-06-16 ENCOUNTER — HOSPITAL ENCOUNTER (OUTPATIENT)
Dept: PHYSICAL THERAPY | Age: 81
Discharge: HOME | End: 2021-06-16
Payer: MEDICARE

## 2021-06-16 PROCEDURE — 97110 THERAPEUTIC EXERCISES: CPT

## 2021-06-16 NOTE — PROGRESS NOTES
[address] [facility] PHYSICAL THERAPY AT [facility] [address] [place], [address]  Phone: [phone]  Fax: [phone] OF [address] [place] [address]          Patient Name: Santosh Gaona : 1940   Treatment/Medical Diagnosis: Low back pain [M54.5]   Onset Date: 2020    Referral Source: Jolanta Kay MD Start of Care [facility]): 2021   Prior Hospitalization: See Medical History Provider #: [id number]   Prior Level of Function: Pt was pain free with ADLs   Comorbidities: Pt reports high blood pressure and thyroid problems   Medications: Verified on Patient Summary List   Visits from [facility]: 16 Missed Visits: -       Goal/Measure of Progress Goal Met? 1. Increase score on FOTO to > or = 60 to demo an increase in functional activity tolerance   Status at last Eval: 54 Current Status: DNA, will assess in 2 weeks at discharge progressing   2. Pt will note < or = 2/10 pain with all mobility to improve comfort with ADLs. Status at last Eval: 0-4 Current Status: 0-3 progressing   3. Pt will demonstrate a GROC score of >/= +5 to show overall improvement in function   Status at last Eval: +2 Current Status: DNA, will assess in 2 weeks at discharge progressing       Key Functional Changes/Progress: Pt reports overall improvement of 75% since onset of care. Pt states she has seen improved standing tolerance to about 2hr from less than 15min at initial evaluation. Pt still gets occasional spasms with standing. Pt showing good tolerance to flexion based exercises. Pt is going out of town end of next week and is going to trial self management of symptoms.  Pt requires minimal cueing for proper mechanics with exercises   Problem List: decrease ROM and decrease strength   Treatment Plan may include any combination of the following: Therapeutic exercise, Therapeutic activities, Neuromuscular re-education, Physical agent/modality, Manual therapy, Patient education, Self Care training and Functional mobility training  Patient Goal(s) has been updated and includes:      Goals for this certification period include and are to be achieved in   2  weeks:  Continue goals above  Frequency / Duration:   Patient to be seen   1   times per week for   2    weeks:    Assessments/Recommendations: Pt to continue PT for 2 more visits over the next week to progress current program and discharge to self care. Pt want to transition to self care for the back so she can be evaluated and treated for balance issues in PT. If you have any questions/comments please contact us directly at [phone]. Thank you for allowing us to assist in the care of your patient. Therapist Signature: Ashok James Date: 0/97/7711   Certification Period:  Reporting Period: 6/16/2021-9/16/2021 5/19/2021-6/16/2021 Time: 10:03 AM   NOTE TO PHYSICIAN:  PLEASE COMPLETE THE ORDERS BELOW AND FAX TO   [facility] at [address]: [phone]. If you are unable to process this request in 24 hours please contact our office: [phone].    ___ I have read the above report and request that my patient continue as recommended.   ___ I have read the above report and request that my patient continue therapy with the following changes/special instructions: ________________________________________________   ___ I have read the above report and request that my patient be discharged from therapy.      Physician Signature:        Date:       Time:    Rosario Brooks MD.

## 2021-06-16 NOTE — PROGRESS NOTES
PHYSICAL THERAPY - DAILY TREATMENT NOTE    Patient Name: Stefany Slater        Date: 2021  : 1940    Patient  Verified: YES  Visit #:     Insurance: Payor: Maddi Hind / Plan: VA MEDICARE PART A & B / Product Type: Medicare /      In time: 10:10 Out time: 11:10   Total Treatment Time: 60     Medicare Time Tracking (below)   Total Timed Codes (min):  50 1:1 Treatment Time:  50     TREATMENT AREA/ DIAGNOSIS = Low back pain [M54.5]    SUBJECTIVE  Pain Level (on 0 to 10 scale):  0-1  / 10   Medication Changes/New allergies or changes in medical history, any new surgeries or procedures?     NO    If yes, update Summary List   Subjective Functional Status/Changes:  []  No changes reported     See Recert      OBJECTIVE  Modalities Rationale:     decrease inflammation and decrease pain to improve patient's ability to perform ADLs without pain     min [] Estim, type/location:                                      []  att     []  unatt     []  w/US     []  w/ice    []  w/heat    min []  Mechanical Traction: type/lbs                   []  pro   []  sup   []  int   []  cont    []  before manual    []  after manual    min []  Ultrasound, settings/location:      min []  Iontophoresis w/ dexamethasone, location:                                               []  take home patch       []  in clinic    min []  Ice     []  Heat    location/position:     min []  Vasopneumatic Device, press/temp:     min []  Other:    [] Skin assessment post-treatment (if applicable):    []  intact    []  redness- no adverse reaction     []redness  adverse reaction:        45 min Therapeutic Exercise:  [x]  See flow sheet   Rationale:      increase ROM and increase strength to improve the patients ability to perform ADLs without pain       5 min Manual Therapy: PROM to B hips   Rationale:      increase ROM and increase tissue extensibility to improve patient's ability to perform ADLs without pain  The manual therapy interventions were performed at a separate and distinct time from the therapeutic activities interventions      Billed With/As:   [x] TE   [] TA   [] Neuro   [] Self Care Patient Education: [x] Review HEP    [] Progressed/Changed HEP based on:   [] positioning   [] body mechanics   [] transfers   [] heat/ice application    [] other:        Other Objective/Functional Measures:    See Recert   Post Treatment Pain Level (on 0 to 10) scale:   0  / 10     ASSESSMENT  Assessment/Changes in Function:     See Recert     []  See Progress Note/Recertification   Patient will continue to benefit from skilled PT services to modify and progress therapeutic interventions, address functional mobility deficits, address ROM deficits, address strength deficits and analyze and address soft tissue restrictions to attain remaining goals.    Progress toward goals / Updated goals:    See Recert     PLAN  [x]  Upgrade activities as tolerated YES Continue plan of care   []  Discharge due to :    []  Other:      Therapist: Vicenta Lopez DPT     Date: 6/16/2021 Time: 10:00 AM        Future Appointments   Date Time Provider Jim Pendleton   6/22/2021 10:00 AM Idella Pontes [facility]   6/30/2021 11:30 AM Idella Pontes [facility]

## 2021-06-22 ENCOUNTER — HOSPITAL ENCOUNTER (OUTPATIENT)
Dept: PHYSICAL THERAPY | Age: 81
Discharge: HOME | End: 2021-06-22
Payer: MEDICARE

## 2021-06-22 PROCEDURE — 97110 THERAPEUTIC EXERCISES: CPT

## 2021-06-22 NOTE — PROGRESS NOTES
PHYSICAL THERAPY - DAILY TREATMENT NOTE    Patient Name: Ericka Swift        Date: 2021  : 1940    Patient  Verified: YES  Visit #:   16   of   18  Insurance: Payor: Yanna No / Plan: [place] MED[place]ARE [place] & B / Product Type: Medicare /      In time: 10:00 Out time: 10:50   Total Treatment Time: 50     Medicare Time Tracking (below)   Total Timed Codes (min):  45 1:1 Treatment Time:  45     TREATMENT AREA/ DIAGNOSIS = Low back pain [M54.5]    SUBJECTIVE  Pain Level (on 0 to 10 scale):  0  / 10   Medication Changes/New allergies or changes in medical history, any new surgeries or procedures? NO    If yes, update Summary List   Subjective Functional Status/Changes:  []  No changes reported     Pt reports no pain currently. Pt still states that it hurts with prolonged standing.       OBJECTIVE  Modalities Rationale:     decrease inflammation and decrease pain to improve patient's ability to perform ADLs without pain     min [] Estim, type/location:                                      []  att     []  unatt     []  w/US     []  w/ice    []  w/heat    min []  Mechanical Traction: type/lbs                   []  pro   []  sup   []  int   []  cont    []  before manual    []  after manual    min []  Ultrasound, settings/location:      min []  Iontophoresis w/ dexamethasone, location:                                               []  take home patch       []  in clinic   5 min [x]  Ice     []  Heat    location/position:     min []  Vasopneumatic Device, press/temp:     min []  Other:    [] Skin assessment post-treatment (if applicable):    []  intact    []  redness- no adverse reaction     []redness  adverse reaction:      35  45 min Therapeutic Exercise:  [x]  See flow sheet   Rationale:      increase ROM and increase strength to improve the patients ability to perform ADLs without pain     Billed With/As:   [x] TE   [] TA   [] Neuro   [] Self Care Patient Education: [x] Review HEP    [] Progressed/Changed HEP based on:   [] positioning   [] body mechanics   [] transfers   [] heat/ice application    [] other:        Other Objective/Functional Measures:    Improved hip mobility noted. Post Treatment Pain Level (on 0 to 10) scale:   0  / 10     ASSESSMENT  Assessment/Changes in Function:     Pt demonstrating improved tolerance to flexion based exercises. One more visit next week and then discharging due to current progress    Pt going on long car ride out of town. Will assess next week when she returns     []  See Progress Note/Recertification   Patient will continue to benefit from skilled PT services to modify and progress therapeutic interventions, address functional mobility deficits, address ROM deficits, address strength deficits, analyze and address soft tissue restrictions and analyze and cue movement patterns to attain remaining goals.    Progress toward goals / Updated goals:    Good Progress to    [] STG    [] LTG  1 as shown by improved overall pain levels with ADL     PLAN  []  Upgrade activities as tolerated YES Continue plan of care   []  Discharge due to :    []  Other:      Therapist: Frandy Welch DPT     Date: 6/22/2021 Time: 10:32 AM        Future Appointments   Date Time Provider Jim Pendleton   6/30/2021 11:30 AM Chuck Lau [facility]

## 2021-06-30 ENCOUNTER — HOSPITAL ENCOUNTER (OUTPATIENT)
Dept: PHYSICAL THERAPY | Age: 81
Discharge: HOME | End: 2021-06-30
Payer: MEDICARE

## 2021-06-30 PROCEDURE — 97110 THERAPEUTIC EXERCISES: CPT

## 2021-06-30 PROCEDURE — 97140 MANUAL THERAPY 1/> REGIONS: CPT

## 2021-06-30 NOTE — PROGRESS NOTES
PHYSICAL THERAPY - DAILY TREATMENT NOTE    Patient Name: Socorro Amaro        Date: 2021  : 1940    Patient  Verified: YES  Visit #:     Insurance: Payor: Michael Knight / Plan: [place] MEDICARE PART A & B / Product Type: Medicare /      In time: 11:30 Out time: 12:25   Total Treatment Time: 55     Medicare Time Tracking (below)   Total Timed Codes (min):  45 1:1 Treatment Time:  40     TREATMENT AREA/ DIAGNOSIS = Low back pain [M54.5]    SUBJECTIVE  Pain Level (on 0 to 10 scale):  0  / 10   Medication Changes/New allergies or changes in medical history, any new surgeries or procedures? NO    If yes, update Summary List   Subjective Functional Status/Changes:  []  No changes reported     Pt reports her back is feeling much better.  Improved standing tolerance to greater than a couple hours      OBJECTIVE  Modalities Rationale:     decrease inflammation to improve patient's ability to perform ADLs without pain     min [] Estim, type/location:                                      []  att     []  unatt     []  w/US     []  w/ice    []  w/heat    min []  Mechanical Traction: type/lbs                   []  pro   []  sup   []  int   []  cont    []  before manual    []  after manual    min []  Ultrasound, settings/location:      min []  Iontophoresis w/ dexamethasone, location:                                               []  take home patch       []  in clinic   10 min [x]  Ice     []  Heat    location/position: L/S    min []  Vasopneumatic Device, press/temp:     min []  Other:    [] Skin assessment post-treatment (if applicable):    []  intact    []  redness- no adverse reaction     []redness  adverse reaction:        30 min Therapeutic Exercise:  [x]  See flow sheet   Rationale:      increase ROM and increase strength to improve the patients ability to perform ADLs without pain       10 min Manual Therapy: PROM to B hips   Rationale:      increase ROM and increase tissue extensibility to improve patient's ability to perform ADLs without pain  The manual therapy interventions were performed at a separate and distinct time from the therapeutic activities interventions      Billed With/As:   [x] TE   [] TA   [] Neuro   [] Self Care Patient Education: [x] Review HEP    [] Progressed/Changed HEP based on:   [] positioning   [] body mechanics   [] transfers   [] heat/ice application    [] other:        Other Objective/Functional Measures:    Improved overall hip mobility. Improve L/S flexion   Post Treatment Pain Level (on 0 to 10) scale:   0  / 10     ASSESSMENT  Assessment/Changes in Function:     Pt improved LE strength     []  See Progress Note/Recertification   Patient will continue to benefit from skilled PT services to modify and progress therapeutic interventions, address functional mobility deficits, address ROM deficits, address strength deficits and analyze and address soft tissue restrictions to attain remaining goals.    Progress toward goals / Updated goals:    Met FOTO goal     PLAN  [x]  Upgrade activities as tolerated YES Continue plan of care   []  Discharge due to :    []  Other:      Therapist: Anahy Wing DPT     Date: 6/30/2021 Time: 10:00 AM        Future Appointments   Date Time Provider Jim Pendleton   6/30/2021 11:30 AM Jer Rodrigez [facility]

## 2021-07-06 ENCOUNTER — HOSPITAL ENCOUNTER (OUTPATIENT)
Dept: PHYSICAL THERAPY | Age: 81
Discharge: HOME | End: 2021-07-06
Payer: MEDICARE

## 2021-07-06 PROCEDURE — 97161 PT EVAL LOW COMPLEX 20 MIN: CPT

## 2021-07-06 PROCEDURE — 97112 NEUROMUSCULAR REEDUCATION: CPT

## 2021-07-06 NOTE — PROGRESS NOTES
PHYSICAL THERAPY - DAILY TREATMENT NOTE    Patient Name: Danna Winter        Date: 2021  : 1940    Patient  Verified: YES  Visit #:      12  Insurance: Payor: Yane Grider / Plan: [place] MEDICARE PART A & B / Product Type: Medicare /      In time: 10:45 Out time: 11:30   Total Treatment Time: 45     Medicare Time Tracking (below)   Total Timed Codes (min):  15 1:1 Treatment Time:  15     TREATMENT AREA/ DIAGNOSIS = Other abnormalities of gait and mobility [R26.89]    SUBJECTIVE  Pain Level (on 0 to 10 scale):  0  / 10   Medication Changes/New allergies or changes in medical history, any new surgeries or procedures? NO    If yes, update Summary List   Subjective Functional Status/Changes:  []  No changes reported     See Eval      OBJECTIVE    15 min Neuromuscular Re-ed: [x]  See flow sheet   Rationale:    improve balance and increase proprioception to improve the patients ability to perform ADLs without pain    Billed With/As:   [x] TE   [] TA   [] Neuro   [] Self Care Patient Education: [x] Review HEP    [] Progressed/Changed HEP based on:   [] positioning   [] body mechanics   [] transfers   [] heat/ice application    [] other:        Other Objective/Functional Measures:    See Eval   Post Treatment Pain Level (on 0 to 10) scale:   0  / 10     ASSESSMENT  Assessment/Changes in Function:     See Eval     []  See Progress Note/Recertification   Patient will continue to benefit from skilled PT services to modify and progress therapeutic interventions, address functional mobility deficits, address strength deficits, analyze and modify body mechanics/ergonomics and address imbalance/dizziness to attain remaining goals.    Progress toward goals / Updated goals:    See Eval     PLAN  [x]  Upgrade activities as tolerated YES Continue plan of care   []  Discharge due to :    []  Other:      Therapist: Alyson Mcallister DPT     Date: 2021 Time: 12:22 PM        Future Appointments   Date Time Provider Department Center   7/8/2021  2:15 PM Rheba Sales [facility]   7/13/2021  2:15 PM Rheba Sales [facility]   7/15/2021  1:30 PM Rheba Sales [facility]   7/21/2021 10:45 AM Rheba Sales [facility]   7/26/2021 12:15 PM Rheba Sales [facility]   7/28/2021 10:45 AM Rheba Sales [facility]   8/2/2021 10:45 AM Rheba Sales [facility]   8/4/2021 10:45 AM Rheba Sales [facility]   8/9/2021 10:45 AM Rheba Sales [facility]   8/11/2021 10:45 AM Rheba Sales [facility]   8/18/2021 10:45 AM Rheba Sales [facility]   8/20/2021 12:00 PM Rheba Sales [facility]

## 2021-07-06 NOTE — PROGRESS NOTES
[address] [facility] PHYSICAL THERAPY AT [facility] [address], [address] - Phone: [phone]  Fax: [phone] / [address] [facility]  Patient Name: Kirstin Chan : 1940   Treatment   Diagnosis: Balance impairment  Medical   Diagnosis: Other abnormalities of gait and mobility [R26.89]   Onset Date: Chronic      Referral Source: Chaparro Maher MD [facility]): 2021   Prior Hospitalization: See medical history Provider #: [id number]   Prior Level of Function: Pt has had chronic balance difficulties over the past few years    Comorbidities: Pt reports thyroid problems and high blood pressure. Medications: Verified on Patient Summary List   The Plan of Care and following information is based on the information from the initial evaluation.   ==================================================================================  Assessment / key information:  Pt is a 69 yo female that presents to PT with chief complaint of decreased balance and fear of falling. Pt was recently seen for low back pain but reports she is feeling much better from it. Pt reports difficulty with ascending and descending stairs. Pt report difficulty putting on clothes due to fear of falling. Pt demonstrates LE strength WFL. Chowdhury Balance scale: 45/56 indicating fall risk. Functional Gait Assessment: 15/30 indicating fall risk. Pt will benefit from PT interventions to address the aforementioned deficits and allow pt to return to OF.    Eval Complexity: History LOW Complexity : Zero comorbidities / personal factors that will impact the outcome / POC;  Examination  LOW Complexity : 1-2 Standardized tests and measures addressing body structure, function, activity limitation and / or participation in recreation ; Presentation LOW Complexity : Stable, uncomplicated ;  Decision Making MEDIUM Complexity : FOTO score of 26-74; [facility] Medicine Daily Progress Note    Date of Service  6/23/2019    Chief Complaint  89 y.o. female admitted 6/20/2019 with leg swelling     Hospital Course    89-year-old female past medical history of severe tricuspid insufficiency, pulmonary hypertension was admitted for worsening leg swelling or shortness of breath. She started on IV lasix and she is doing better. Acute on chronic CKD on admission       Interval Problem Update  6/22.  Patient continues to have bilateral lower extremity edema.  Worsening signs of hypokalemia this morning.  Aggressive p.o. replaced.  Patient cannot tolerate IV replace.  Patient complains of general fatigue and body achiness. Patient's pain is general 2-3/10, intermittent and does not radiate to other location, sharp and with some tingling. Can be controlled by pain meds.  6/23. Patient lower extremity edema slightly improved still significant to yesterday.  Patient has no significant signs of shortness of breath.  Patient diuresed very well on IV Bumex.  Potassium slightly improved. Patient otherwise denies fever, chills, nausea, vomiting, adb pain, SOB, CP, headache, constipation, diarrhea, cough, or sputum.      Consultants/Specialty  Cardiology     Code Status  DNAR/DNI   Total time spent on advanced care planning, excluding time spent on daily care: 16 minutes.    1st 30 minutes [id number]     I discussed extensively with patient and patient's family is regarding code status and plan of care. We also discussed advanced care planning including diagnosis, prognosis, plan of care, risks and benefits of any therapies that could be offered, as well as alternatives including palliation and hospice, as appropriate. My discussion is summarized above in detail. Confirmed with DNR      Disposition  inpatient     Review of Systems  Review of Systems   Constitutional: Negative for fever and weight loss.   HENT: Negative for congestion, ear pain, hearing loss, nosebleeds and sore throat.     Eyes: Negative for blurred vision and pain.   Respiratory: Positive for shortness of breath. Negative for cough, sputum production and wheezing.    Cardiovascular: Positive for orthopnea and leg swelling. Negative for chest pain, palpitations and PND.   Gastrointestinal: Negative for abdominal pain, constipation, heartburn and nausea.   Genitourinary: Negative for dysuria, frequency, hematuria and urgency.   Musculoskeletal: Negative for falls, joint pain and myalgias.   Skin: Negative for rash.   Neurological: Negative for dizziness, tremors, seizures, weakness and headaches.   Psychiatric/Behavioral: Negative for depression, substance abuse and suicidal ideas.        Physical Exam  Temp:  [36.2 °C (97.2 °F)-36.5 °C (97.7 °F)] 36.5 °C (97.7 °F)  Pulse:  [67-88] 67  Resp:  [16-18] 16  BP: (105-148)/(49-62) 114/49  SpO2:  [93 %-100 %] 97 %    Physical Exam   Constitutional: She is oriented to person, place, and time. She appears well-developed and well-nourished. No distress.   HENT:   Head: Normocephalic and atraumatic.   Nose: Nose normal.   Mouth/Throat: No oropharyngeal exudate.   Eyes: Conjunctivae and EOM are normal.   Neck: Normal range of motion. Neck supple. No thyromegaly present.   Cardiovascular: Normal rate and regular rhythm.  Exam reveals no gallop.    Murmur heard.  Pulmonary/Chest: Effort normal. No respiratory distress. She has no wheezes. She has rales. She exhibits no tenderness.   Abdominal: Soft. Bowel sounds are normal. She exhibits no distension and no mass. There is no tenderness. There is no guarding.   Musculoskeletal: Normal range of motion. She exhibits edema. She exhibits no tenderness.   Lymphadenopathy:     She has no cervical adenopathy.   Neurological: She is alert and oriented to person, place, and time. No cranial nerve deficit.   Skin: Skin is warm. No rash noted. She is not diaphoretic.   Psychiatric: She has a normal mood and affect. Her behavior is normal.  Overall Complexity LOW   ==================================================================================  Problem List: decrease ROM, decrease strength and impaired gait/ balance   Treatment Plan may include any combination of the following: Therapeutic exercise, Therapeutic activities, Neuromuscular re-education, Physical agent/modality, Gait/balance training, Manual therapy, Patient education, Self Care training and Functional mobility training  Patient / Family readiness to learn indicated by: asking questions, trying to perform skills and interest  Persons(s) to be included in education: patient (P)  Barriers to Learning/Limitations: no  Measures taken:    Patient Goal (s): \"improve balance\"   Patient self reported health status: good  Rehabilitation Potential: good   Short Term Goals: To be accomplished in  3  weeks:  1. Pt will be independent and compliant with HEP to decrease pain, increase ROM and return pt to PLOF. 2. Pt will demonstrate a GROC score of >/= +2 to show overall improvement in function     Long Term Goals: To be accomplished in  6  weeks:  1. Increase score on FOTO to > or = 65 to demo an increase in functional activity tolerance. 2. Pt will note < or = 2/10 pain with all mobility to improve comfort with ADLs. 3. Pt will demonstrate a GROC score of >/= +5 to show overall improvement in function  Frequency / Duration:   Patient to be seen  2  times per week for 6  weeks:  Patient / Caregiver education and instruction: self care, activity modification and exercises    Therapist Signature: Beti Storm PT Date: 0/6/6923   Certification Period: 7/6/2021-10/6/2021 Time: 12:22 PM   ===========================================================================================  I certify that the above Physical Therapy Services are being furnished while the patient is under my care. I agree with the treatment plan and certify that this therapy is necessary.     Physician Signature:       Fluids    Intake/Output Summary (Last 24 hours) at 06/23/19 1818  Last data filed at 06/23/19 1300   Gross per 24 hour   Intake             1072 ml   Output             2765 ml   Net            -1693 ml       Laboratory  Recent Labs      06/21/19   0301  06/22/19   0251  06/23/19   0204   WBC  4.3*  4.5*  4.0*   RBC  2.93*  3.12*  2.92*   HEMOGLOBIN  8.2*  8.8*  7.9*   HEMATOCRIT  27.1*  27.9*  26.7*   MCV  92.5  89.4  91.4   MCH  28.0  28.2  27.1   MCHC  30.3*  31.5*  29.6*   RDW  55.9*  53.3*  54.5*   PLATELETCT  109*  123*  123*   MPV  10.7  10.8  10.6     Recent Labs      06/21/19   0301  06/22/19   0251  06/22/19   1200  06/23/19   0204   SODIUM  138  141   --   142   POTASSIUM  3.4*  2.6*  2.9*  3.3*   CHLORIDE  105  100   --   103   CO2  26  29   --   28   GLUCOSE  108*  114*   --   135*   BUN  44*  49*   --   51*   CREATININE  1.59*  1.63*   --   1.58*   CALCIUM  8.0*  8.3*   --   8.3*         Recent Labs      06/22/19   0251   BNPBTYPENAT  941*           Imaging  EC-ECHOCARDIOGRAM COMPLETE W/O CONT   Final Result           Assessment/Plan  Hypokalemia:  -Likely due to diuresis  -Continue aggressive replacement  -Potassium 2.9->3.3  Close monitor on telemetry no signs of arrhythmia  MRAICRUZ (acute kidney injury) (HCC)- (present on admission)   Assessment & Plan    Resolving. Likely cardiorenal. Back to baseline. Pt would not like to consult nephrology at this time. She is not interested on dialysis.   Cr 1.5->1.7->1.5 stable  Low K  supplement     Severe tricuspid regurgitation- (present on admission)   Assessment & Plan    Echo pending   Cards recommendation appreciated     Pulmonary hypertension (HCC)- (present on admission)   Assessment & Plan    Patient was put on IV diuretics currently stopped by cardiologist due to severe hypokalemia.  Aggressive replace potassium with oral     Bilateral lower extremity edema- (present on admission)   Assessment & Plan    Better   Currently diuretics restarted with  Date:       Time:        Debra Sarkar MD    Please sign and return to [facility] [place] or you may fax the signed copy to [phone]. Thank you. Bumex IV 1 mg twice daily  Continue sodium restriction and fluid restriction     Chronic respiratory failure with hypoxia (HCC)- (present on admission)   Assessment & Plan    Keep O2 sats above 92%  RT protocol     CKD (chronic kidney disease) stage 3, GFR 30-59 ml/min (HCC)- (present on admission)   Assessment & Plan    Stable      COPD (chronic obstructive pulmonary disease) (HCC)- (present on admission)   Assessment & Plan    Continue Fluticasone, RT protocol     Chronic atrial fibrillation (HCC)- (present on admission)   Assessment & Plan    Telemetry monitoring  Eliquis     Restless legs syndrome (RLS)- (present on admission)   Assessment & Plan    Continue Requip     Hypothyroidism- (present on admission)   Assessment & Plan    Continue Synthroid, check TSH     GERD (gastroesophageal reflux disease)- (present on admission)   Assessment & Plan    Continue Omeprazole     Cardiac pacemaker in situ- (present on admission)   Assessment & Plan    Telemetry monitoring          VTE prophylaxis:  apixaban     Current Facility-Administered Medications:   •  potassium chloride SA (Kdur) tablet 40 mEq, 40 mEq, Oral, BID, Katelyn Jacques M.D., 40 mEq at 06/23/19 1655  •  bumetanide (BUMEX) injection 1 mg, 1 mg, Intravenous, BID DIURETIC, Katelyn Jacques M.D., 1 mg at 06/23/19 1655  •  hydrocortisone rectal (PROCTOZONE HC) 2.5 % cream, , Rectal, QDAY PRN, Katelyn Jacques M.D., 1 Application at 06/23/19 1224  •  albuterol inhaler 2 Puff, 2 Puff, Inhalation, Q6HRS PRN, Storm Farah M.D.  •  clopidogrel (PLAVIX) tablet 75 mg, 75 mg, Oral, DAILY, Storm Farah M.D., 75 mg at 06/23/19 0523  •  cyanocobalamin (VITAMIN B-12) tablet 1,000 mcg, 1,000 mcg, Oral, DAILY, Storm Farah M.D., 1,000 mcg at 06/23/19 0523  •  apixaban (ELIQUIS) 2.5mg tablet 2.5 mg, 2.5 mg, Oral, BID, Storm Farah M.D., 2.5 mg at 06/23/19 1655  •  ferrous sulfate tablet 325 mg, 325 mg, Oral, Q48HRS, Storm Farah M.D., 325 mg at 06/23/19 0523  •   fluticasone (FLOVENT HFA) 110 MCG/ACT inhaler 110 mcg, 1 Puff, Inhalation, DAILY, Storm Farah M.D., 110 mcg at 06/23/19 0525  •  umeclidinium-vilanterol (ANORO ELLIPTA) inhaler 1 Puff, 1 Puff, Inhalation, DAILY, Storm Farah M.D., 1 Puff at 06/23/19 0525  •  levothyroxine (SYNTHROID) tablet 50 mcg, 50 mcg, Oral, AM ES, Storm Farah M.D., 50 mcg at 06/23/19 0523  •  metoprolol SR (TOPROL XL) tablet 25 mg, 25 mg, Oral, QAM, Storm Farah M.D., 25 mg at 06/23/19 0524  •  omeprazole (PRILOSEC) capsule 20 mg, 20 mg, Oral, BID, Storm Farah M.D., 20 mg at 06/23/19 1655  •  ROPINIRole (REQUIP) tablet 1 mg, 1 mg, Oral, TID, Storm Farah M.D., 1 mg at 06/23/19 1655  •  rosuvastatin (CRESTOR) tablet 10 mg, 10 mg, Oral, Q EVENING, Storm Farah M.D., 10 mg at 06/23/19 1656  •  sertraline (ZOLOFT) tablet 100 mg, 100 mg, Oral, QHS, Storm Farah M.D., 100 mg at 06/22/19 2004  •  topiramate (TOPAMAX) tablet 25 mg, 25 mg, Oral, BID, Storm Farah M.D., 25 mg at 06/23/19 1656  •  senna-docusate (PERICOLACE or SENOKOT S) 8.6-50 MG per tablet 2 Tab, 2 Tab, Oral, BID, Stopped at 06/23/19 0600 **AND** polyethylene glycol/lytes (MIRALAX) PACKET 1 Packet, 1 Packet, Oral, QDAY PRN **AND** magnesium hydroxide (MILK OF MAGNESIA) suspension 30 mL, 30 mL, Oral, QDAY PRN **AND** bisacodyl (DULCOLAX) suppository 10 mg, 10 mg, Rectal, QDAY PRN, Storm Farah M.D.  •  Respiratory Care per Protocol, , Nebulization, Continuous RT, Storm Farah M.D.  •  acetaminophen (TYLENOL) tablet 650 mg, 650 mg, Oral, Q6HRS PRN, Storm Farah M.D., 650 mg at 06/23/19 6026  •  ondansetron (ZOFRAN) syringe/vial injection 4 mg, 4 mg, Intravenous, Q4HRS PRN, Storm Farah M.D.  •  ondansetron (ZOFRAN ODT) dispertab 4 mg, 4 mg, Oral, Q4HRS PRN, Storm Farah M.D.

## 2021-07-08 ENCOUNTER — HOSPITAL ENCOUNTER (OUTPATIENT)
Dept: PHYSICAL THERAPY | Age: 81
Discharge: HOME | End: 2021-07-08
Payer: MEDICARE

## 2021-07-08 PROCEDURE — 97112 NEUROMUSCULAR REEDUCATION: CPT

## 2021-07-08 NOTE — PROGRESS NOTES
PHYSICAL THERAPY - DAILY TREATMENT NOTE    Patient Name: Verena Rivera        Date: 2021  : 1940    Patient  Verified: YES  Visit #:   2   of   12  Insurance: Payor: Venus Mejia / Plan: [place] & B / Product Type: Medicare /      In time: 2:15 Out time: 2:58   Total Treatment Time: 43     Medicare Time Tracking (below)   Total Timed Codes (min):  43 1:1 Treatment Time:  40     TREATMENT AREA/ DIAGNOSIS = Other abnormalities of gait and mobility [R26.89]    SUBJECTIVE  Pain Level (on 0 to 10 scale):  0  / 10   Medication Changes/New allergies or changes in medical history, any new surgeries or procedures? NO    If yes, update Summary List   Subjective Functional Status/Changes:  []  No changes reported     Pt reports that her balance is still poor and still feels unsteady going up and down stairs      OBJECTIVE        43 min Neuromuscular Re-ed: [x]  See flow sheet   Rationale:    improve balance and increase proprioception to improve the patients ability to perform ADLs without pain    Billed With/As:   [] TE   [] TA   [x] Neuro   [] Self Care Patient Education: [x] Review HEP    [] Progressed/Changed HEP based on:   [] positioning   [] body mechanics   [] transfers   [] heat/ice application    [] other:        Other Objective/Functional Measures:    Increased sway with all eyes closed balancing    Post Treatment Pain Level (on 0 to 10) scale:   0  / 10     ASSESSMENT  Assessment/Changes in Function:     Pt shows visual dependence with balance activities      []  See Progress Note/Recertification   Patient will continue to benefit from skilled PT services to modify and progress therapeutic interventions, address functional mobility deficits, analyze and modify body mechanics/ergonomics and address imbalance/dizziness to attain remaining goals. Progress toward goals / Updated goals:    No progress.  Just initiated plan of care     PLAN  [x]  Upgrade activities as tolerated YES Continue plan of care   []  Discharge due to :    []  Other:      Therapist: Anahy Wing DPT     Date: 7/8/2021 Time: 8:33 AM        Future Appointments   Date Time Provider Jim Pendleton   7/8/2021  2:15 PM Geraline Arnt [facility]   7/13/2021  2:15 PM Geraline Arnt [facility]   7/15/2021  1:30 PM Geraline Arnt [facility]   7/26/2021 12:15 PM Geraline Arnt [facility]   7/28/2021 10:45 AM Geraline Arnt [facility]   8/2/2021 10:45 [facility]   8/4/2021 10:45 [facility]   8/9/2021 10:45 [facility]   8/11/2021 10:45 AM Geraline Arnt [facility]   8/18/2021 10:45 [facility]   8/20/2021 12:00 PM Geraline Arnt [facility]

## 2021-07-13 ENCOUNTER — APPOINTMENT (OUTPATIENT)
Dept: PHYSICAL THERAPY | Age: 81
End: 2021-07-13
Payer: MEDICARE

## 2021-07-15 ENCOUNTER — APPOINTMENT (OUTPATIENT)
Dept: PHYSICAL THERAPY | Age: 81
End: 2021-07-15
Payer: MEDICARE

## 2021-07-21 ENCOUNTER — HOSPITAL ENCOUNTER (OUTPATIENT)
Dept: PHYSICAL THERAPY | Age: 81
Discharge: HOME | End: 2021-07-21
Payer: MEDICARE

## 2021-07-21 PROCEDURE — 97112 NEUROMUSCULAR REEDUCATION: CPT

## 2021-07-21 NOTE — PROGRESS NOTES
PHYSICAL THERAPY - DAILY TREATMENT NOTE    Patient Name: Tiffany Newton        Date: 2021  : 1940    Patient  Verified: YES  Visit #:   3   of   12  Insurance: Payor: Michael Knight / Plan: [place] MEDICARE PART A & B / Product Type: Medicare /      In time: 11:00 Out time: 11:30   Total Treatment Time: 30     Medicare Time Tracking (below)   Total Timed Codes (min):  30 1:1 Treatment Time:  30     TREATMENT AREA/ DIAGNOSIS = Other abnormalities of gait and mobility [R26.89]    SUBJECTIVE  Pain Level (on 0 to 10 scale):  0  / 10   Medication Changes/New allergies or changes in medical history, any new surgeries or procedures? NO    If yes, update Summary List   Subjective Functional Status/Changes:  []  No changes reported     Pt reports still feeling off balance with walking. OBJECTIVE      30 min Neuromuscular Re-ed: [x]  See flow sheet   Rationale:    improve coordination, improve balance and increase proprioception to improve the patients ability to perform ADLs without pain    Billed With/As:   [x] TE   [] TA   [] Neuro   [] Self Care Patient Education: [x] Review HEP    [] Progressed/Changed HEP based on:   [] positioning   [] body mechanics   [] transfers   [] heat/ice application    [] other:        Other Objective/Functional Measures:    Increased sway with eyes closed    Post Treatment Pain Level (on 0 to 10) scale:   0  / 10     ASSESSMENT  Assessment/Changes in Function:     Pt still showing decreased dynamic balance     []  See Progress Note/Recertification   Patient will continue to benefit from skilled PT services to modify and progress therapeutic interventions, address functional mobility deficits, address ROM deficits, address strength deficits, analyze and address soft tissue restrictions and analyze and cue movement patterns to attain remaining goals.    Progress toward goals / Updated goals:    Good Progress to    [] STG    [x] LTG  1 as shown by improved static balance since onset of care     PLAN  [x]  Upgrade activities as tolerated YES Continue plan of care   []  Discharge due to :    []  Other:      Therapist: Kira Lala DPT     Date: 7/21/2021 Time: 9:33 AM        Future Appointments   Date Time Provider Jim Pendleton   7/21/2021 10:45 AM Edmund Mayda [facility]   7/26/2021 12:15 PM Edmund Pons [facility]   7/28/2021 10:45 AM Edmund Mayda [facility]   8/2/2021 10:45 AM Edmund Mayda [facility]   8/4/2021 10:45 AM Edmund Mayda [facility]   8/9/2021 10:45 AM Edmund Mayda [facility]   8/11/2021 10:45 AM Edmund Mayda [facility]   8/18/2021 10:45 AM Edmund Mayda [facility]   8/20/2021 12:00 PM Edmund Pons [facility]

## 2021-07-26 ENCOUNTER — HOSPITAL ENCOUNTER (OUTPATIENT)
Dept: PHYSICAL THERAPY | Age: 81
Discharge: HOME | End: 2021-07-26
Payer: MEDICARE

## 2021-07-26 PROCEDURE — 97112 NEUROMUSCULAR REEDUCATION: CPT

## 2021-07-26 NOTE — PROGRESS NOTES
PHYSICAL THERAPY - DAILY TREATMENT NOTE    Patient Name: Chery Adrian        Date: 2021  : 1940    Patient  Verified: YES  Visit #:      of   12  Insurance: Payor: Thang Locker / Plan: [place] MEDICARE PART A & B / Product Type: Medicare /      In time: 12:20 Out time: 1:00   Total Treatment Time: 40     Medicare Time Tracking (below)   Total Timed Codes (min):  40 1:1 Treatment Time:  40     TREATMENT AREA/ DIAGNOSIS = Other abnormalities of gait and mobility [R26.89]    SUBJECTIVE  Pain Level (on 0 to 10 scale):  0  / 10   Medication Changes/New allergies or changes in medical history, any new surgeries or procedures? NO    If yes, update Summary List   Subjective Functional Status/Changes:  []  No changes reported     Pt reports that she is still having difficulty with walking and her balance feels off      OBJECTIVE    40 min Neuromuscular Re-ed: [x]  See flow sheet   Rationale:    improve coordination, improve balance and increase proprioception to improve the patients ability to perform ADLs without pain    Billed With/As:   [] TE   [] TA   [x] Neuro   [] Self Care Patient Education: [x] Review HEP    [] Progressed/Changed HEP based on:   [] positioning   [] body mechanics   [] transfers   [] heat/ice application    [] other:        Other Objective/Functional Measures:    Decreased balance with EC   Post Treatment Pain Level (on 0 to 10) scale:   0  / 10     ASSESSMENT  Assessment/Changes in Function:     Pt improved overall balance since onset of care. Pt still requires HHA with balance board. Try swiss ball sitting next visit for improved balance and core activation       []  See Progress Note/Recertification   Patient will continue to benefit from skilled PT services to modify and progress therapeutic interventions, address functional mobility deficits, address ROM deficits, address strength deficits and analyze and address soft tissue restrictions to attain remaining goals.    Progress toward goals / Updated goals:    Good Progress to    [] STG    [x] LTG  1 as shown by improved overall balance since onset of care     PLAN  [x]  Upgrade activities as tolerated YES Continue plan of care   []  Discharge due to :    []  Other:      Therapist: Yanni Bajwa DPT     Date: 7/26/2021 Time: 1:35 PM        Future Appointments   Date Time Provider Jim Pendleton   7/28/2021 10:45 AM Geradine Balloon [facility]   8/2/2021 10:45 AM Geradine Balloon [facility]   8/4/2021 10:45 AM Geradine Balloon [facility]   8/9/2021 10:45 AM Geradine Balloon [facility]   8/11/2021 10:45 AM Geradine Balloon [facility]   8/18/2021 10:45 AM Geradine Balloon [facility]   8/20/2021 12:00 PM Geradine Balloon [facility]

## 2021-07-28 ENCOUNTER — HOSPITAL ENCOUNTER (OUTPATIENT)
Dept: PHYSICAL THERAPY | Age: 81
Discharge: HOME | End: 2021-07-28
Payer: MEDICARE

## 2021-07-28 PROCEDURE — 97112 NEUROMUSCULAR REEDUCATION: CPT

## 2021-07-28 NOTE — PROGRESS NOTES
PHYSICAL THERAPY - DAILY TREATMENT NOTE    Patient Name: Allison Ruiz        Date: 2021  : 1940    Patient  Verified: YES  Visit #:   5   of   12  Insurance: Payor: Steph De La O / Plan: [place] MEDICARE PART A & B / Product Type: Medicare /      In time: 11:00 Out time: 11:30   Total Treatment Time: 30     Medicare Time Tracking (below)   Total Timed Codes (min):  30 1:1 Treatment Time:  30     TREATMENT AREA/ DIAGNOSIS = Other abnormalities of gait and mobility [R26.89]    SUBJECTIVE  Pain Level (on 0 to 10 scale):  0  / 10   Medication Changes/New allergies or changes in medical history, any new surgeries or procedures? NO    If yes, update Summary List   Subjective Functional Status/Changes:  []  No changes reported     Pt reports difficulty with her balance and walking      OBJECTIVE    30 min Neuromuscular Re-ed: [x]  See flow sheet   Rationale:    improve coordination, improve balance and increase proprioception to improve the patients ability to perform ADLs without pain    Billed With/As:   [] TE   [] TA   [x] Neuro   [] Self Care Patient Education: [x] Review HEP    [] Progressed/Changed HEP based on:   [] positioning   [] body mechanics   [] transfers   [] heat/ice application    [] other:        Other Objective/Functional Measures:    Increased sway with static and dynamic balance activities    Post Treatment Pain Level (on 0 to 10) scale:   0  / 10     ASSESSMENT  Assessment/Changes in Function:     Pt showing slight improvements in overall balance. Still requires supervision with balance exercises due to increased sway with activities      []  See Progress Note/Recertification   Patient will continue to benefit from skilled PT services to modify and progress therapeutic interventions, address functional mobility deficits and address ROM deficits to attain remaining goals.    Progress toward goals / Updated goals:    Fair Progress to    [] STG    [x] LTG  1 as shown by still having balance issues with ADLs     PLAN  [x]  Upgrade activities as tolerated YES Continue plan of care   []  Discharge due to :    []  Other:      Therapist: Luis F Nugent DPT     Date: 7/28/2021 Time: 10:21 AM        Future Appointments   Date Time Provider Jim Pendleton   7/28/2021 10:45 AM Errol Saltness [facility]   8/2/2021 10:45 AM Errol Saltness [facility]   8/4/2021 10:45 AM Errol Saltness [facility]   8/9/2021 10:45 AM Errol Saltness [facility]   8/11/2021 10:45 AM Errol Saltness [facility]   8/18/2021 10:45 AM Errol Saltness [facility]   8/20/2021 12:00 PM Errol Saltness [facility]

## 2021-08-02 ENCOUNTER — HOSPITAL ENCOUNTER (OUTPATIENT)
Dept: PHYSICAL THERAPY | Age: 81
Discharge: HOME | End: 2021-08-02
Payer: MEDICARE

## 2021-08-02 PROCEDURE — 97112 NEUROMUSCULAR REEDUCATION: CPT

## 2021-08-02 NOTE — PROGRESS NOTES
PHYSICAL THERAPY - DAILY TREATMENT NOTE    Patient Name: Alana Chong        Date: 2021  : 1940    Patient  Verified: YES  Visit #:   6   of   12  Insurance: Payor: Dave Conklin / Plan: [place] MEDICARE PART A & B / Product Type: Medicare /      In time: 10:45 Out time: 11:30   Total Treatment Time: 45     Medicare Time Tracking (below)   Total Timed Codes (min):  45 1:1 Treatment Time:  45     TREATMENT AREA/ DIAGNOSIS = Other abnormalities of gait and mobility [R26.89]    SUBJECTIVE  Pain Level (on 0 to 10 scale):  0  / 10   Medication Changes/New allergies or changes in medical history, any new surgeries or procedures? NO    If yes, update Summary List   Subjective Functional Status/Changes:  []  No changes reported     Pt reports she is still having difficulty with her balance. Pt states her back is feeling pretty good today     OBJECTIVE    45 min Neuromuscular Re-ed: [x]  See flow sheet   Rationale:    improve coordination and improve balance to improve the patients ability to perform ADLs without pain    Billed With/As:   [x] TE   [] TA   [] Neuro   [] Self Care Patient Education: [x] Review HEP    [] Progressed/Changed HEP based on:   [] positioning   [] body mechanics   [] transfers   [] heat/ice application    [] other:        Other Objective/Functional Measures:    Improved static balance. Post Treatment Pain Level (on 0 to 10) scale:   0  / 10     ASSESSMENT  Assessment/Changes in Function:     Pt showing improved overall balance. Pt still having increased sway with eyes closed balance activities. []  See Progress Note/Recertification   Patient will continue to benefit from skilled PT services to modify and progress therapeutic interventions, address functional mobility deficits, address ROM deficits, address strength deficits, analyze and address soft tissue restrictions, assess and modify postural abnormalities and address imbalance/dizziness to attain remaining goals.    Progress toward goals / Updated goals:    Fair Progress to    [] STG    [x] LTG  1 as shown by improved balance needed for safe ADLs     PLAN  [x]  Upgrade activities as tolerated YES Continue plan of care   []  Discharge due to :    []  Other:      Therapist: Ilene Terrell DPT     Date: 8/2/2021 Time: 12:30 PM        Future Appointments   Date Time Provider Jim Pendleton   8/4/2021 10:45 AM Aleene Kins [facility]   8/9/2021 10:45 AM Aleene Kins [facility]   8/11/2021 10:45 AM Aleene Kins [facility]   8/18/2021 10:45 AM Aleene Kins [facility]   8/20/2021 12:00 PM Aleene Kins [facility]

## 2021-08-04 ENCOUNTER — HOSPITAL ENCOUNTER (OUTPATIENT)
Dept: PHYSICAL THERAPY | Age: 81
Discharge: HOME | End: 2021-08-04
Payer: MEDICARE

## 2021-08-04 PROCEDURE — 97112 NEUROMUSCULAR REEDUCATION: CPT

## 2021-08-04 NOTE — PROGRESS NOTES
[facility] PHYSICAL THERAPY AT [facility]. [place], [address]  Phone: [phone]  Fax: [phone] [address]          Patient Name: Rachna Decker : 1940   Treatment/Medical Diagnosis: Other abnormalities of gait and mobility [R26.89]   Onset Date: Chronic     Referral Source: Mart Sapp MD Start of Care [facility]): 2021   Prior Hospitalization: See Medical History Provider #: [id number]   Prior Level of Function: Pt has had chronic balance difficulties over the past few years    Comorbidities: Pt reports thyroid problems and high blood pressure    Medications: Verified on Patient Summary List   Visits from [facility]: 7 Missed Visits: -     Goal/Measure of Progress Goal Met? 1. Increase score on FOTO to > or = 65 to demo an increase in functional activity tolerance   Status at last Eval: 56 Current Status: 65 yes   2. Pt will note < or = 2/10 pain with all mobility to improve comfort with ADLs. Status at last Eval: 0 Current Status: discontinue Discontinue   3. Pt will demonstrate a GROC score of >/= +5 to show overall improvement in function   Status at last Eval: NA Current Status: DNA n/a     4. Pt will demonstrate a FGA score of >/= 23 to show overall improvement in balance   Status at last Eval: 15 Current Status: 20 Progressing       Key Functional Changes/Progress: Pt has shown good overall improvement since onset of care. Pt showing good overall improvement in static balance. Pt still having difficulty with dynamic balance activities. Chowdhury balance score: 49/56 (was 45/56). FGA: 20/30 (was 15/30).  Pt still requires HHA with ascending and descending stairs,   Problem List: decrease strength, impaired gait/ balance, decrease ADL/ functional abilitiies and decrease activity tolerance   Treatment Plan may include any combination of the following: Therapeutic exercise, Therapeutic activities, Neuromuscular re-education, Physical agent/modality, Gait/balance training, Manual therapy, Patient education, Self Care training, Functional mobility training and Home safety training  Patient Goal(s) has been updated and includes:      Goals for this certification period include and are to be achieved in   4  weeks:  Continue unmet goals above   Frequency / Duration:   Patient to be seen   2   times per week for   4    weeks:    Assessments/Recommendations: Pt to continue PT 2x a week for 4 more weeks to improve overall balance and improve safety with ADLs   If you have any questions/comments please contact us directly at [phone]. Thank you for allowing us to assist in the care of your patient. Therapist Signature: Violeta John Date: 2/9/3684   Certification Period:  Reporting Period: 8/4/2021-11/4/2021 7/6/2021-8/4/2021 Time: 10:25 AM   NOTE TO PHYSICIAN:  PLEASE COMPLETE THE ORDERS BELOW AND FAX TO   [facility] at [address]: [phone]. If you are unable to process this request in 24 hours please contact our office: [phone].    ___ I have read the above report and request that my patient continue as recommended.   ___ I have read the above report and request that my patient continue therapy with the following changes/special instructions: ________________________________________________   ___ I have read the above report and request that my patient be discharged from therapy.      Physician Signature:        Date:       Time:    Kobi Sousa MD.

## 2021-08-04 NOTE — PROGRESS NOTES
PHYSICAL THERAPY - DAILY TREATMENT NOTE    Patient Name: Juanito Burrell        Date: 2021  : 1940    Patient  Verified: YES  Visit #:      12  Insurance: Payor: Binamario Ferrari / Plan: [place] MEDICARE PART A & B / Product Type: Medicare /      In time: 10:50 Out time: 11:30   Total Treatment Time: 40     Medicare Time Tracking (below)   Total Timed Codes (min):  40 1:1 Treatment Time:  40     TREATMENT AREA/ DIAGNOSIS = Other abnormalities of gait and mobility [R26.89]    SUBJECTIVE  Pain Level (on 0 to 10 scale):  0  / 10   Medication Changes/New allergies or changes in medical history, any new surgeries or procedures? NO    If yes, update Summary List   Subjective Functional Status/Changes:  []  No changes reported     See Recert      OBJECTIVE    40 min Neuromuscular Re-ed: [x]  See flow sheet   Rationale:    improve coordination, improve balance and increase proprioception to improve the patients ability to perform ADLs without pain      Billed With/As:   [x] TE   [] TA   [] Neuro   [] Self Care Patient Education: [x] Review HEP    [] Progressed/Changed HEP based on:   [] positioning   [] body mechanics   [] transfers   [] heat/ice application    [] other:        Other Objective/Functional Measures:  See Recert   Post Treatment Pain Level (on 0 to 10) scale:   0  / 10     ASSESSMENT  Assessment/Changes in Function:     See Recert     []  See Progress Note/Recertification   Patient will continue to benefit from skilled PT services to modify and progress therapeutic interventions, address functional mobility deficits, address ROM deficits, address strength deficits, analyze and address soft tissue restrictions and analyze and cue movement patterns to attain remaining goals.    Progress toward goals / Updated goals:    See Recert     PLAN  [x]  Upgrade activities as tolerated YES Continue plan of care   []  Discharge due to :    []  Other:      Therapist: Kira Lala DPT     Date: 2021 Time: 10:27 AM        Future Appointments   Date Time Provider Department Center   8/4/2021 10:45 AM Geradine Balloon [facility]   8/9/2021 10:45 AM Geradine Balloon [facility]   8/11/2021 10:45 AM Geradine Balloon [facility]   8/18/2021 10:45 AM Geradine Balloon [facility]   8/20/2021 12:00 PM Geradine Balloon [facility]

## 2021-08-09 ENCOUNTER — HOSPITAL ENCOUNTER (OUTPATIENT)
Dept: PHYSICAL THERAPY | Age: 81
Discharge: HOME | End: 2021-08-09
Payer: MEDICARE

## 2021-08-09 PROCEDURE — 97112 NEUROMUSCULAR REEDUCATION: CPT

## 2021-08-09 NOTE — PROGRESS NOTES
PHYSICAL THERAPY - DAILY TREATMENT NOTE    Patient Name: Juanito Burrell        Date: 2021  : 1940    Patient  Verified: YES  Visit #:     Insurance: Payor: Bina Ferrari / Plan: [place] MEDICARE PART A & B / Product Type: Medicare /      In time: 10:45 Out time: 11:20   Total Treatment Time: 35     Medicare Time Tracking (below)   Total Timed Codes (min):  35 1:1 Treatment Time:  35     TREATMENT AREA/ DIAGNOSIS = Other abnormalities of gait and mobility [R26.89]    SUBJECTIVE  Pain Level (on 0 to 10 scale):  0  / 10   Medication Changes/New allergies or changes in medical history, any new surgeries or procedures? NO    If yes, update Summary List   Subjective Functional Status/Changes:  []  No changes reported       Pt reports still having difficulty with dynamic balance tasks such as walking and stairs       OBJECTIVE    35 min Neuromuscular Re-ed: [x]  See flow sheet   Rationale:    improve coordination, improve balance and increase proprioception to improve the patients ability to perform ADLs without pain    Billed With/As:   [x] TE   [] TA   [] Neuro   [] Self Care Patient Education: [x] Review HEP    [] Progressed/Changed HEP based on:   [] positioning   [] body mechanics   [] transfers   [] heat/ice application    [] other:        Other Objective/Functional Measures:    Impaired dynamic balance    Post Treatment Pain Level (on 0 to 10) scale:   0  / 10     ASSESSMENT  Assessment/Changes in Function:     Pt still has difficulty with HHT and VHT walking      []  See Progress Note/Recertification   Patient will continue to benefit from skilled PT services to modify and progress therapeutic interventions, address functional mobility deficits, address ROM deficits, address strength deficits, analyze and address soft tissue restrictions and analyze and modify body mechanics/ergonomics to attain remaining goals.    Progress toward goals / Updated goals:    Good Progress to    [] STG    [x] LTG  1 as shown by improved overall balance since onset of care     PLAN  [x]  Upgrade activities as tolerated YES Continue plan of care   []  Discharge due to :    []  Other:      Therapist: Anahy Wing DPT     Date: 8/9/2021 Time: 11:31 AM        Future Appointments   Date Time Provider Jim Pendleton   8/11/2021 10:45 AM Geraline Arnt [facility]   8/18/2021 10:45 AM Geraline Arnt [facility]

## 2021-08-11 ENCOUNTER — HOSPITAL ENCOUNTER (OUTPATIENT)
Dept: PHYSICAL THERAPY | Age: 81
End: 2021-08-11
Payer: MEDICARE

## 2021-08-18 ENCOUNTER — HOSPITAL ENCOUNTER (OUTPATIENT)
Dept: PHYSICAL THERAPY | Age: 81
Discharge: HOME | End: 2021-08-18
Payer: MEDICARE

## 2021-08-18 PROCEDURE — 97112 NEUROMUSCULAR REEDUCATION: CPT

## 2021-08-18 NOTE — PROGRESS NOTES
PHYSICAL THERAPY - DAILY TREATMENT NOTE    Patient Name: Baudilio Riggins        Date: 2021  : 1940    Patient  Verified: YES  Visit #:     Insurance: Payor: Abdoul Melendrez / Plan: [place] MEDICARE PART A & B / Product Type: Medicare /      In time: 11:00 Out time: 11:35   Total Treatment Time: 35     Medicare Time Tracking (below)   Total Timed Codes (min):  35 1:1 Treatment Time:  35     TREATMENT AREA/ DIAGNOSIS = Other abnormalities of gait and mobility [R26.89]    SUBJECTIVE  Pain Level (on 0 to 10 scale):  0  / 10   Medication Changes/New allergies or changes in medical history, any new surgeries or procedures? NO    If yes, update Summary List   Subjective Functional Status/Changes:  []  No changes reported     Pt reports she is still having balance issues. Pt states that she still has a fear of falling and difficulty with stairs      OBJECTIVE      35 min Neuromuscular Re-ed: [x]  See flow sheet   Rationale:    improve coordination, improve balance and increase proprioception to improve the patients ability to perform ADLs without pain    Billed With/As:   [] TE   [] TA   [x] Neuro   [] Self Care Patient Education: [x] Review HEP    [] Progressed/Changed HEP based on:   [] positioning   [] body mechanics   [] transfers   [] heat/ice application    [] other:        Other Objective/Functional Measures:    Improved sway with static balance activities. Pt still demonstrates significant deviations with dynamic balance activities    Post Treatment Pain Level (on 0 to 10) scale:   0  / 10     ASSESSMENT  Assessment/Changes in Function:     Pt showing good overall improvement with static balance.  Pt still demonstrates decreased safety with dynamic balance tasks     []  See Progress Note/Recertification   Patient will continue to benefit from skilled PT services to modify and progress therapeutic interventions, address functional mobility deficits, address strength deficits and address imbalance/dizziness to attain remaining goals.    Progress toward goals / Updated goals:    Good Progress to    [] STG    [x] LTG  1 as shown by improved overall balance since onset of care     PLAN  [x]  Upgrade activities as tolerated YES Continue plan of care   []  Discharge due to :    []  Other:      Therapist: Kobi Street DPT     Date: 8/18/2021 Time: 11:45 AM        Future Appointments   Date Time Provider Jim Pendleton   8/23/2021 12:15 PM Casey Lennox [facility]   8/25/2021 10:45 AM Casey Lennox [facility]   8/31/2021  2:15 PM Casey Lennox [facility]   9/8/2021 10:45 AM Casey Lennox [facility]   9/13/2021 10:45 AM Casey Lennox [facility]   9/15/2021 10:45 AM Casey Lennox [facility]

## 2021-08-20 ENCOUNTER — APPOINTMENT (OUTPATIENT)
Dept: PHYSICAL THERAPY | Age: 81
End: 2021-08-20
Payer: MEDICARE

## 2021-08-23 ENCOUNTER — APPOINTMENT (OUTPATIENT)
Dept: PHYSICAL THERAPY | Age: 81
End: 2021-08-23
Payer: MEDICARE

## 2021-08-25 ENCOUNTER — HOSPITAL ENCOUNTER (OUTPATIENT)
Dept: PHYSICAL THERAPY | Age: 81
Discharge: HOME | End: 2021-08-25
Payer: MEDICARE

## 2021-08-25 PROCEDURE — 97112 NEUROMUSCULAR REEDUCATION: CPT

## 2021-08-25 PROCEDURE — 97110 THERAPEUTIC EXERCISES: CPT

## 2021-08-25 NOTE — PROGRESS NOTES
PHYSICAL THERAPY - DAILY TREATMENT NOTE    Patient Name: Hilario Ford        Date: 2021  : 1940    Patient  Verified: YES  Visit #:   10   of   16  Insurance: Payor: [place] / Plan: [place] MEDICARE PART A & B / Product Type: Medicare /      In time: 10:45 Out time: 11:25   Total Treatment Time: 40     Medicare Time Tracking (below)   Total Timed Codes (min):  40 1:1 Treatment Time:  40     TREATMENT AREA/ DIAGNOSIS = Other abnormalities of gait and mobility [R26.89]    SUBJECTIVE  Pain Level (on 0 to 10 scale):  0  / 10   Medication Changes/New allergies or changes in medical history, any new surgeries or procedures? NO    If yes, update Summary List   Subjective Functional Status/Changes:  []  No changes reported     Pt reports that her walking has been off. Pt feels better with ADLs that require standing still      OBJECTIVE      10 min Therapeutic Exercise:  [x]  See flow sheet   Rationale:      increase ROM and increase strength to improve the patients ability to perform ADLs without pain         30 min Neuromuscular Re-ed: [x]  See flow sheet   Rationale:    improve coordination, improve balance and increase proprioception to improve the patients ability to perform ADLs without pain    Billed With/As:   [] TE   [] TA   [x] Neuro   [] Self Care Patient Education: [x] Review HEP    [] Progressed/Changed HEP based on:   [] positioning   [] body mechanics   [] transfers   [] heat/ice application    [] other:        Other Objective/Functional Measures:    Increased sway with EC static balance.    Improved overall static balance with EO   Post Treatment Pain Level (on 0 to 10) scale:   0  / 10     ASSESSMENT  Assessment/Changes in Function:     Pt required verbal cueing for visualization with EC walking to improve deviation from straight line     []  See Progress Note/Recertification   Patient will continue to benefit from skilled PT services to modify and progress therapeutic interventions, address functional mobility deficits, address ROM deficits, address strength deficits, analyze and address soft tissue restrictions and analyze and cue movement patterns to attain remaining goals.    Progress toward goals / Updated goals:    Good Progress to    [] STG    [x] LTG  1 as shown by improved overall static balance      PLAN  [x]  Upgrade activities as tolerated YES Continue plan of care   []  Discharge due to :    []  Other:      Therapist: Violeta John DPT     Date: 8/25/2021 Time: 10:40 AM        Future Appointments   Date Time Provider Jim Pendleton   8/25/2021 10:45 AM Katherin Seltzer [facility]   8/31/2021  2:15 PM Katherin Seltzer [facility]   9/8/2021 10:45 AM Katherin Seltzer [facility]   9/13/2021 10:45 AM Katherin Seltzer [facility]   9/15/2021 10:45 AM Katherin Seltzer [facility]

## 2021-08-31 ENCOUNTER — HOSPITAL ENCOUNTER (OUTPATIENT)
Dept: PHYSICAL THERAPY | Age: 81
Discharge: HOME | End: 2021-08-31
Payer: MEDICARE

## 2021-08-31 PROCEDURE — 97112 NEUROMUSCULAR REEDUCATION: CPT

## 2021-08-31 PROCEDURE — 97110 THERAPEUTIC EXERCISES: CPT

## 2021-08-31 NOTE — PROGRESS NOTES
PHYSICAL THERAPY - DAILY TREATMENT NOTE    Patient Name: Sandhya Wing        Date: 2021  : 1940    Patient  Verified: YES  Visit #:      16  Insurance: Payor: Lidia Drew / Plan: [place] MEDICARE PART A & B / Product Type: Medicare /      In time: 2:25 Out time: 3:05   Total Treatment Time: 40     Medicare Time Tracking (below)   Total Timed Codes (min):  40 1:1 Treatment Time:  40     TREATMENT AREA/ DIAGNOSIS = Other abnormalities of gait and mobility [R26.89]    SUBJECTIVE  Pain Level (on 0 to 10 scale):  0  / 10   Medication Changes/New allergies or changes in medical history, any new surgeries or procedures? NO    If yes, update Summary List   Subjective Functional Status/Changes:  []  No changes reported     Pt reports that she hasnt had any falls and feels the balance is doing pretty good      OBJECTIVE  \    10 min Therapeutic Exercise:  [x]  See flow sheet   Rationale:      increase strength and improve coordination to improve the patients ability to perform ADLs without pain       30 min Neuromuscular Re-ed: [x]  See flow sheet   Rationale:    improve coordination and increase proprioception to improve the patients ability to perform ADLs without pain    Billed With/As:   [x] TE   [] TA   [] Neuro   [] Self Care Patient Education: [x] Review HEP    [] Progressed/Changed HEP based on:   [] positioning   [] body mechanics   [] transfers   [] heat/ice application    [] other:        Other Objective/Functional Measures:    Pt demonstrates decreased balance with eyes closed. Pt showed improved dynamic balance with eyes closed today    Post Treatment Pain Level (on 0 to 10) scale:   0  / 10     ASSESSMENT  Assessment/Changes in Function:     Pt showing good response to treatment based on improved overall balance.  Pt had increased difficulty with standing on 2x4      []  See Progress Note/Recertification   Patient will continue to benefit from skilled PT services to modify and progress therapeutic interventions, address functional mobility deficits, address ROM deficits, address strength deficits, analyze and address soft tissue restrictions and analyze and cue movement patterns to attain remaining goals.    Progress toward goals / Updated goals:    Good Progress to    [] STG    [x] LTG  1 as shown by improved overall balance needed for improved safety with ADLs     PLAN  [x]  Upgrade activities as tolerated YES Continue plan of care   []  Discharge due to :    []  Other:      Therapist: Coby Castellanos DPT     Date: 8/31/2021 Time: 2:42 PM        Future Appointments   Date Time Provider Jim Pendleton   9/8/2021 10:45 AM Estrellita Robin [facility]   9/13/2021 10:45 AM Estrellita Robin [facility]   9/15/2021 10:45 AM Estrellita Robin [facility]

## 2021-09-08 ENCOUNTER — HOSPITAL ENCOUNTER (OUTPATIENT)
Dept: PHYSICAL THERAPY | Age: 81
Discharge: HOME | End: 2021-09-08
Payer: MEDICARE

## 2021-09-08 PROCEDURE — 97110 THERAPEUTIC EXERCISES: CPT

## 2021-09-08 PROCEDURE — 97112 NEUROMUSCULAR REEDUCATION: CPT

## 2021-09-08 NOTE — PROGRESS NOTES
[address] [facility] PHYSICAL THERAPY AT [facility] [address], [address]  Phone: [phone]  Fax: [phone] OF [address] [place] [address]          Patient Name: Julio Cesar Chadwick : 1940   Treatment/Medical Diagnosis: Other abnormalities of gait and mobility [R26.89]   Onset Date: Chronic     Referral Source: Reyna Hernández MD [facility]): 2021   Prior Hospitalization: See Medical History Provider #: [id number]   Prior Level of Function: Pt has had chronic balance difficulties over the past few years with ADLs   Comorbidities: Pt reports thyroid problem and high blood pressure   Medications: Verified on Patient Summary List   Visits from [facility]: 12 Missed Visits: -     Goal/Measure of Progress Goal Met? 1.  score on FOTO to > or = 65 to demo an increase in functional activity tolerance   Status at last Eval: 65 Current Status: 65 yes   2. Pt will demonstrate a GROC score of >/= +5 to show overall improvement in function   Status at last Eval: NA Current Status: DNA n/a   3. Pt will demonstrate a FGA score of >/= 23 to show overall improvement in balance   Status at last Eval: 15,20 Current Status: 19 no     Key Functional Changes/Progress: Pt still demonstrating decreased dynamic balance. Pt able to perform static balance exercises much better. Pt tends to have dynamic balance issues when trying to perform tasks to quickly or with dual task environments.  Pt has been educated on ways to improve the dynamic balance by taking her time to improve safety  Problem List: pain affecting function, decrease ROM, decrease strength, impaired gait/ balance and decrease flexibility/ joint mobility   Treatment Plan may include any combination of the following: Therapeutic exercise, Therapeutic activities, Neuromuscular re-education, Physical agent/modality, Gait/balance training, Manual therapy, Patient education, Self Care training and Functional mobility training  Patient Goal(s) has been updated and includes:      Goals for this certification period include and are to be achieved in   2  treatments:  Continue goals above  Frequency / Duration:   Patient to be seen   2   times per week for   1    weeks:    Assessments/Recommendations: Pt to finish scheduled appointments and discharge to self management. If you have any questions/comments please contact us directly at [phone]. Thank you for allowing us to assist in the care of your patient. Therapist Signature: Law Stanley Date: 4/2/5274   Certification Period:  Reporting Period: 9/8/2021-12/8/2021 8/4/2021-9/8/2021 Time: 10:51 AM   NOTE TO PHYSICIAN:  PLEASE COMPLETE THE ORDERS BELOW AND FAX TO   [facility] [place]: [phone]. If you are unable to process this request in 24 hours please contact our office: [phone].    ___ I have read the above report and request that my patient continue as recommended.   ___ I have read the above report and request that my patient continue therapy with the following changes/special instructions: ________________________________________________   ___ I have read the above report and request that my patient be discharged from therapy.      Physician Signature:        Date:       Time:    Chaparro Maher MD.

## 2021-09-08 NOTE — PROGRESS NOTES
PHYSICAL THERAPY - DAILY TREATMENT NOTE    Patient Name: Gerson Bachelor        Date: 2021  : 1940    Patient  Verified: YES  Visit #:     Insurance: Payor: Neno Mccarty / Plan: VA MEDICARE PART A & B / Product Type: Medicare /      In time: 10:50 Out time: 11:30   Total Treatment Time: 40     Medicare Time Tracking (below)   Total Timed Codes (min):  40 1:1 Treatment Time:  40     TREATMENT AREA/ DIAGNOSIS = Other abnormalities of gait and mobility [R26.89]    SUBJECTIVE   Pain Level (on 0 to 10 scale):  0  / 10   Medication Changes/New allergies or changes in medical history, any new surgeries or procedures? NO    If yes, update Summary List   Subjective Functional Status/Changes:  []  No changes reported     See Recert      OBJECTIVE      10 min Therapeutic Exercise:  [x]  See flow sheet   Rationale:      increase ROM and increase strength to improve the patients ability to perform ADLs without pain     30 min Neuromuscular Re-ed: [x]  See flow sheet   Rationale:    improve coordination, improve balance and increase proprioception to improve the patients ability to perform ADLs without pain    Billed With/As:   [x] TE   [] TA   [] Neuro   [] Self Care Patient Education: [x] Review HEP    [] Progressed/Changed HEP based on:   [] positioning   [] body mechanics   [] transfers   [] heat/ice application    [] other:        Other Objective/Functional Measures:    See Recert   Post Treatment Pain Level (on 0 to 10) scale:   0  / 10     ASSESSMENT  Assessment/Changes in Function:     See recert     []  See Progress Note/Recertification   Patient will continue to benefit from skilled PT services to modify and progress therapeutic interventions, address functional mobility deficits, address ROM deficits, address strength deficits, analyze and address soft tissue restrictions, analyze and cue movement patterns and analyze and modify body mechanics/ergonomics to attain remaining goals. Progress toward goals / Updated goals:    See Recert     PLAN  [x]  Upgrade activities as tolerated YES Continue plan of care   []  Discharge due to :    []  Other:      Therapist: Luis F Nugent DPT     Date: 9/8/2021 Time: 10:52 AM        Future Appointments   Date Time Provider Jim Pendleton   9/13/2021 10:45 AM Errol Saltness [facility]   9/15/2021 10:45 AM Errol Saltness [facility]

## 2021-09-13 ENCOUNTER — APPOINTMENT (OUTPATIENT)
Dept: PHYSICAL THERAPY | Age: 81
End: 2021-09-13
Payer: MEDICARE

## 2021-09-15 ENCOUNTER — HOSPITAL ENCOUNTER (OUTPATIENT)
Dept: PHYSICAL THERAPY | Age: 81
Discharge: HOME | End: 2021-09-15
Payer: MEDICARE

## 2021-09-15 PROCEDURE — 97112 NEUROMUSCULAR REEDUCATION: CPT

## 2021-09-15 PROCEDURE — 97110 THERAPEUTIC EXERCISES: CPT

## 2021-09-17 ENCOUNTER — HOSPITAL ENCOUNTER (OUTPATIENT)
Dept: PHYSICAL THERAPY | Age: 81
Discharge: HOME | End: 2021-09-17
Payer: MEDICARE

## 2021-09-17 PROCEDURE — 97535 SELF CARE MNGMENT TRAINING: CPT

## 2021-09-17 PROCEDURE — 97110 THERAPEUTIC EXERCISES: CPT

## 2021-09-17 PROCEDURE — 97112 NEUROMUSCULAR REEDUCATION: CPT

## 2021-09-17 NOTE — PROGRESS NOTES
PHYSICAL THERAPY - DAILY TREATMENT NOTE    Patient Name: Danna Winter        Date: 2021  : 1940    Patient  Verified: YES  Visit #:   15   of   14  Insurance: Payor: Yane Grider / Plan: VA MEDICARE PART A & B / Product Type: Medicare /      In time: 12:55 Out time: 1:40   Total Treatment Time: 45     Medicare Time Tracking (below)   Total Timed Codes (min):  45 1:1 Treatment Time:  45     TREATMENT AREA/ DIAGNOSIS = Other abnormalities of gait and mobility [R26.89]    SUBJECTIVE   Pain Level (on 0 to 10 scale):  0  / 10   Medication Changes/New allergies or changes in medical history, any new surgeries or procedures? NO    If yes, update Summary List   Subjective Functional Status/Changes:  []  No changes reported     Pt reports she is doing much better with her balance. Pt states she hasnt had any falls      OBJECTIVE      10 min Therapeutic Exercise:  [x]  See flow sheet   Rationale:      increase ROM and increase strength to improve the patients ability to perform ADLs without pain     25 min Neuromuscular Re-ed: [x]  See flow sheet   Rationale:    improve coordination, improve balance and increase proprioception to improve the patients ability to perform ADLs without pain    10 min Self Care: Education on HEP and post PT management of symptoms    Rationale:    education to improve the patients ability to self manage symptoms     Billed With/As:   [x] TE   [] TA   [] Neuro   [] Self Care Patient Education: [x] Review HEP    [] Progressed/Changed HEP based on:   [] positioning   [] body mechanics   [] transfers   [] heat/ice application    [] other:        Other Objective/Functional Measures:    Pt showing improved overall balance    Post Treatment Pain Level (on 0 to 10) scale:   0  / 10     ASSESSMENT  Assessment/Changes in Function:     Good overall balance. Pt to continue program after discharge.  Pt also going to continue with program here for post rehab exercise program     []  See Progress Note/Recertification      Progress toward goals / Updated goals:    Met FOTO goal     PLAN  [x]  Upgrade activities as tolerated YES Continue plan of care   []  Discharge due to :    []  Other:      Therapist: Anahy Wing DPT     Date: 9/17/2021 Time: 1:51 PM        No future appointments.

## 2021-09-17 NOTE — PROGRESS NOTES
[address] [facility] PHYSICAL THERAPY AT [facility] [address]  Phone: [phone]  Fax: [phone]  DISCHARGE SUMMARY FOR PHYSICAL THERAPY          Patient Name: Maria E Prior : 1940   Treatment/Medical Diagnosis: Low back pain [M54.5]   Onset Date: 2020    Referral Source: Laly Gallegos MD Start of Care [facility]): 2021   Prior Hospitalization: See Medical History Provider #: [id number]   Prior Level of Function: Pt was pain free with ADLs   Comorbidities: Pt reports high blood pressure and thyroid problems    Medications: Verified on Patient Summary List   Vis[facility]: 18 Missed Visits: -     Goal/Measure of Progress Goal Met? 1.  Increase score on FOTO to > or = 60 to demo an increase in functional activity tolerance   Status at last Eval: 54 Current Status: 60 yes   2.  Pt will note < or = 2/10 pain with all mobility to improve comfort with ADLs. Status at last Eval: 0-4 Current Status: 0 yes   3.  Pt will demonstrate a GROC score of >/= +5 to show overall improvement in function   Status at last Eval: +2 Current Status: DNA na      Key Functional Changes/Progress: Pt showing great improvement since onset of care. Pt having minimal to no pain with ADLs. Pt has improved overall activity tolerance. Pt independent with HEP    Assessments/Recommendations: Discontinue therapy. Progressing towards or have reached established goals. If you have any questions/comments please contact us directly at [phone]. Thank you for allowing us to assist in the care of your patient.     Therapist Signature: Go Clarke Date: 2021   Reporting Period: 2021-2021 Time: 12:45 PM

## 2021-09-24 NOTE — PROGRESS NOTES
[address] [facility] PHYSICAL THERAPY AT [facility] [address], [address]  Phone: [phone]  Fax: [phone]  DISCHARGE SUMMARY FOR PHYSICAL THERAPY          Patient Name: Maria E Prior : 1940   Treatment/Medical Diagnosis: Other abnormalities of gait and mobility [R26.89]   Onset Date: Chronic    Referral Source: Laly Gallegos MD [facility] 2021   Prior Hospitalization: See Medical History Provider #: [id number]   Prior Level of Function: Pt has had chronic balance difficulties over the past few years with ADLs   Comorbidities: Pt reports thyroid problem and high blood pressure   Medications: Verified on Patient Summary List   Visits from [facility]: 14 Missed Visits: -     Goal/Measure of Progress Goal Met? 1.  score on FOTO to > or = 65 to demo an increase in functional activity tolerance   Status at last Eval: 65 Current Status: 65 yes   2. Pt will demonstrate a GROC score of >/= +5 to show overall improvement in function   Status at last Eval: NA Current Status: DNA n/a   3. Pt will demonstrate a FGA score of >/= 23 to show overall improvement in balance   Status at last Eval: 15,20 Current Status: 19 no       Key Functional Changes/Progress: Pt showing good overall progress. Pt still has some difficulty with dynamic balance. Pt is independent with HEP and current balance program. Pt is going to enroll in our post rehab program so she can continue to come use our facility independently to perform her HEP about 2-3x a week. Assessments/Recommendations: Discontinue therapy. Progressing towards or have reached established goals. If you have any questions/comments please contact us directly at [phone]. Thank you for allowing us to assist in the care of your patient.     Therapist Signature: Go Clarke Date: 2021   Reporting Period: 2021-2021 Time: 1:49 PM

## 2022-02-09 NOTE — PROGRESS NOTES
UlBryce Luikiavelino [place] [facility] PHYSICAL THERAPY AT [address] [facility], [address] [place] [address], [address] [place] [address], [address] [place] [address]  Phone: [phone]  Fax: [phone] DISCHARGE SUMMARY FOR PHYSICAL THERAPY Patient Name: Jenny Blizzard : 1940 Treatment/Medical Diagnosis: Unilateral primary osteoarthritis, right knee [M17.11] Presence of left artificial knee joint [Z96.652] Onset Date: Chronic Referral Source: Sondra Harden [facility]): 18 Prior Hospitalization: See Medical History Provider #: [id number] Prior Level of Function: Chronic R knee OA, L TKR (Dec 2016) Comorbidities: None Medications: Verified on Patient Summary List  
Visits from [facility]: 16 Missed Visits: 7 Goals from last PN performed 8/15/18: 
 
Goal/Measure of Progress Goal Met? 1. Increase balance, as indicated by SLS of > 10 sec  R Status at last Eval: < 7 sec L and R Current Status: < 10 sec L and R  progressing 2. All ADLs without R knee pain > 1/10 Status at last Eval: R knee pain up to 2/10 Current Status: R knee pain < =3/10 progressing 3. Full R knee ext actively Status at last Eval: -5 degrees of R kne active ext Current Status: Full R active knee ext Yes  
  
Key Functional Changes/Progress: Pt was last seen in the clinic 18 and will be discharged at this time due to cancelling remaining appointments. G-Codes (GP): Mobility [id number] Goal CI=1-19%  [id number] Discharge CJ=20-39% Assessments/Recommendations: Discontinue therapy due to lack of attendance or compliance. If you have any questions/comments please contact us directly at [phone]. Thank you for allowing us to assist in the care of your patient. Therapist Signature: Belinda Corrales PTA Date: 10/11/18 Reporting Period: 18-18 Time: 6:04 PM  
 
 
 [FreeTextEntry3] : I, Trentonbrandy Durand solely acted as scribe for Dr. Yasir Mcneil on 02/09/2022.\par All medical entries made by the Scribe were at my, Dr. Mcneil’s, direction and personally\par dictated by me on 02/09/2022.  I have reviewed the chart and agree that the record\par accurately reflects my personal performance of the history, physical exam, assessment and plan. I\par have also personally directed, reviewed, and agreed with the chart.

## 2022-10-26 ENCOUNTER — HOSPITAL ENCOUNTER (OUTPATIENT)
Dept: PHYSICAL THERAPY | Age: 82
Discharge: HOME | End: 2022-10-26
Payer: MEDICARE

## 2022-10-26 PROCEDURE — 97161 PT EVAL LOW COMPLEX 20 MIN: CPT

## 2022-10-26 PROCEDURE — 97535 SELF CARE MNGMENT TRAINING: CPT

## 2022-10-26 NOTE — PROGRESS NOTES
PHYSICAL THERAPY - DAILY TREATMENT NOTE    Patient Name: Lei Phelps        Date: 10/26/2022  : 1940    Patient  Verified: YES  Visit #:      12  Insurance: Payor: Eugenia Sahu / Plan[place] / Product Type: Medicare /      In time: 3:20 Out time: 4:00   Total Treatment Time: 40     Medicare Time Tracking (below)   Total Timed Codes (min):  10 1:1 Treatment Time:  10     TREATMENT AREA/ DIAGNOSIS = Other abnormalities of gait and mobility [R26.89]    SUBJECTIVE   Pain Level (on 0 to 10 scale):  3  / 10   Medication Changes/New allergies or changes in medical history, any new surgeries or procedures? NO    If yes, update Summary List   Subjective Functional Status/Changes:  []  No changes reported     See Eval      OBJECTIVE    10 min Self Care: Education on pathology, progress for TKA, balance training   Rationale:     education  to improve the patients ability to self manage symptoms    Billed With/As:   [x] TE   [] TA   [] Neuro   [] Self Care Patient Education: [x] Review HEP    [] Progressed/Changed HEP based on:   [] positioning   [] body mechanics   [] transfers   [] heat/ice application    [] other:        Other Objective/Functional Measures:    See Eval   Post Treatment Pain Level (on 0 to 10) scale:   3  / 10     ASSESSMENT  Assessment/Changes in Function:     See Eval     []  See Progress Note/Recertification   Patient will continue to benefit from skilled PT services to modify and progress therapeutic interventions, address functional mobility deficits, address ROM deficits, address strength deficits, analyze and address soft tissue restrictions, and analyze and cue movement patterns to attain remaining goals.    Progress toward goals / Updated goals:    See Eval     PLAN  [x]  Upgrade activities as tolerated YES Continue plan of care   []  Discharge due to :    []  Other:      Therapist: Shayy Norris DPT     Date: 10/26/2022 Time: 4:32 PM        Future Appointments   Date Time Provider Jim Keerthi   10/31/2022  1:35 PM [facility]   11/2/2022 10:55 AM Woody Heller [facility]   11/4/2022 10:25 AM [facility]   11/21/2022  3:15 PM Woody Heller [facility]

## 2022-10-26 NOTE — PROGRESS NOTES
[address] [facility] PHYSICAL THERAPY AT [facility] 93. Amita Barber, [address] - Phone: [phone]  Fax: [phone] / [address] [facility]  Patient Name: Annalise Berrios : 1940   Treatment   Diagnosis: Balance and R knee pain Medical   Diagnosis: Other abnormalities of gait and mobility [R26.89]   Onset Date: Ongoing      Referral Source: Noelle Newman MD [facility]): 10/26/2022   Prior Hospitalization: See medical history Provider #: [id number]   Prior Level of Function: Pt has had chronic R knee pain and balance issues with ADLs   Comorbidities: Pt reports high blood pressure and arthritis. Medications: Verified on Patient Summary List   The Plan of Care and following information is based on the information from the initial evaluation.   ==================================================================================  Assessment / key information: Pt is a [de-identified] yo female that presents to PT with chief complaint of R knee pain and Balance issues. Pt is scheduled to undergo TKA on 2022 on her R knee. Pt states she is still having persistent balance deficits with walking and standing. Pt was seen here for balance PT last years and states that she hasnt dont much of the exercises recently. He/she presents with pain ranging from 3-10/10, located in the R knee. Pain is made worse with walking and standing, better with rest. LE MMT: hip flex 4-/5, abd 4-/5, add 4/5, ext 4/5, knee flex 3+/5, ext 3+/5. SLS EO <3 seconds , EC unable. Pt requires HHA with stairs. Pt showing decreased ability to perform tandem stance. Pt also shows increased sway with walking and doing head turns. Pt is limited in the following activities: walking, stairs, and sit to stands.   Patient scored 66 on FOTO indicating decreased functional activity level and Ana Luisa Slack will benefit from PT interventions to address the aforementioned deficits and allow pt to return to PLOF. Eval Complexity: History LOW Complexity : Zero comorbidities / personal factors that will impact the outcome / POC;  Examination  LOW Complexity : 1-2 Standardized tests and measures addressing body structure, function, activity limitation and / or participation in recreation ; Presentation LOW Complexity : Stable, uncomplicated ;  Decision Making MEDIUM Complexity : FOTO score of 26-74; Overall Complexity LOW    ==================================================================================  Problem List: pain affecting function, decrease ROM, decrease strength, impaired gait/ balance, decrease ADL/ functional abilitiies, decrease activity tolerance, and decrease flexibility/ joint mobility   Treatment Plan may include any combination of the following: Therapeutic exercise, Neuromuscular reeducation, Manual therapy, Therapeutic activity, Self care/home management, Electric stim unattended , and Gait training  Patient / Family readiness to learn indicated by: asking questions, trying to perform skills and interest  Persons(s) to be included in education: patient (P)  Barriers to Learning/Limitations: no  Measures taken, if barriers to learning:    Patient Goal (s): \"Get back to normal after PT\"   Patient self reported health status: good  Rehabilitation Potential: good  Short Term Goals: To be accomplished in  3  weeks:  1. Pt will be independent and compliant with HEP to decrease pain, increase ROM and return pt to PLOF. 2. Pt will demonstrate a GROC score of >/= +2 to show overall improvement in function    Long Term Goals: To be accomplished in  12  weeks:  1. Increase score on FOTO to > or = to 70 points to demo an increase in functional activity tolerance with the LE. 2. Pt will note < or = 2/10 pain with all mobility to improve comfort with ADLs.    3. Pt will demonstrate a GROC score of >/= +5 to show overall improvement in function  Frequency / Duration:   Patient to be seen  1-3  times per week for 2  weeks:  Patient / Caregiver education and instruction: self care, activity modification and exercises  Therapist Signature: Hawk Kim PT Date: 71/60/5311   Certification Period: 10/26/2022-1/25/2023 Time: 4:32 PM   ===========================================================================================  I certify that the above Physical Therapy Services are being furnished while the patient is under my care. I agree with the treatment plan and certify that this therapy is necessary. Physician Signature:        Date:       Time:        Rebeka Gonzalez MD    Please sign and return to [facility] or you may fax the signed copy to [phone]. Thank you.

## 2022-10-31 ENCOUNTER — HOSPITAL ENCOUNTER (OUTPATIENT)
Dept: PHYSICAL THERAPY | Age: 82
Discharge: HOME | End: 2022-10-31
Payer: MEDICARE

## 2022-10-31 PROCEDURE — 97530 THERAPEUTIC ACTIVITIES: CPT

## 2022-10-31 PROCEDURE — 97112 NEUROMUSCULAR REEDUCATION: CPT

## 2022-10-31 PROCEDURE — 97110 THERAPEUTIC EXERCISES: CPT

## 2022-10-31 NOTE — PROGRESS NOTES
PHYSICAL THERAPY - DAILY TREATMENT NOTE    Patient Name: Penelope Umanzor        Date: 10/31/2022  : 1940    Patient  Verified: YES  Visit #:   2   of   6  Insurance: Payor: Elyssa Counter / Plan: [place] MEDICARE PART A & B / Product Type: Medicare /      In time: 1:35 Out time: 2:25   Total Treatment Time: 50     Medicare Time Tracking (below)   Total Timed Codes (min):  40 1:1 Treatment Time:  40     TREATMENT AREA/ DIAGNOSIS = Other abnormalities of gait and mobility [R26.89]    SUBJECTIVE   Pain Level (on 0 to 10 scale):  3  / 10   Medication Changes/New allergies or changes in medical history, any new surgeries or procedures?     NO    If yes, update Summary List   Subjective Functional Status/Changes:  []  No changes reported     Pt reports difficulty with walking and stairs      OBJECTIVE  Modalities Rationale:     decrease inflammation and decrease pain to improve patient's ability to perform ADLs without pain   min [] Estim, type/location:                                      []  att     []  unatt     []  w/US     []  w/ice    []  w/heat    min []  Mechanical Traction: type/lbs                   []  pro   []  sup   []  int   []  cont    []  before manual    []  after manual    min []  Ultrasound, settings/location:      min []  Iontophoresis w/ dexamethasone, location:                                               []  take home patch       []  in clinic   10 min [x]  Ice     []  Heat    location/position: R knee    min []  Vasopneumatic Device, press/temp:    If using vaso (only need to measure limb vaso being performed on)      pre-treatment girth :       post-treatment girth :       measured at (landmark location) :      min []  Other:    [] Skin assessment post-treatment (if applicable):    []  intact    []  redness- no adverse reaction                  []redness - adverse reaction:        15 min Therapeutic Exercise:  [x]  See flow sheet   Rationale:      increase ROM and increase strength to improve the patients ability to perform ADLs without pain       10 min Therapeutic Activity: [x]  See flow sheet   Rationale:     functional activities  to improve the patients ability to perform ADLs without pain      15 min Neuromuscular Re-ed: [x]  See flow sheet   Rationale:    improve balance and increase proprioception to improve the patients ability to perform ADLs without pain    Billed With/As:   [x] TE   [] TA   [] Neuro   [] Self Care Patient Education: [x] Review HEP    [] Progressed/Changed HEP based on:   [] positioning   [] body mechanics   [] transfers   [] heat/ice application    [] other:        Other Objective/Functional Measures:    Pain with standing and stairs. Lacking full ROM   Post Treatment Pain Level (on 0 to 10) scale:   0  / 10     ASSESSMENT  Assessment/Changes in Function:     Pt showing improved overall balance today at the end of treatment sesion. Pt required cueing for proper mechanics with exercises     []  See Progress Note/Recertification   Patient will continue to benefit from skilled PT services to modify and progress therapeutic interventions, address functional mobility deficits, address ROM deficits, address strength deficits, analyze and address soft tissue restrictions, and analyze and cue movement patterns to attain remaining goals.    Progress toward goals / Updated goals:    No progress, just initiated plan of care     PLAN  [x]  Upgrade activities as tolerated YES Continue plan of care   []  Discharge due to :    []  Other:      Therapist: Dakotah Mcgee DPT     Date: 10/31/2022 Time: 1:50 PM        Future Appointments   Date Time Provider Jim Pendleton   11/2/2022 10:55 AM Jamee Hatcher [facility]   11/4/2022 10:25 AM Jamee Hatcher [facility]   11/21/2022  3:15 PM Jamee Hatcher [facility]

## 2022-11-02 ENCOUNTER — HOSPITAL ENCOUNTER (OUTPATIENT)
Dept: PHYSICAL THERAPY | Age: 82
Discharge: HOME | End: 2022-11-02
Payer: MEDICARE

## 2022-11-02 PROCEDURE — 97110 THERAPEUTIC EXERCISES: CPT

## 2022-11-02 PROCEDURE — 97112 NEUROMUSCULAR REEDUCATION: CPT

## 2022-11-02 NOTE — PROGRESS NOTES
PHYSICAL THERAPY - DAILY TREATMENT NOTE    Patient Name: Elver Becker        Date: 2022  : 1940    Patient  Verified: YES  Visit #:   3   of   12  Insurance: Payor: Benson Colunga / Plan: [place] MEDICARE PART A & B / Product Type: Medicare /      In time: 11:05 Out time: 11:45   Total Treatment Time: 40     Medicare Time Tracking (below)   Total Timed Codes (min):  30 1:1 Treatment Time:  30     TREATMENT AREA/ DIAGNOSIS = Other abnormalities of gait and mobility [R26.89]    SUBJECTIVE   Pain Level (on 0 to 10 scale):  0  / 10   Medication Changes/New allergies or changes in medical history, any new surgeries or procedures? NO    If yes, update Summary List   Subjective Functional Status/Changes:  []  No changes reported     Pt reports that she is still feeling off balance.  Pt states she almost fell down the stairs but was able to catch herself      OBJECTIVE  Modalities Rationale:     decrease inflammation and decrease pain to improve patient's ability to perform ADLs without pain   min [] Estim, type/location:                                      []  att     []  unatt     []  w/US     []  w/ice    []  w/heat    min []  Mechanical Traction: type/lbs                   []  pro   []  sup   []  int   []  cont    []  before manual    []  after manual    min []  Ultrasound, settings/location:      min []  Iontophoresis w/ dexamethasone, location:                                               []  take home patch       []  in clinic   10 min [x]  Ice     []  Heat    location/position: R knee    min []  Vasopneumatic Device, press/temp:    If using vaso (only need to measure limb vaso being performed on)      pre-treatment girth :       post-treatment girth :       measured at (landmark location) :      min []  Other:    [] Skin assessment post-treatment (if applicable):    []  intact    []  redness- no adverse reaction                  []redness - adverse reaction:        15 min Therapeutic Exercise:  [x] See flow sheet   Rationale:      increase ROM and increase strength to improve the patients ability to perform ADLs without pain     15 min Neuromuscular Re-ed: [x]  See flow sheet   Rationale:    improve coordination, improve balance, and increase proprioception to improve the patients ability to perform ADLs without pain    Billed With/As:   [x] TE   [] TA   [] Neuro   [] Self Care Patient Education: [x] Review HEP    [] Progressed/Changed HEP based on:   [] positioning   [] body mechanics   [] transfers   [] heat/ice application    [] other:        Other Objective/Functional Measures:    R knee flexion: 105   Post Treatment Pain Level (on 0 to 10) scale:   0  / 10     ASSESSMENT  Assessment/Changes in Function:     Pt showing impaired overall static balance     []  See Progress Note/Recertification   Patient will continue to benefit from skilled PT services to modify and progress therapeutic interventions, address functional mobility deficits, address ROM deficits, address strength deficits, and analyze and cue movement patterns to attain remaining goals.    Progress toward goals / Updated goals:    Good Progress to    [] STG    [x] LTG  1 as shown by improved overall strength needed for ADLs     PLAN  [x]  Upgrade activities as tolerated YES Continue plan of care   []  Discharge due to :    []  Other:      Therapist: Otilio Burnette DPT     Date: 11/2/2022 Time: 11:55 AM        Future Appointments   Date Time Provider Jim Pendleton   11/4/2022 10:25 AM Tommy Florence [facility]   11/21/2022  3:15 PM Tommy Florence [facility]

## 2022-11-04 ENCOUNTER — HOSPITAL ENCOUNTER (OUTPATIENT)
Dept: PHYSICAL THERAPY | Age: 82
Discharge: HOME | End: 2022-11-04
Payer: MEDICARE

## 2022-11-04 PROCEDURE — 97112 NEUROMUSCULAR REEDUCATION: CPT

## 2022-11-04 PROCEDURE — 97110 THERAPEUTIC EXERCISES: CPT

## 2022-11-04 NOTE — PROGRESS NOTES
PHYSICAL THERAPY - DAILY TREATMENT NOTE    Patient Name: Ervin Epps        Date: 2022  : 1940    Patient  Verified: YES  Visit #:   4   of   4  Insurance: Payor: Rosario Gallo / Plan: [place] MEDICARE PART A & B / Product Type: Medicare /      In time: 10:40 Out time: 11:20   Total Treatment Time: 40     Medicare Time Tracking (below)   Total Timed Codes (min):  30 1:1 Treatment Time:  30     TREATMENT AREA/ DIAGNOSIS = Other abnormalities of gait and mobility [R26.89]    SUBJECTIVE   Pain Level (on 0 to 10 scale):  0  / 10   Medication Changes/New allergies or changes in medical history, any new surgeries or procedures? NO    If yes, update Summary List   Subjective Functional Status/Changes:  []  No changes reported     Pt reports difficulty with walking.        OBJECTIVE  Modalities Rationale:     decrease inflammation and decrease pain to improve patient's ability to perform ADLs without pain   min [] Estim, type/location:                                      []  att     []  unatt     []  w/US     []  w/ice    []  w/heat    min []  Mechanical Traction: type/lbs                   []  pro   []  sup   []  int   []  cont    []  before manual    []  after manual    min []  Ultrasound, settings/location:      min []  Iontophoresis w/ dexamethasone, location:                                               []  take home patch       []  in clinic   10 min [x]  Ice     []  Heat    location/position: R shoulder    min []  Vasopneumatic Device, press/temp:    If using vaso (only need to measure limb vaso being performed on)      pre-treatment girth :       post-treatment girth :       measured at (landmark location) :      min []  Other:    [] Skin assessment post-treatment (if applicable):    []  intact    []  redness- no adverse reaction                  []redness - adverse reaction:        15 min Therapeutic Exercise:  [x]  See flow sheet   Rationale:      increase ROM and increase strength to improve the patients ability to perform ADLs without pain     15 min Neuromuscular Re-ed: [x]  See flow sheet   Rationale:    improve coordination and increase proprioception to improve the patients ability to perform ADLs without pain    Billed With/As:   [x] TE   [] TA   [] Neuro   [] Self Care Patient Education: [x] Review HEP    [] Progressed/Changed HEP based on:   [] positioning   [] body mechanics   [] transfers   [] heat/ice application    [] other:        Other Objective/Functional Measures:  AROM flexion: 105d   Post Treatment Pain Level (on 0 to 10) scale:   0  / 10     ASSESSMENT  Assessment/Changes in Function:     Pt showing improved overall mobility      []  See Progress Note/Recertification   Patient will continue to benefit from skilled PT services to modify and progress therapeutic interventions, address functional mobility deficits, address ROM deficits, address strength deficits, analyze and address soft tissue restrictions, analyze and cue movement patterns, and analyze and modify body mechanics/ergonomics to attain remaining goals.    Progress toward goals / Updated goals:    Good Progress to    [] STG    [x] LTG  1 as shown by improved overall strength and balance needed for ADLs     PLAN  [x]  Upgrade activities as tolerated YES Continue plan of care   []  Discharge due to :    []  Other:      Therapist: Massiel Meyers DPT     Date: 11/4/2022 Time: 11:11 AM        Future Appointments   Date Time Provider Jim Pendleton   11/21/2022  3:15 PM [facility]

## 2022-11-16 ENCOUNTER — APPOINTMENT (OUTPATIENT)
Dept: PHYSICAL THERAPY | Age: 82
End: 2022-11-16

## 2022-11-21 ENCOUNTER — HOSPITAL ENCOUNTER (OUTPATIENT)
Dept: PHYSICAL THERAPY | Age: 82
Discharge: HOME | End: 2022-11-21
Payer: MEDICARE

## 2022-11-21 PROCEDURE — 97161 PT EVAL LOW COMPLEX 20 MIN: CPT

## 2022-11-21 PROCEDURE — 97110 THERAPEUTIC EXERCISES: CPT

## 2022-11-21 NOTE — PROGRESS NOTES
[address] [place] [address] PHYSICAL THERAPY AT [facility] [address], [address] - Phone: [phone]  Fax: [phone] / [address] [facility]  Patient Name: Daina Garcia : 1940   Treatment   Diagnosis: R knee pain Medical   Diagnosis: Right knee pain [M25.561]   Onset Date: 2022     Referral Source: EDILBERTO Mari* Start of Care [facility]): 2022   Prior Hospitalization: See medical history Provider #: [id number]   Prior Level of Function: Pt has chronic R knee pain with ADLs    Comorbidities: Pt reports arthritis and high blood pressure   Medications: Verified on Patient Summary List   The Plan of Care and following information is based on the information from the initial evaluation.   ==================================================================================  Assessment / key information: Pt is a [de-identified] yo female s/p R TKA on 2022 . He/she presents with pain ranging from 3-10/10, located in the R knee. Pain is made worse with walking and standing on it., better with rest and ice. Pt has staples covered but overall knee looks good with no signs or symptoms of infection. Pt ambulated into the clinic with front wheeled walker but states that she isnt using it that much. Pt states she is also using the stairs reciprocally but slowly currently. Knee AROM: flexion 76deg, extension -4deg. Pt is limited in the following activities: walking and weightbearing activties. Patient scored 36 on FOTO indicating decreased functional activity level and Natasha Dills will benefit from PT interventions to address the aforementioned deficits and allow pt to return to PLOF.    Eval Complexity: History LOW Complexity : Zero comorbidities / personal factors that will impact the outcome / POC;  Examination  LOW Complexity : 1-2 Standardized tests and measures addressing body structure, function, activity limitation and / or participation in recreation ; Presentation LOW Complexity : Stable, uncomplicated ;  Decision Making LOW Complexity : FOTO score of ; Overall Complexity LOW    ==================================================================================  Problem List: pain affecting function, decrease ROM, decrease strength, impaired gait/ balance, decrease ADL/ functional abilitiies, decrease activity tolerance, and decrease flexibility/ joint mobility   Treatment Plan may include any combination of the following: Therapeutic exercise, Neuromuscular reeducation, Manual therapy, Therapeutic activity, Self care/home management, and Electric stim unattended   Patient / Family readiness to learn indicated by: asking questions, trying to perform skills and interest  Persons(s) to be included in education: patient (P)  Barriers to Learning/Limitations: no  Measures taken, if barriers to learning:    Patient Goal (s): \"To get my knee back to normal\"   Patient self reported health status: good  Rehabilitation Potential: good  Short Term Goals: To be accomplished in  3  weeks:  1. Pt will be independent and compliant with HEP to decrease pain, increase ROM and return pt to PLOF. 2. Pt will demonstrate a GROC score of >/= +2 to show overall improvement in function    Long Term Goals: To be accomplished in  6  weeks:  1. Increase score on FOTO to > or = to 65 points to demo an increase in functional activity tolerance with the LE. 2. Pt will note < or = 2/10 pain with all mobility to improve comfort with ADLs.    3. Pt will demonstrate a GROC score of >/= +5 to show overall improvement in function  Frequency / Duration:   Patient to be seen  1-3  times per week for 6-8  weeks:  Patient / Caregiver education and instruction: self care, activity modification and exercises  Therapist Signature: Boyd Bennett PT Date: 91/82/9186   Certification Period: 11/21/2022-2/20/2023 Time: 6:08 PM ===========================================================================================  I certify that the above Physical Therapy Services are being furnished while the patient is under my care. I agree with the treatment plan and certify that this therapy is necessary. Physician Signature:        Date:       Time:        EDILBERTO Carter*    Please sign and return to [facility] [place] or you may fax the signed copy to [phone]. Thank you.

## 2022-11-21 NOTE — PROGRESS NOTES
PHYSICAL THERAPY - DAILY TREATMENT NOTE    Patient Name: Fatuma Htuchinson        Date: 2022  : 1940    Patient  Verified: YES  Visit #:     Insurance: Payor: Aga Mosley / Plan: [place] MEDICARE PART A & B / Product Type: Medicare /      In time: 3:20 Out time: 4:00   Total Treatment Time: 40     Medicare Time Tracking (below)   Total Timed Codes (min):  35 1:1 Treatment Time:  35     TREATMENT AREA/ DIAGNOSIS = Right knee pain [M25.561]    SUBJECTIVE   Pain Level (on 0 to 10 scale):  3-10  / 10   Medication Changes/New allergies or changes in medical history, any new surgeries or procedures?     NO    If yes, update Summary List   Subjective Functional Status/Changes:  []  No changes reported     See Eval      OBJECTIVE  Modalities Rationale:     decrease inflammation and decrease pain to improve patient's ability to perform ADLs without pain   min [] Estim, type/location:                                      []  att     []  unatt     []  w/US     []  w/ice    []  w/heat    min []  Mechanical Traction: type/lbs                   []  pro   []  sup   []  int   []  cont    []  before manual    []  after manual    min []  Ultrasound, settings/location:      min []  Iontophoresis w/ dexamethasone, location:                                               []  take home patch       []  in clinic   5 min [x]  Ice     []  Heat    location/position:     min []  Vasopneumatic Device, press/temp:    If using vaso (only need to measure limb vaso being performed on)      pre-treatment girth :       post-treatment girth :       measured at (landmark location) :      min []  Other:    [] Skin assessment post-treatment (if applicable):    []  intact    []  redness- no adverse reaction                  []redness - adverse reaction:        10 min Therapeutic Exercise:  [x]  See flow sheet   Rationale:      increase ROM and increase strength to improve the patients ability to perform ADLs without pain     Billed With/As:   [x] TE   [] TA   [] Neuro   [] Self Care Patient Education: [x] Review HEP    [] Progressed/Changed HEP based on:   [] positioning   [] body mechanics   [] transfers   [] heat/ice application    [] other:        Other Objective/Functional Measures:    See Eval   Post Treatment Pain Level (on 0 to 10) scale:   3  / 10     ASSESSMENT  Assessment/Changes in Function:     See Eval     []  See Progress Note/Recertification   Patient will continue to benefit from skilled PT services to modify and progress therapeutic interventions, address functional mobility deficits, address ROM deficits, address strength deficits, analyze and address soft tissue restrictions, analyze and cue movement patterns, and analyze and modify body mechanics/ergonomics to attain remaining goals.    Progress toward goals / Updated goals:    See Eval     PLAN  [x]  Upgrade activities as tolerated YES Continue plan of care   []  Discharge due to :    []  Other:      Therapist: Huan Goldberg DPT     Date: 11/21/2022 Time: 6:08 PM        Future Appointments   Date Time Provider Jim Pendleton   11/28/2022 10:55 AM Roma Skene [facility]   11/30/2022  9:30 AM Roma Skene [facility]   12/2/2022  1:25 PM Roma Skene [facility]   12/5/2022 10:55 AM Roma Skene [facility]   12/7/2022 10:55 AM Roma Skene [facility]   12/9/2022 11:05 AM Roma Skene [facility]   12/12/2022 10:55 AM Roma Skene [facility]   12/14/2022 11:35 AM Roma Skene [facility]   12/16/2022 11:05 AM Roma Skene [facility]   12/19/2022 10:55 AM Roma Skene [facility]   12/21/2022 10:55 AM Roma Skene [facility]   12/23/2022 11:05 AM Roma Skene [facility]   12/27/2022 11:05 AM Roma Skene [facility]   12/28/2022 11:00 AM Roma Skene [facility]   12/30/2022 11:05 AM Roma Skene [facility]

## 2022-11-22 NOTE — PROGRESS NOTES
[address] [place] [address] PHYSICAL THERAPY AT [facility] [address], [address]  Phone: [phone]  Fax: [phone]  DISCHARGE SUMMARY FOR PHYSICAL THERAPY          Patient Name: Annalise Berrios : 1940   Treatment/Medical Diagnosis: Other abnormalities of gait and mobility [R26.89]   Onset Date: ongogin    Referral Source: Noelle Newman MD [facility]): 10/26/2022   Prior Hospitalization: See Medical History Provider #: [id number]   Prior Level of Function: Pt has had chronic R knee pain and balance issues with ADLs   Comorbidities: Pt reports high blood pressure and arthritis. Medications: Verified on Patient Summary List   Visits from [facility]: 4 Missed Visits: 0       Goal/Measure of Progress Goal Met? 1. Increase score on FOTO to > or = to 70 points to demo an increase in functional activity tolerance with the LE. Status at last Eval: 66 Current Status: DNA no   2. Pt will note < or = 2/10 pain with all mobility to improve comfort with ADLs   Status at last Eval: 3 Current Status: 0-3 no   3. Pt will demonstrate a GROC score of >/= +5 to show overall improvement in function   Status at last Eval: NA Current Status: DNA no     Key Functional Changes/Progress: Pt showed some overall improvement with static balance. Pt to undergo R TKA on 2022. Pt to be discharged from therapy. Pt going to return to PT after surgery for new evaluation of the knee. Assessments/Recommendations: Other: Discharge due to having surgery. If you have any questions/comments please contact us directly at [phone]. Thank you for allowing us to assist in the care of your patient.     Therapist Signature: Massiel Meyers Date: 2022   Reporting Period: 10/26/2022-2022 Time: 6:38 AM

## 2022-11-28 ENCOUNTER — HOSPITAL ENCOUNTER (OUTPATIENT)
Dept: PHYSICAL THERAPY | Age: 82
Discharge: HOME | End: 2022-11-28
Payer: MEDICARE

## 2022-11-28 PROCEDURE — 97112 NEUROMUSCULAR REEDUCATION: CPT

## 2022-11-28 PROCEDURE — 97140 MANUAL THERAPY 1/> REGIONS: CPT

## 2022-11-28 PROCEDURE — 97110 THERAPEUTIC EXERCISES: CPT

## 2022-11-28 NOTE — PROGRESS NOTES
PHYSICAL THERAPY - DAILY TREATMENT NOTE    Patient Name: Brianna King        Date: 2022  : 1940    Patient  Verified: YES  Visit #:      of     Insurance: Payor: Rufus Martinez / Plan: [place] MEDICARE PART A & B / Product Type: Medicare /      In time: 11:00 Out time: 11:50   Total Treatment Time: 50     Medicare Time Tracking (below)   Total Timed Codes (min):  40 1:1 Treatment Time:  40     TREATMENT AREA/ DIAGNOSIS = Right knee pain [M25.561]    SUBJECTIVE   Pain Level (on 0 to 10 scale):  3  / 10   Medication Changes/New allergies or changes in medical history, any new surgeries or procedures? NO    If yes, update Summary List   Subjective Functional Status/Changes:  []  No changes reported     Pt reports that she is walking better. Pt states that she is still having difficulty with stairs.  Pt states she cant drive until next week      OBJECTIVE  Modalities Rationale:     decrease inflammation and decrease pain to improve patient's ability to perform ADLs without pain   min [] Estim, type/location:                                      []  att     []  unatt     []  w/US     []  w/ice    []  w/heat    min []  Mechanical Traction: type/lbs                   []  pro   []  sup   []  int   []  cont    []  before manual    []  after manual    min []  Ultrasound, settings/location:      min []  Iontophoresis w/ dexamethasone, location:                                               []  take home patch       []  in clinic   10 min [x]  Ice     []  Heat    location/position:     min []  Vasopneumatic Device, press/temp:    If using vaso (only need to measure limb vaso being performed on)      pre-treatment girth :       post-treatment girth :       measured at (landmark location) :      min []  Other:    [] Skin assessment post-treatment (if applicable):    []  intact    []  redness- no adverse reaction                  []redness - adverse reaction:        15 min Therapeutic Exercise:  [x]  See flow sheet   Rationale:      increase ROM and increase strength to improve the patients ability to perform ADLs without pain       10 min Manual Therapy: PROM to 110 knee   Rationale:      decrease pain, increase ROM, and increase tissue extensibility to improve patient's ability to perform ADLs without pain  The manual therapy interventions were performed at a separate and distinct time from the therapeutic activities interventions      15 min Neuromuscular Re-ed: [x]  See flow sheet   Rationale:    improve balance and increase proprioception to improve the patients ability to perform ADLs without pain    Billed With/As:   [x] TE   [] TA   [] Neuro   [] Self Care Patient Education: [x] Review HEP    [] Progressed/Changed HEP based on:   [] positioning   [] body mechanics   [] transfers   [] heat/ice application    [] other:        Other Objective/Functional Measures:    PROM flexion: 117   Post Treatment Pain Level (on 0 to 10) scale:   0  / 10     ASSESSMENT  Assessment/Changes in Function:     Pt showing improved overall mobility after manual      []  See Progress Note/Recertification   Patient will continue to benefit from skilled PT services to modify and progress therapeutic interventions, address functional mobility deficits, address ROM deficits, address strength deficits, analyze and address soft tissue restrictions, and analyze and cue movement patterns to attain remaining goals.    Progress toward goals / Updated goals:    Good Progress to    [] STG    [x] LTG  1 as shown by improved overall mobility      PLAN  [x]  Upgrade activities as tolerated YES Continue plan of care   []  Discharge due to :    []  Other:      Therapist: Boyd Bennett DPT     Date: 11/28/2022 Time: 12:50 PM        Future Appointments   Date Time Provider Jim Pendleton   11/30/2022  9:30 AM Ala Blow [facility]   12/2/2022  1:25 PM Ala Blow [facility]   12/5/2022 10:55 AM Ala Blow [facility]   12/7/2022 10:55 AM Deisi Ramsey [facility]   12/9/2022 11:05 AM Emre Sieving [facility]   12/12/2022 10:55 AM Emre Sieving [facility]   12/14/2022 11:35 AM Emre Sieving [facility]   12/16/2022 11:05 AM Emre Sieving [facility]   12/19/2022 10:55 AM Emre Sieving [facility]   12/21/2022 10:55 AM Emre Sieving [facility]   12/23/2022 11:05 AM Emre Sieving [facility]   12/27/2022 11:05 AM Emre Sieving [facility]   12/28/2022 11:00 AM Emre Sieving [facility]   12/30/2022 11:05 AM Emre Sieving [facility]

## 2022-11-30 ENCOUNTER — HOSPITAL ENCOUNTER (OUTPATIENT)
Dept: PHYSICAL THERAPY | Age: 82
Discharge: HOME | End: 2022-11-30
Payer: MEDICARE

## 2022-11-30 PROCEDURE — 97140 MANUAL THERAPY 1/> REGIONS: CPT

## 2022-11-30 PROCEDURE — 97110 THERAPEUTIC EXERCISES: CPT

## 2022-11-30 PROCEDURE — 97112 NEUROMUSCULAR REEDUCATION: CPT

## 2022-11-30 NOTE — PROGRESS NOTES
PHYSICAL THERAPY - DAILY TREATMENT NOTE    Patient Name: Christin Garcia        Date: 2022  : 1940    Patient  Verified: YES  Visit #:   3   of   12  Insurance: Payor: Nydia Spence / Plan: [place] MEDICARE PART A & B / Product Type: Medicare /      In time: 9:30 Out time: 11:40   Total Treatment Time: 70     Medicare Time Tracking (below)   Total Timed Codes (min):  60 1:1 Treatment Time:  40     TREATMENT AREA/ DIAGNOSIS = Right knee pain [M25.561]    SUBJECTIVE   Pain Level (on 0 to 10 scale):  3  / 10   Medication Changes/New allergies or changes in medical history, any new surgeries or procedures? NO    If yes, update Summary List   Subjective Functional Status/Changes:  []  No changes reported     Pt reports that she has difficulty sitting for prolonged periods.  Pt states she stae yesterday for dinner and her knee swelled up and got really stiff       OBJECTIVE  Modalities Rationale:     decrease inflammation and decrease pain to improve patient's ability to perform ADLs without pain   min [] Estim, type/location:                                      []  att     []  unatt     []  w/US     []  w/ice    []  w/heat    min []  Mechanical Traction: type/lbs                   []  pro   []  sup   []  int   []  cont    []  before manual    []  after manual    min []  Ultrasound, settings/location:      min []  Iontophoresis w/ dexamethasone, location:                                               []  take home patch       []  in clinic   10 min [x]  Ice     []  Heat    location/position: R knee     min []  Vasopneumatic Device, press/temp:    If using vaso (only need to measure limb vaso being performed on)      pre-treatment girth :       post-treatment girth :       measured at (landmark location) :      min []  Other:    [] Skin assessment post-treatment (if applicable):    []  intact    []  redness- no adverse reaction                  []redness - adverse reaction:        25 min Therapeutic Exercise: [x]  See flow sheet   Rationale:      increase ROM and increase strength to improve the patients ability to perform ADLs without pain     15 min Manual Therapy: PROM to R kne   Rationale:      increase ROM and increase tissue extensibility to improve patient's ability to perform ADLs without pain  The manual therapy interventions were performed at a separate and distinct time from the therapeutic activities interventions      20 min Neuromuscular Re-ed: [x]  See flow sheet   Rationale:    improve coordination, improve balance, and increase proprioception to improve the patients ability to perform ADLs without pain    Billed With/As:   [x] TE   [] TA   [] Neuro   [] Self Care Patient Education: [x] Review HEP    [] Progressed/Changed HEP based on:   [] positioning   [] body mechanics   [] transfers   [] heat/ice application    [] other:        Other Objective/Functional Measures:    AROM 96 at arrival  PROM after manual: 106   Post Treatment Pain Level (on 0 to 10) scale:   0  / 10     ASSESSMENT  Assessment/Changes in Function:     Initiated step ups, sit to stands, and TKE. Pt requires a lot of cueing for proper mechanics with sit to stands     []  See Progress Note/Recertification   Patient will continue to benefit from skilled PT services to modify and progress therapeutic interventions, address functional mobility deficits, address ROM deficits, address strength deficits, analyze and address soft tissue restrictions, and analyze and cue movement patterns to attain remaining goals.    Progress toward goals / Updated goals:    Good Progress to    [] STG    [x] LTG  1 as shown by improved overall mobility needed for ADLs     PLAN  [x]  Upgrade activities as tolerated YES Continue plan of care   []  Discharge due to :    []  Other:      Therapist: Jameel Moreno DPT     Date: 11/30/2022 Time: 10:57 AM        Future Appointments   Date Time Provider Jim Pendleton   12/2/2022  1:25 PM Judye Coral [facility] 12/5/2022 10:55 AM Kindraie Maiers [facility]   12/7/2022 10:55 AM Kindraie Maiers [facility]   12/9/2022 11:05 AM Kindrahanie Maiers [facility]   12/12/2022 10:55 AM Kindra Maiers [facility]   12/14/2022 11:35 AM Kindra Maiers [facility]   12/16/2022 11:05 AM Kindraie Maiers [facility]   12/19/2022 10:55 AM Kindraie Maiers [facility]   12/21/2022 10:55 AM Kindraie Maiers [facility]   12/23/2022 11:05 AM Kindra Maiers [facility]   12/27/2022 11:05 AM Kindra Maiers [facility]   12/28/2022 11:00 AM Kindra Maiers [facility]   12/30/2022 11:05 AM Kindrahanie Maiers [facility]

## 2022-12-02 ENCOUNTER — HOSPITAL ENCOUNTER (OUTPATIENT)
Dept: PHYSICAL THERAPY | Age: 82
Discharge: HOME | End: 2022-12-02
Payer: MEDICARE

## 2022-12-02 PROCEDURE — 97112 NEUROMUSCULAR REEDUCATION: CPT

## 2022-12-02 PROCEDURE — 97140 MANUAL THERAPY 1/> REGIONS: CPT

## 2022-12-02 PROCEDURE — 97110 THERAPEUTIC EXERCISES: CPT

## 2022-12-02 NOTE — PROGRESS NOTES
PHYSICAL THERAPY - DAILY TREATMENT NOTE    Patient Name: Nati Bazan        Date: 2022  : 1940    Patient  Verified: YES  Visit #:      12  Insurance: Payor: Sherley Gordillo / Plan: [place] MED[place] PART A & B / Product Type: Medicare /      In time: 1:25 Out time: 2:15   Total Treatment Time: 50     Medicare Time Tracking (below)   Total Timed Codes (min):  40 1:1 Treatment Time:  40     TREATMENT AREA/ DIAGNOSIS = Right knee pain [M25.561]    SUBJECTIVE   Pain Level (on 0 to 10 scale):  1-3  / 10   Medication Changes/New allergies or changes in medical history, any new surgeries or procedures? NO    If yes, update Summary List   Subjective Functional Status/Changes:  []  No changes reported     Pt reports difficulty with walking. Difficulty bending the knee.       OBJECTIVE  Modalities Rationale:     decrease inflammation and decrease pain to improve patient's ability to perform ADLs without pain   min [] Estim, type/location:                                      []  att     []  unatt     []  w/US     []  w/ice    []  w/heat    min []  Mechanical Traction: type/lbs                   []  pro   []  sup   []  int   []  cont    []  before manual    []  after manual    min []  Ultrasound, settings/location:      min []  Iontophoresis w/ dexamethasone, location:                                               []  take home patch       []  in clinic   10 min [x]  Ice     []  Heat    location/position:     min []  Vasopneumatic Device, press/temp:    If using vaso (only need to measure limb vaso being performed on)      pre-treatment girth :       post-treatment girth :       measured at (landmark location) :      min []  Other:    [] Skin assessment post-treatment (if applicable):    []  intact    []  redness- no adverse reaction                  []redness - adverse reaction:        15 min Therapeutic Exercise:  [x]  See flow sheet   Rationale:      increase ROM and increase strength to improve the patients ability to perform ADLs without pain     10 min Manual Therapy: PROM to R knee   Rationale:      decrease pain and increase ROM to improve patient's ability to perform ADLs without pain  The manual therapy interventions were performed at a separate and distinct time from the therapeutic activities interventions    15 min Neuromuscular Re-ed: [x]  See flow sheet   Rationale:    improve coordination, improve balance, and increase proprioception to improve the patients ability to perform ADLs without pain    Billed With/As:   [x] TE   [] TA   [] Neuro   [] Self Care Patient Education: [x] Review HEP    [] Progressed/Changed HEP based on:   [] positioning   [] body mechanics   [] transfers   [] heat/ice application    [] other:        Other Objective/Functional Measures:    AROM Ext: -5deg  AROM flexion: 99deg  PROM flexion: 109deg   Post Treatment Pain Level (on 0 to 10) scale:   0  / 10     ASSESSMENT  Assessment/Changes in Function:     Pt reports showing improved overall pain levels with walking. []  See Progress Note/Recertification   Patient will continue to benefit from skilled PT services to modify and progress therapeutic interventions, address functional mobility deficits, address ROM deficits, address strength deficits, and analyze and address soft tissue restrictions to attain remaining goals.    Progress toward goals / Updated goals:    Good Progress to    [] STG    [x] LTG  1 as shown by improved overall pain levels with ADLs     PLAN  [x]  Upgrade activities as tolerated YES Continue plan of care   []  Discharge due to :    []  Other:      Therapist: Yolanda Choe DPT     Date: 12/2/2022 Time: 1:40 PM        Future Appointments   Date Time Provider Jim Pendleton   12/5/2022 10:55 AM Rayfield Laud [facility]   12/7/2022 10:55 AM Rayfield Laud [facility]   12/9/2022 11:05 AM Rayfield Laud [facility]   12/12/2022 10:55 AM Rayfield Laud [facility]   12/14/2022 11:35 AM Yeison Norlin [facility]   12/16/2022 11:05 AM [facility]   12/19/2022 10:55 AM [facility]   12/21/2022 10:55 AM [facility]   12/23/2022 11:05 AM Jeananne Salon [facility]   12/27/2022 11:05 AM [facility]   12/28/2022 11:00 AM Jeananne Salon [facility]   12/30/2022 11:05 AM Jeananne Salon [facility]

## 2022-12-05 ENCOUNTER — HOSPITAL ENCOUNTER (OUTPATIENT)
Dept: PHYSICAL THERAPY | Age: 82
Discharge: HOME | End: 2022-12-05
Payer: MEDICARE

## 2022-12-05 PROCEDURE — 97110 THERAPEUTIC EXERCISES: CPT

## 2022-12-05 PROCEDURE — 97140 MANUAL THERAPY 1/> REGIONS: CPT

## 2022-12-05 NOTE — PROGRESS NOTES
PHYSICAL THERAPY - DAILY TREATMENT NOTE    Patient Name: Opal Hagan        Date: 2022  : 1940    Patient  Verified: YES  Visit #:      of   12  Insurance: Payor: Moe Baron / Plan: [place] MEDICARE PART A & B / Product Type: Medicare /      In time: 11:15 Out time: 11:55   Total Treatment Time: 40     Medicare Time Tracking (below)   Total Timed Codes (min):  30 1:1 Treatment Time:  30     TREATMENT AREA/ DIAGNOSIS = Right knee pain [M25.561]    SUBJECTIVE   Pain Level (on 0 to 10 scale):  0-3  / 10   Medication Changes/New allergies or changes in medical history, any new surgeries or procedures? NO    If yes, update Summary List   Subjective Functional Status/Changes:  []  No changes reported     Pt reports pain in the lateral and medial knee as well as into the calf. Pt states that it has swelled up a lot recently.       OBJECTIVE  Modalities Rationale:     decrease inflammation and decrease pain to improve patient's ability to perform ADLs without pain   min [] Estim, type/location:                                      []  att     []  unatt     []  w/US     []  w/ice    []  w/heat    min []  Mechanical Traction: type/lbs                   []  pro   []  sup   []  int   []  cont    []  before manual    []  after manual    min []  Ultrasound, settings/location:      min []  Iontophoresis w/ dexamethasone, location:                                               []  take home patch       []  in clinic   10 min [x]  Ice     []  Heat    location/position: R knee    min []  Vasopneumatic Device, press/temp:    If using vaso (only need to measure limb vaso being performed on)      pre-treatment girth :       post-treatment girth :       measured at (landmark location) :      min []  Other:    [] Skin assessment post-treatment (if applicable):    []  intact    []  redness- no adverse reaction                  []redness - adverse reaction:        15 min Therapeutic Exercise:  [x]  See flow sheet Rationale:      increase ROM and increase strength to improve the patients ability to perform ADLs without pain       15 min Manual Therapy: PROM to R knee   Rationale:      decrease pain, increase ROM, and increase tissue extensibility to improve patient's ability to perform ADLs without pain  The manual therapy interventions were performed at a separate and distinct time from the therapeutic activities interventions      Billed With/As:   [] TE   [] TA   [] Neuro   [] Self Care Patient Education: [x] Review HEP    [] Progressed/Changed HEP based on:   [] positioning   [] body mechanics   [] transfers   [] heat/ice application    [] other:        Other Objective/Functional Measures:    + homans sign  Pain in calf   Post Treatment Pain Level (on 0 to 10) scale:   0  / 10     ASSESSMENT  Assessment/Changes in Function:     Pt educated to call doc immediately to schedule US to rule out DVT. Pt demonstrating pain with marshal sign. []  See Progress Note/Recertification   Patient will continue to benefit from skilled PT services to modify and progress therapeutic interventions, address functional mobility deficits, address ROM deficits, address strength deficits, analyze and address soft tissue restrictions, and analyze and cue movement patterns to attain remaining goals.    Progress toward goals / Updated goals:    Good Progress to    [] STG    [x] LTG  1 as shown by improved overall pain levels wth ADLs     PLAN    Upgrade activities as tolerated YES Continue plan of care   []  Discharge due to :    []  Other:      Therapist: Beuford Gaucher ,DPT     Date: 12/5/2022 Time: 11:51 AM        Future Appointments   Date Time Provider Jim Pendleton   12/7/2022 10:55 AM Sherrie Badger [facility]   12/9/2022 11:05 AM Sherrie Badger [facility]   12/12/2022 10:55 AM Sherrie Badger [facility]   12/14/2022 11:35 AM Sherrie Badger [facility]   12/16/2022 11:05 AM Sherrie Badger [facility]   12/19/2022 10:55 AM Yeison Pete Sabillon [facility]   12/21/2022 10:55 AM [facility]   12/23/2022 11:05 AM [facility]   12/27/2022 11:05 AM Rochele Rooks [facility]   12/28/2022 11:00 AM [facility]   12/30/2022 11:05 AM [facility]

## 2022-12-07 ENCOUNTER — APPOINTMENT (OUTPATIENT)
Dept: PHYSICAL THERAPY | Age: 82
End: 2022-12-07
Payer: MEDICARE

## 2022-12-09 ENCOUNTER — APPOINTMENT (OUTPATIENT)
Dept: PHYSICAL THERAPY | Age: 82
End: 2022-12-09
Payer: MEDICARE

## 2022-12-12 ENCOUNTER — HOSPITAL ENCOUNTER (OUTPATIENT)
Dept: PHYSICAL THERAPY | Age: 82
Discharge: HOME | End: 2022-12-12
Payer: MEDICARE

## 2022-12-12 PROCEDURE — 97140 MANUAL THERAPY 1/> REGIONS: CPT

## 2022-12-12 PROCEDURE — 97530 THERAPEUTIC ACTIVITIES: CPT

## 2022-12-12 PROCEDURE — 97110 THERAPEUTIC EXERCISES: CPT

## 2022-12-12 NOTE — PROGRESS NOTES
PHYSICAL THERAPY - DAILY TREATMENT NOTE    Patient Name: Candelario Sepulveda        Date: 2022  : 1940    Patient  Verified: YES  Visit #:     Insurance: Payor: Caleb Memory / Plan: [place] MEDICARE PART A & B / Product Type: Medicare /      In time: 10:55 Out time: 11:45   Total Treatment Time: 50     Medicare Time Tracking (below)   Total Timed Codes (min):  40 1:1 Treatment Time:  40     TREATMENT AREA/ DIAGNOSIS = Right knee pain [M25.561]    SUBJECTIVE   Pain Level (on 0 to 10 scale):  0  / 10   Medication Changes/New allergies or changes in medical history, any new surgeries or procedures? NO    If yes, update Summary List   Subjective Functional Status/Changes:  []  No changes reported     Pt reports she is not having as much calf pain. Pt states that the knee is super tight. Pt reports difficulty with stairs.        OBJECTIVE  Modalities Rationale:     decrease inflammation and decrease pain to improve patient's ability to perform ADLs without pain   min [] Estim, type/location:                                      []  att     []  unatt     []  w/US     []  w/ice    []  w/heat    min []  Mechanical Traction: type/lbs                   []  pro   []  sup   []  int   []  cont    []  before manual    []  after manual    min []  Ultrasound, settings/location:      min []  Iontophoresis w/ dexamethasone, location:                                               []  take home patch       []  in clinic    min []  Ice     []  Heat    location/position:     min []  Vasopneumatic Device, press/temp:    If using vaso (only need to measure limb vaso being performed on)      pre-treatment girth :       post-treatment girth :       measured at (landmark location) :      min []  Other:    [] Skin assessment post-treatment (if applicable):    []  intact    []  redness- no adverse reaction                  []redness - adverse reaction:        15 min Therapeutic Exercise:  [x]  See flow sheet   Rationale: increase ROM and increase strength to improve the patients ability to perform ADLs without pain     10 min Manual Therapy: PROM to R knee   Rationale:      decrease pain, increase ROM, and increase tissue extensibility to improve patient's ability to perform ADLs without pain  The manual therapy interventions were performed at a separate and distinct time from the therapeutic activities interventions      15 min Therapeutic Activity: [x]  See flow sheet   Rationale:     functional activities  to improve the patients ability to perform ADLs without pain    Billed With/As:   [x] TE   [] TA   [] Neuro   [] Self Care Patient Education: [x] Review HEP    [] Progressed/Changed HEP based on:   [] positioning   [] body mechanics   [] transfers   [] heat/ice application    [] other:        Other Objective/Functional Measures:    AROM flexion 107deg   Post Treatment Pain Level (on 0 to 10) scale:   0  / 10     ASSESSMENT  Assessment/Changes in Function:     Pt showing improved overall mobility with ADLs     []  See Progress Note/Recertification   Patient will continue to benefit from skilled PT services to modify and progress therapeutic interventions, address functional mobility deficits, address ROM deficits, address strength deficits, analyze and address soft tissue restrictions, and analyze and cue movement patterns to attain remaining goals.    Progress toward goals / Updated goals:    Good Progress to    [] STG    [x] LTG  1 as shown by improved overall pain levels with ADLs     PLAN  [x]  Upgrade activities as tolerated YES Continue plan of care   []  Discharge due to :    []  Other:      Therapist: Jordyn Adams DPT     Date: 12/12/2022 Time: 1:09 PM        Future Appointments   Date Time Provider Jim Pendleton   12/14/2022 11:35 AM Lemuel Leavens [facility]   12/16/2022 11:05 AM Lemuel Leavens [facility]   12/19/2022 10:55 AM Lemuel Leavens [facility]   12/21/2022 10:55 AM Lemuel Leavens [facility] 12/23/2022 11:05 AM [facility]   12/27/2022 11:05 AM [facility]   12/28/2022 11:00 AM [facility]   12/30/2022 11:05 AM [facility]

## 2022-12-14 ENCOUNTER — HOSPITAL ENCOUNTER (OUTPATIENT)
Dept: PHYSICAL THERAPY | Age: 82
Discharge: HOME | End: 2022-12-14
Payer: MEDICARE

## 2022-12-14 PROCEDURE — 97110 THERAPEUTIC EXERCISES: CPT

## 2022-12-14 PROCEDURE — 97140 MANUAL THERAPY 1/> REGIONS: CPT

## 2022-12-14 NOTE — PROGRESS NOTES
PHYSICAL THERAPY - DAILY TREATMENT NOTE    Patient Name: Rai Diallo        Date: 2022  : 1940    Patient  Verified: YES  Visit #:     Insurance: Payor: Den Yip / Plan: [place] MEDICARE PART A & B / Product Type: Medicare /      In time: 11:35 Out time: 12:50   Total Treatment Time: 75     Medicare Time Tracking (below)   Total Timed Codes (min):  65 1:1 Treatment Time:  30     TREATMENT AREA/ DIAGNOSIS = Right knee pain [M25.561]    SUBJECTIVE   Pain Level (on 0 to 10 scale):  0  / 10   Medication Changes/New allergies or changes in medical history, any new surgeries or procedures? NO    If yes, update Summary List   Subjective Functional Status/Changes:  []  No changes reported     Pt reports no pain today. Pt states that she is having difficulty with stairs. Pt states that the knee is less swollen and she has been taking her medication and wearing the compression stocking as directed.       OBJECTIVE  Modalities Rationale:     decrease inflammation and decrease pain to improve patient's ability to perform ADLs without pain   min [] Estim, type/location:                                      []  att     []  unatt     []  w/US     []  w/ice    []  w/heat    min []  Mechanical Traction: type/lbs                   []  pro   []  sup   []  int   []  cont    []  before manual    []  after manual    min []  Ultrasound, settings/location:      min []  Iontophoresis w/ dexamethasone, location:                                               []  take home patch       []  in clinic   10 min [x]  Ice     []  Heat    location/position: R knee    min []  Vasopneumatic Device, press/temp:    If using vaso (only need to measure limb vaso being performed on)      pre-treatment girth :       post-treatment girth :       measured at (landmark location) :      min []  Other:    [] Skin assessment post-treatment (if applicable):    []  intact    []  redness- no adverse reaction                  []redness - adverse reaction:        30 min Therapeutic Exercise:  [x]  See flow sheet   Rationale:      increase ROM and increase strength to improve the patients ability to perform ADLs without pain     15 min Manual Therapy: PROM to R knee   Rationale:      decrease pain, increase ROM, and increase tissue extensibility to improve patient's ability to perform ADLs without pain  The manual therapy interventions were performed at a separate and distinct time from the therapeutic activities interventions      10 min Therapeutic Activity: [x]  See flow sheet   Rationale:     functional activities  to improve the patients ability to perform ADLs without pain    10 min Neuromuscular Re-ed: [x]  See flow sheet   Rationale:    improve coordination, improve balance, and increase proprioception to improve the patients ability to perform ADLs without pain    Billed With/As:   [] TE   [] TA   [] Neuro   [] Self Care Patient Education: [x] Review HEP    [] Progressed/Changed HEP based on:   [] positioning   [] body mechanics   [] transfers   [] heat/ice application    [] other:        Other Objective/Functional Measures:    PROM flexion: 115   Post Treatment Pain Level (on 0 to 10) scale:   0  / 10     ASSESSMENT  Assessment/Changes in Function:     Pt showing improved mobility since using the compression stocking. Swelling down considerably      []  See Progress Note/Recertification   Patient will continue to benefit from skilled PT services to modify and progress therapeutic interventions, address functional mobility deficits, address ROM deficits, address strength deficits, analyze and address soft tissue restrictions, and analyze and cue movement patterns to attain remaining goals.    Progress toward goals / Updated goals:    Good Progress to    [] STG    [] LTG  1 as shown by improved overall pain levels with ADLs     PLAN  [x]  Upgrade activities as tolerated YES Continue plan of care   []  Discharge due to :    []  Other: Therapist: Francisco Javier Louis DPT     Date: 12/14/2022 Time: 3:06 PM        Future Appointments   Date Time Provider Jim Pendleton   12/16/2022 11:05 AM Malachi Arab [facility]   12/19/2022 10:55 AM Malachi Arab [facility]   12/21/2022 10:55 AM Malachi Arab [facility]   12/23/2022 11:05 AM Malachi Arab [facility]   12/27/2022 11:05 AM Malachi Arab [facility]   12/28/2022 11:00 AM Malachi Arab [facility]   12/30/2022 11:05 AM Malachi Arab [facility]

## 2022-12-16 ENCOUNTER — HOSPITAL ENCOUNTER (OUTPATIENT)
Dept: PHYSICAL THERAPY | Age: 82
Discharge: HOME | End: 2022-12-16
Payer: MEDICARE

## 2022-12-16 PROCEDURE — 97140 MANUAL THERAPY 1/> REGIONS: CPT

## 2022-12-16 PROCEDURE — 97110 THERAPEUTIC EXERCISES: CPT

## 2022-12-16 PROCEDURE — 97112 NEUROMUSCULAR REEDUCATION: CPT

## 2022-12-16 NOTE — PROGRESS NOTES
PHYSICAL THERAPY - DAILY TREATMENT NOTE    Patient Name: Izzy Ruiz        Date: 2022  : 1940    Patient  Verified: YES  Visit #:     Insurance: Payor: Sridhar Abbot / Plan: [place] & B / Product Type: Medicare /      In time: 11:15 Out time: 12:05   Total Treatment Time: 50     Medicare Time Tracking (below)   Total Timed Codes (min):  40 1:1 Treatment Time:  40     TREATMENT AREA/ DIAGNOSIS = Right knee pain [M25.561]    SUBJECTIVE   Pain Level (on 0 to 10 scale):  2  / 10   Medication Changes/New allergies or changes in medical history, any new surgeries or procedures? NO    If yes, update Summary List   Subjective Functional Status/Changes:  []  No changes reported     Pt reports difficulty walking and bending the knee. Pt states no pain in the calf.  Pt states she is wearing the compression stocking all the time      OBJECTIVE  Modalities Rationale:     decrease inflammation and decrease pain to improve patient's ability to perform ADLs without pain   min [] Estim, type/location:                                      []  att     []  unatt     []  w/US     []  w/ice    []  w/heat    min []  Mechanical Traction: type/lbs                   []  pro   []  sup   []  int   []  cont    []  before manual    []  after manual    min []  Ultrasound, settings/location:      min []  Iontophoresis w/ dexamethasone, location:                                               []  take home patch       []  in clinic   10 min [x]  Ice     []  Heat    location/position: R knee    min []  Vasopneumatic Device, press/temp:    If using vaso (only need to measure limb vaso being performed on)      pre-treatment girth :       post-treatment girth :       measured at (landmark location) :      min []  Other:    [] Skin assessment post-treatment (if applicable):    []  intact    []  redness- no adverse reaction                  []redness - adverse reaction:        15 min Therapeutic Exercise:  [x] See flow sheet   Rationale:      increase ROM and increase strength to improve the patients ability to perform ADLs without pain     10 min Manual Therapy: PROM to R knee   Rationale:      decrease pain, increase ROM, and increase tissue extensibility to improve patient's ability to perform ADLs without pain  The manual therapy interventions were performed at a separate and distinct time from the therapeutic activities interventions    15 min Neuromuscular Re-ed: [x]  See flow sheet   Rationale:    improve coordination and increase proprioception to improve the patients ability to perform ADLs without pain    Billed With/As:   [] TE   [] TA   [] Neuro   [] Self Care Patient Education: [x] Review HEP    [] Progressed/Changed HEP based on:   [] positioning   [] body mechanics   [] transfers   [] heat/ice application    [] other:        Other Objective/Functional Measures:    Pre manual therapy AROM flexion: 100geg  Post manual therapy AROM flexion: 110deg   Post Treatment Pain Level (on 0 to 10) scale:   0  / 10     ASSESSMENT  Assessment/Changes in Function:     Pt showing improved swelling and also improved overall mobility needed for ADLs     []  See Progress Note/Recertification   Patient will continue to benefit from skilled PT services to modify and progress therapeutic interventions, address functional mobility deficits, address ROM deficits, address strength deficits, analyze and address soft tissue restrictions, analyze and cue movement patterns, and analyze and modify body mechanics/ergonomics to attain remaining goals.    Progress toward goals / Updated goals:    Good Progress to    [] STG    [x] LTG  1 as shown by improved mobility needed for ADLs     PLAN  [x]  Upgrade activities as tolerated YES Continue plan of care   []  Discharge due to :    []  Other:      Therapist: Romelia Solano DPT     Date: 12/16/2022 Time: 12:57 PM        Future Appointments   Date Time Provider Jim Pendleton   12/19/2022 10:55 AM [facility]re Sieving [facility]   12/21/2022 10:55 AM Emre [facility]ving [facility]   12/23/2022 11:05 AM Emre Sieving [facility]   12/27/2022 11:05 AM [facility]re Sieving [facility]   12/30/2022 11:05 AM Emre Sieving [facility]   1/4/2023 11:00 AM [facility]re Sieving [facility]   1/6/2023 10:30 AM Emre Sieving [facility]   1/9/2023 11:00 AM Emre Sieving [facility]   1/11/2023 11:00 AM Emre Sieving [facility]   1/13/2023 10:30 AM Emre Sieving [facility]   1/16/2023 11:00 AM Emre Sieving [facility]   1/18/2023 11:00 AM Emre Sieving [facility]   1/20/2023 10:30 AM Emre Sieving [facility]   1/23/2023 11:00 AM Emre Sieving [facility]   1/25/2023 11:00 AM Emre Sieving [facility]   1[facility]27/2023 10:30 [facility]

## 2022-12-19 ENCOUNTER — HOSPITAL ENCOUNTER (OUTPATIENT)
Dept: PHYSICAL THERAPY | Age: 82
Discharge: HOME | End: 2022-12-19
Payer: MEDICARE

## 2022-12-19 PROCEDURE — 97140 MANUAL THERAPY 1/> REGIONS: CPT

## 2022-12-19 PROCEDURE — 97110 THERAPEUTIC EXERCISES: CPT

## 2022-12-19 NOTE — PROGRESS NOTES
PHYSICAL THERAPY - DAILY TREATMENT NOTE    Patient Name: Fatuma Hutchinson        Date: 2022  : 1940    Patient  Verified: YES  Visit #:     Insurance: Payor: Aga Mosley / Plan: [place] MEDICARE PART A & B / Product Type: Medicare /      In time: 11:10 Out time: 11;50   Total Treatment Time: 40     Medicare Time Tracking (below)   Total Timed Codes (min):  30 1:1 Treatment Time:  30     TREATMENT AREA/ DIAGNOSIS = Right knee pain [M25.561]    SUBJECTIVE   Pain Level (on 0 to 10 scale):  0-3  / 10   Medication Changes/New allergies or changes in medical history, any new surgeries or procedures? NO    If yes, update Summary List   Subjective Functional Status/Changes:  []  No changes reported     Pt reports difficulty with walking. Pt states that she isnt having any calf pain. Pt using the compression sock all the time when shes able to ingris it on.        OBJECTIVE  Modalities Rationale:     decrease inflammation and decrease pain to improve patient's ability to perform ADLs without pain   min [] Estim, type/location:                                      []  att     []  unatt     []  w/US     []  w/ice    []  w/heat    min []  Mechanical Traction: type/lbs                   []  pro   []  sup   []  int   []  cont    []  before manual    []  after manual    min []  Ultrasound, settings/location:      min []  Iontophoresis w/ dexamethasone, location:                                               []  take home patch       []  in clinic   10 min [x]  Ice     []  Heat    location/position: R knee    min []  Vasopneumatic Device, press/temp:    If using vaso (only need to measure limb vaso being performed on)      pre-treatment girth :       post-treatment girth :       measured at (landmark location) :      min []  Other:    [] Skin assessment post-treatment (if applicable):    []  intact    []  redness- no adverse reaction                  []redness - adverse reaction:        20 min Therapeutic Exercise:  [x]  See flow sheet   Rationale:      increase ROM and increase strength to improve the patients ability to perform ADLs without pain     10 min Manual Therapy: PROM to R knee   Rationale:      increase ROM and increase tissue extensibility to improve patient's ability to perform ADLs without pain  The manual therapy interventions were performed at a separate and distinct time from the therapeutic activities interventions    Billed With/As:   [x] TE   [] TA   [] Neuro   [] Self Care Patient Education: [x] Review HEP    [] Progressed/Changed HEP based on:   [] positioning   [] body mechanics   [] transfers   [] heat/ice application    [] other:        Other Objective/Functional Measures:    AROm knee flexion: 105deg  PROM knee flexion after manual: 113deg   Post Treatment Pain Level (on 0 to 10) scale:   0  / 10     ASSESSMENT  Assessment/Changes in Function:     Pt showing improved overall mobility. Pt showing less overall swelling. Strength has shown some improvements     []  See Progress Note/Recertification   Patient will continue to benefit from skilled PT services to modify and progress therapeutic interventions, address functional mobility deficits, address ROM deficits, address strength deficits, analyze and address soft tissue restrictions, analyze and cue movement patterns, and analyze and modify body mechanics/ergonomics to attain remaining goals.    Progress toward goals / Updated goals:    Good Progress to    [] STG    [x] LTG  1 as shown by improved overall mobility needed for ADLs     PLAN  [x]  Upgrade activities as tolerated YES Continue plan of care   []  Discharge due to :    []  Other:      Therapist: Gallo Sánchez DPT     Date: 12/19/2022 Time: 2:24 PM        Future Appointments   Date Time Provider Jim Pendleton   12/21/2022 10:55 AM Bethanie Maiers [facility]   12/23/2022 11:05 AM Bethanie Maiers [facility]   12/27/2022 11:05 AM Bethanie Maiers [facility]   12/30/2022 11:05 AM Ericka Huerta [facility]   1/4/2023 11:00 AM Amanda [facility]   1/6/2023 10:30 AM Amanda [facility]   1/9/2023 11:00 AM Amanda [facility]   1/11/2023 11:00 AM Amanda [facility]   1/13/2023 10:30 AM Amanda [facility]   1/16/2023 11:00 AM Amanda [facility]   1/18/2023 11:00 AM Amanda [facility]   1/20/2023 10:30 AM Amanda [facility]   1/23/2023 11:00 AM Amanda [facility]   1/25/2023 11:00 AM Amanda [facility]   1/27/2023 10:30 AM Amanda [facility]

## 2022-12-21 ENCOUNTER — HOSPITAL ENCOUNTER (OUTPATIENT)
Dept: PHYSICAL THERAPY | Age: 82
Discharge: HOME | End: 2022-12-21
Payer: MEDICARE

## 2022-12-21 PROCEDURE — 97112 NEUROMUSCULAR REEDUCATION: CPT

## 2022-12-21 PROCEDURE — 97140 MANUAL THERAPY 1/> REGIONS: CPT

## 2022-12-21 PROCEDURE — 97110 THERAPEUTIC EXERCISES: CPT

## 2022-12-21 NOTE — PROGRESS NOTES
[address] [facility] PHYSICAL THERAPY AT [facility] [address] [place], [address]  Phone: [phone]  Fax: [phone]  [place]          Patient Name: Elver Becker : 1940   Treatment/Medical Diagnosis: Right knee pain [M25.561]   Onset Date: 2022    Referral Source: EDILBERTO Valadez* Start of Care [facility]): 2022   Prior Hospitalization: See Medical History Provider #: [id number]   Prior Level of Function: Pt has chronic R knee pain with ADLs    Comorbidities: Pt reports arthritis and high blood pressure   Medications: Verified on Patient Summary List   Visits from [facility]: 10 Missed Visits: -     Goal/Measure of Progress Goal Met? 1. Increase score on FOTO to > or = to 65 points to demo an increase in functional activity tolerance with the LE   Status at last Eval: 36 Current Status: 56 no   2. Pt will note < or = 2/10 pain with all mobility to improve comfort with ADLs. Status at last Eval: 3-10 Current Status: 0-3 no   3. Pt will demonstrate a GROC score of >/= +5 to show overall improvement in function   Status at last Eval: NA Current Status: +2 no     Key Functional Changes/Progress: Pt is s/p R TKA on 2022. Pt has shown great overall improvement. Pt AROM flexion: 103deg, AROM ext: -4deg. PROM flexion: 113deg (after manual therapy), PROM Ext: 0deg (after manual therapy). Pt showing improved overall swelling. Pt wearing compression sock on R LE due to possible blood clot that could not be ruled out by her PCP but was treated for it. Pt not having an calf pain or symptoms currently of DVT. Pt requiring HHA for steps and also has significant compensation with sit to stand to avoid using the RLE.   Problem List: pain affecting function, decrease ROM, decrease strength, edema affecting function, impaired gait/ balance, decrease ADL/ functional abilitiies, decrease activity tolerance, and decrease flexibility/ joint mobility   Treatment Plan may include any combination of the following: Therapeutic exercise, Neuromuscular reeducation, Manual therapy, Therapeutic activity, Self care/home management, Electric stim unattended , Gait training, and Ultrasound  Patient Goal(s) has been updated and includes:     Goals for this certification period include and are to be achieved in   4  weeks:  Continue unmet goals above  Increased AROM knee flexion to >/= 120deg for improved mobility needed for ADLs  Frequency / Duration:   Patient to be seen   2-3   times per week for   4-6    weeks:    Assessments/Recommendations: Pt to continue PT 2-3x a week for 4-6 weeks to continue to improve overall mobility and strength needed for ADLs  If you have any questions/comments please contact us directly at [phone]. Thank you for allowing us to assist in the care of your patient. Therapist Signature: Jessica Chao Date: 49/29/1880   Certification Period:  Reporting Period: 12/21/2022-3/20/2023  11/21/2022-12/21/2022 Time: 8:39 AM   NOTE TO PHYSICIAN:  PLEASE COMPLETE THE ORDERS BELOW AND FAX TO   [facility] at [address]: [phone]. If you are unable to process this request in 24 hours please contact our office: [phone].    ___ I have read the above report and request that my patient continue as recommended.   ___ I have read the above report and request that my patient continue therapy with the following changes/special instructions: ________________________________________________   ___ I have read the above report and request that my patient be discharged from therapy. Physician Signature:        Date:       Time:    EDILBERTO Diaz*.

## 2022-12-21 NOTE — PROGRESS NOTES
PHYSICAL THERAPY - DAILY TREATMENT NOTE    Patient Name: Emily Edouard        Date: 2022  : 1940    Patient  Verified: YES  Visit #:   10   of   24  Insurance: Payor: Radha Fine / Plan: [place] MEDICARE PART A & B / Product Type: Medicare /      In time: 11:00 Out time: 11:50   Total Treatment Time: 50     Medicare Time Tracking (below)   Total Timed Codes (min):  40 1:1 Treatment Time:  40     TREATMENT AREA/ DIAGNOSIS = Right knee pain [M25.561]    SUBJECTIVE   Pain Level (on 0 to 10 scale):  0-3  / 10   Medication Changes/New allergies or changes in medical history, any new surgeries or procedures?     NO    If yes, update Summary List   Subjective Functional Status/Changes:  []  No changes reported     See Recert      OBJECTIVE  Modalities Rationale:     decrease inflammation and decrease pain to improve patient's ability to perform ADLs without pain   min [] Estim, type/location:                                      []  att     []  unatt     []  w/US     []  w/ice    []  w/heat    min []  Mechanical Traction: type/lbs                   []  pro   []  sup   []  int   []  cont    []  before manual    []  after manual    min []  Ultrasound, settings/location:      min []  Iontophoresis w/ dexamethasone, location:                                               []  take home patch       []  in clinic    min []  Ice     []  Heat    location/position:     min []  Vasopneumatic Device, press/temp:    If using vaso (only need to measure limb vaso being performed on)      pre-treatment girth :       post-treatment girth :       measured at (landmark location) :      min []  Other:    [] Skin assessment post-treatment (if applicable):    []  intact    []  redness- no adverse reaction                  []redness - adverse reaction:        15 min Therapeutic Exercise:  [x]  See flow sheet   Rationale:      increase ROM, increase strength, and improve coordination to improve the patients ability to perform ADLs without pain     15 min Manual Therapy: PROM to R knee   Rationale:      decrease pain, increase ROM, and increase tissue extensibility to improve patient's ability to perform ADLs without pain  The manual therapy interventions were performed at a separate and distinct time from the therapeutic activities interventions      10 min Neuromuscular Re-ed: [x]  See flow sheet   Rationale:    improve coordination, improve balance, and increase proprioception to improve the patients ability to perform ADLs without pain    Billed With/As:   [x] TE   [] TA   [] Neuro   [] Self Care Patient Education: [x] Review HEP    [] Progressed/Changed HEP based on:   [] positioning   [] body mechanics   [] transfers   [] heat/ice application    [] other:        Other Objective/Functional Measures:    See Recert   Post Treatment Pain Level (on 0 to 10) scale:   0  / 10     ASSESSMENT  Assessment/Changes in Function:     See Recert     []  See Progress Note/Recertification   Patient will continue to benefit from skilled PT services to modify and progress therapeutic interventions, address functional mobility deficits, address ROM deficits, address strength deficits, analyze and address soft tissue restrictions, analyze and cue movement patterns, and analyze and modify body mechanics/ergonomics to attain remaining goals.    Progress toward goals / Updated goals:    See Recert     PLAN  [x]  Upgrade activities as tolerated YES Continue plan of care   []  Discharge due to :    []  Other:      Therapist: Christy Kwan DPT     Date: 12/21/2022 Time: 8:42 AM        Future Appointments   Date Time Provider Jim Pendleton   12/21/2022 10:55 AM Lynette Lunger [facility]   12/23/2022 11:05 AM Lynette Lunger [facility]   12/27/2022 11:05 AM Lynette Lunger [facility]   12/30/2022 11:05 AM Lynette Lunger [facility]   1/4/2023 11:00 AM Lynette Lunger [facility]   1/6/2023 10:30 AM Lynette Lunger [facility]   1/9/2023 11:00 AM Chalo Solomon [facility]   1/11/2023 11:00 AM Loreda  [facility]   1/13/2023 10:30 AM Loreda  [facility]   1[facility]16/2023 11:00 AM Loreda  [facility]   1/18/2023 11:00 AM Loreda  [facility]   1[facility]20/2023 10:30 AM Loreda  [facility]   1/23/2023 11:00 AM Loreda  [facility]   1/25/2023 11:00 AM Loreda  [facility]   1/27/2023 10[facility]30 AM Loreda  [facility]

## 2022-12-23 ENCOUNTER — HOSPITAL ENCOUNTER (OUTPATIENT)
Dept: PHYSICAL THERAPY | Age: 82
Discharge: HOME | End: 2022-12-23
Payer: MEDICARE

## 2022-12-23 PROCEDURE — 97140 MANUAL THERAPY 1/> REGIONS: CPT

## 2022-12-23 PROCEDURE — 97110 THERAPEUTIC EXERCISES: CPT

## 2022-12-23 PROCEDURE — 97530 THERAPEUTIC ACTIVITIES: CPT

## 2022-12-23 NOTE — PROGRESS NOTES
PHYSICAL THERAPY - DAILY TREATMENT NOTE    Patient Name: Malcolm Bautista        Date: 2022  : 1940    Patient  Verified: YES  Visit #:     Insurance: Payor: Nasir Gee / Plan: [place] MED[place] PART A & B / Product Type: Medicare /      In time: 11:15 Out time: 12:05   Total Treatment Time: 50     Medicare Time Tracking (below)   Total Timed Codes (min):  40 1:1 Treatment Time:  40     TREATMENT AREA/ DIAGNOSIS = Right knee pain [M25.561]    SUBJECTIVE   Pain Level (on 0 to 10 scale):  0  / 10   Medication Changes/New allergies or changes in medical history, any new surgeries or procedures? NO    If yes, update Summary List   Subjective Functional Status/Changes:  []  No changes reported     Pt reports that it is stiff all the time. Pt states she has difficulty with walking after sitting.        OBJECTIVE  Modalities Rationale:     decrease inflammation and decrease pain to improve patient's ability to perform ADLs without pain   min [] Estim, type/location:                                      []  att     []  unatt     []  w/US     []  w/ice    []  w/heat    min []  Mechanical Traction: type/lbs                   []  pro   []  sup   []  int   []  cont    []  before manual    []  after manual    min []  Ultrasound, settings/location:      min []  Iontophoresis w/ dexamethasone, location:                                               []  take home patch       []  in clinic   10 min [x]  Ice     []  Heat    location/position: R knee    min []  Vasopneumatic Device, press/temp:    If using vaso (only need to measure limb vaso being performed on)      pre-treatment girth :       post-treatment girth :       measured at (landmark location) :      min []  Other:    [] Skin assessment post-treatment (if applicable):    []  intact    []  redness- no adverse reaction                  []redness - adverse reaction:        15 min Therapeutic Exercise:  [x]  See flow sheet   Rationale:      increase ROM and increase strength to improve the patients ability to perform ADLs without pain     15 min Manual Therapy: PROM to R knee. Scar massage   Rationale:      decrease pain, increase ROM, and increase tissue extensibility to improve patient's ability to perform ADLs without pain  The manual therapy interventions were performed at a separate and distinct time from the therapeutic activities interventions      10 min Therapeutic Activity: [x]  See flow sheet   Rationale:     functional activities  to improve the patients ability to perform ADLs without pain     min Neuromuscular Re-ed: [x]  See flow sheet   Rationale:    improve coordination, improve balance, and increase proprioception to improve the patients ability to perform ADLs without pain    Billed With/As:   [x] TE   [] TA   [] Neuro   [] Self Care Patient Education: [x] Review HEP    [] Progressed/Changed HEP based on:   [] positioning   [] body mechanics   [] transfers   [] heat/ice application    [] other:        Other Objective/Functional Measures:    AROM flexion: 102  PROM: 118deg   Post Treatment Pain Level (on 0 to 10) scale:   0  / 10     ASSESSMENT  Assessment/Changes in Function:     Pt showing improved mobility after manual therapy techniques     []  See Progress Note/Recertification   Patient will continue to benefit from skilled PT services to modify and progress therapeutic interventions, address functional mobility deficits, address ROM deficits, address strength deficits, and analyze and address soft tissue restrictions to attain remaining goals.    Progress toward goals / Updated goals:    Good Progress to    [] STG    [x] LTG  1 as shown by improved overall mobility needed for ADLs     PLAN  [x]  Upgrade activities as tolerated YES Continue plan of care   []  Discharge due to :    []  Other:      Therapist: Kobe Flores DPT     Date: 12/23/2022 Time: 8:41 AM        Future Appointments   Date Time Provider Jim Pendleton   12/23/2022 11:05 AM [facility]   12/27/2022 11:05 AM [facility]   12/30/2022 11:05 AM [facility]   1/4/2023 11:00 AM [facility]   1/6/2023 10:30 AM [facility]   1/9/2023 11:00 AM [facility]   1/11/2023 11:00 AM [facility]   1[facility]13/2023 10:30 AM [facility]   1/16/2023 11[facility]00 AM [facility]   1/18/2023 11:00 AM [facility]   1[facility]20/2023 10:30 AM [facility]   1[facility]23/2023 11:00 AM [facility]   1/25/2023 11:00 AM [facility]   1/27/2023 10:30 AM [facility]

## 2022-12-27 ENCOUNTER — HOSPITAL ENCOUNTER (OUTPATIENT)
Dept: PHYSICAL THERAPY | Age: 82
Discharge: HOME | End: 2022-12-27
Payer: MEDICARE

## 2022-12-27 PROCEDURE — 97110 THERAPEUTIC EXERCISES: CPT

## 2022-12-27 PROCEDURE — 97140 MANUAL THERAPY 1/> REGIONS: CPT

## 2022-12-27 PROCEDURE — 97112 NEUROMUSCULAR REEDUCATION: CPT

## 2022-12-27 NOTE — PROGRESS NOTES
PHYSICAL THERAPY - DAILY TREATMENT NOTE    Patient Name: Dane Keane        Date: 2022  : 1940    Patient  Verified: YES  Visit #:     Insurance: Payor: Annalisa Lowry / Plan: [place] MEDICARE PART A & B / Product Type: Medicare /      In time: 11:05 Out time: 11:55   Total Treatment Time: 50     Medicare Time Tracking (below)   Total Timed Codes (min):  40 1:1 Treatment Time:  40     TREATMENT AREA/ DIAGNOSIS = Right knee pain [M25.561]    SUBJECTIVE   Pain Level (on 0 to 10 scale):  0  / 10   Medication Changes/New allergies or changes in medical history, any new surgeries or procedures? NO    If yes, update Summary List   Subjective Functional Status/Changes:  []  No changes reported     Pt reports that she is walking better.  Pt states that she is moving       OBJECTIVE  Modalities Rationale:     decrease inflammation and decrease pain to improve patient's ability to perform ADLs without pain   min [] Estim, type/location:                                      []  att     []  unatt     []  w/US     []  w/ice    []  w/heat    min []  Mechanical Traction: type/lbs                   []  pro   []  sup   []  int   []  cont    []  before manual    []  after manual    min []  Ultrasound, settings/location:      min []  Iontophoresis w/ dexamethasone, location:                                               []  take home patch       []  in clinic   10 min [x]  Ice     []  Heat    location/position: R knee    min []  Vasopneumatic Device, press/temp:    If using vaso (only need to measure limb vaso being performed on)      pre-treatment girth :       post-treatment girth :       measured at (landmark location) :      min []  Other:    [] Skin assessment post-treatment (if applicable):    []  intact    []  redness- no adverse reaction                  []redness - adverse reaction:        15 min Therapeutic Exercise:  [x]  See flow sheet   Rationale:      increase ROM and increase strength to improve the patients ability to perform ADLs without pain     10 min Manual Therapy: PROM to R knee   Rationale:      decrease pain and increase ROM to improve patient's ability to perform ADLs without pain  The manual therapy interventions were performed at a separate and distinct time from the therapeutic activities interventions    15 min Neuromuscular Re-ed: [x]  See flow sheet   Rationale:    improve coordination, improve balance, and increase proprioception to improve the patients ability to perform ADLs without pain    Billed With/As:   [x] TE   [] TA   [] Neuro   [] Self Care Patient Education: [x] Review HEP    [] Progressed/Changed HEP based on:   [] positioning   [] body mechanics   [] transfers   [] heat/ice application    [] other:        Other Objective/Functional Measures:    AROM flexion 107deg  PROM flexion: 116deg   Post Treatment Pain Level (on 0 to 10) scale:   0  / 10     ASSESSMENT  Assessment/Changes in Function:     Pt showing improved overall mobility      []  See Progress Note/Recertification   Patient will continue to benefit from skilled PT services to modify and progress therapeutic interventions, address functional mobility deficits, address ROM deficits, address strength deficits, and analyze and address soft tissue restrictions to attain remaining goals.    Progress toward goals / Updated goals:    Good Progress to    [] STG    [x] LTG  1 as shown by improved overall pain levels with ADLs     PLAN  [x]  Upgrade activities as tolerated YES Continue plan of care   []  Discharge due to :    []  Other:      Therapist: Odessa Loza DPT     Date: 12/27/2022 Time: 1:22 PM        Future Appointments   Date Time Provider Jim Pendleton   12/30/2022 11:05 AM Caleb Finger [facility]   1/4/2023 11:00 AM Caleb Finger [facility]   1/6/2023 10:30 AM Caleb Finger [facility]   1/9/2023 11:00 AM Caleb Finger [facility]   1/11/2023 11:00 AM Caleb Finger [facility]   1/13/2023 10:30 AM [facility]   1/16/2023 11:00 AM [facility]   1/18/2023 11:00 AM [facility]   1/20/2023 10:30 AM [facility]   1/23/2023 11:00 AM [facility]   1/25/2023 11:00 AM [facility]   1/27/2023 10:30 AM [facility]

## 2022-12-28 ENCOUNTER — APPOINTMENT (OUTPATIENT)
Dept: PHYSICAL THERAPY | Age: 82
End: 2022-12-28
Payer: MEDICARE

## 2022-12-30 ENCOUNTER — HOSPITAL ENCOUNTER (OUTPATIENT)
Dept: PHYSICAL THERAPY | Age: 82
End: 2022-12-30
Payer: MEDICARE

## 2022-12-30 PROCEDURE — 97110 THERAPEUTIC EXERCISES: CPT

## 2022-12-30 PROCEDURE — 97530 THERAPEUTIC ACTIVITIES: CPT

## 2022-12-30 PROCEDURE — 97140 MANUAL THERAPY 1/> REGIONS: CPT

## 2022-12-30 NOTE — PROGRESS NOTES
PHYSICAL THERAPY - DAILY TREATMENT NOTE    Patient Name: Evans Campbell        Date: 2022  : 1940    Patient  Verified: YES  Visit #:   15   of   24  Insurance: Payor: Rock Hillock / Plan: [place] MEDICARE PART A & B / Product Type: Medicare /      In time: 11:05 Out time: 11:55   Total Treatment Time: 50     Medicare Time Tracking (below)   Total Timed Codes (min):  40 1:1 Treatment Time:  40     TREATMENT AREA/ DIAGNOSIS = Right knee pain [M25.561]    SUBJECTIVE   Pain Level (on 0 to 10 scale):  0  / 10   Medication Changes/New allergies or changes in medical history, any new surgeries or procedures? NO    If yes, update Summary List   Subjective Functional Status/Changes:  []  No changes reported     Pt reports still having stiffness and difficulty with stairs.       OBJECTIVE  Modalities Rationale:     decrease inflammation and decrease pain to improve patient's ability to perform ADLs without pain   min [] Estim, type/location:                                      []  att     []  unatt     []  w/US     []  w/ice    []  w/heat    min []  Mechanical Traction: type/lbs                   []  pro   []  sup   []  int   []  cont    []  before manual    []  after manual    min []  Ultrasound, settings/location:      min []  Iontophoresis w/ dexamethasone, location:                                               []  take home patch       []  in clinic   10 min [x]  Ice     []  Heat    location/position: R knee    min []  Vasopneumatic Device, press/temp:    If using vaso (only need to measure limb vaso being performed on)      pre-treatment girth :       post-treatment girth :       measured at (landmark location) :      min []  Other:    [] Skin assessment post-treatment (if applicable):    []  intact    []  redness- no adverse reaction                  []redness - adverse reaction:        15 min Therapeutic Exercise:  [x]  See flow sheet   Rationale:      increase ROM and increase strength to improve the patients ability to perform ADLs without pain     10 min Manual Therapy: PROM to R knee. STM to R knee. AP mobs to tibiofemoral joint   Rationale:      decrease pain, increase ROM, and increase tissue extensibility to improve patient's ability to perform ADLs without pain  The manual therapy interventions were performed at a separate and distinct time from the therapeutic activities interventions      15 min Therapeutic Activity: [x]  See flow sheet   Rationale:     functional activities  to improve the patients ability to perform ADLs without pain    Billed With/As:   [x] TE   [] TA   [] Neuro   [] Self Care Patient Education: [x] Review HEP    [] Progressed/Changed HEP based on:   [] positioning   [] body mechanics   [] transfers   [] heat/ice application    [] other:        Other Objective/Functional Measures:    AROM flexion: 111deg  PROM flexion after manual therapy: 122deg   Post Treatment Pain Level (on 0 to 10) scale:   0  / 10     ASSESSMENT  Assessment/Changes in Function:     Pt showing improved mobility. Pt still demonstrating decreased functional strength based on vaulting with step ups     []  See Progress Note/Recertification   Patient will continue to benefit from skilled PT services to modify and progress therapeutic interventions, address functional mobility deficits, address ROM deficits, address strength deficits, analyze and address soft tissue restrictions, and analyze and cue movement patterns to attain remaining goals.    Progress toward goals / Updated goals:    Good Progress to    [] STG    [x] LTG  1 as shown by improved overall mobility needed for ADLs     PLAN  [x]  Upgrade activities as tolerated YES Continue plan of care   []  Discharge due to :    []  Other:      Therapist: Dileep Luther DPT     Date: 12/30/2022 Time: 12:14 PM        Future Appointments   Date Time Provider Jim Pendleton   1/4/2023 11:00 AM [facility]6/2023 10:30 AM [facility]   1/9/2023 11:00 AM Raymundo Ear [facility]   1/11/2023 11:00 AM Raymundo Ear [facility]   1/13/2023 10:30 AM Raymundo Ear [facility]   1[facility]16/2023 11:00 AM [facility]   1/18/2023 11:00 AM Raymundo Ear [facility]   1/20/2023 10:30 AM Raymundo Ear [facility]   1/23/2023 11:00 AM Raymundo Ear [facility]   1/25/2023 11:00 AM Raymundo Ear [facility]   1/27/2023 10:30 AM Raymundo Ear [facility]

## 2023-01-01 NOTE — PROGRESS NOTES
PHYSICAL THERAPY - DAILY TREATMENT NOTE    Patient Name: Baudilio Riggins        Date: 9/15/2021  : 1940    Patient  Verified: YES  Visit #:   15   of   14  Insurance: Payor: Abdoul Melendrez / Plan: [place] MEDICARE PART A & B / Product Type: Medicare /      In time: 10:50 Out time: 11;30   Total Treatment Time: 40     Medicare Time Tracking (below)   Total Timed Codes (min):  40 1:1 Treatment Time:  40     TREATMENT AREA/ DIAGNOSIS = Other abnormalities of gait and mobility [R26.89]    SUBJECTIVE   Pain Level (on 0 to 10 scale):  0  / 10   Medication Changes/New allergies or changes in medical history, any new surgeries or procedures? NO    If yes, update Summary List   Subjective Functional Status/Changes:  []  No changes reported     Pt reports still having difficulty with going up and down stairs. OBJECTIVE      10 min Therapeutic Exercise:  [x]  See flow sheet   Rationale:      increase ROM and increase strength to improve the patients ability to perform ADLs without pain       30 min Neuromuscular Re-ed: [x]  See flow sheet   Rationale:    improve coordination, improve balance and increase proprioception to improve the patients ability to perform ADLs without pain    Billed With/As:   [x] TE   [] TA   [] Neuro   [] Self Care Patient Education: [x] Review HEP    [] Progressed/Changed HEP based on:   [] positioning   [] body mechanics   [] transfers   [] heat/ice application    [] other:        Other Objective/Functional Measures:    Pt demonstrating decreased LE strength needed for stair ambulation   Post Treatment Pain Level (on 0 to 10) scale:   0  / 10     ASSESSMENT  Assessment/Changes in Function:     Pt showing improved overall static balance.  Pt still demonstrates increased sway difficulty with      []  See Progress Note/Recertification   Patient will continue to benefit from skilled PT services to modify and progress therapeutic interventions, address functional mobility deficits, address ROM deficits, address strength deficits and analyze and address soft tissue restrictions to attain remaining goals.    Progress toward goals / Updated goals:    Good Progress to    [] STG    [x] LTG  1 as shown by improved safety with ADLs     PLAN  [x]  Upgrade activities as tolerated YES Continue plan of care   []  Discharge due to :    []  Other:      Therapist: Damion Garcia DPT     Date: 9/15/2021 Time: 12:18 PM        Future Appointments   Date Time Provider Jim Pendleton   9/17/2021 12:45 PM Mercie Pronto [facility] Name band; 38.6

## 2023-01-04 ENCOUNTER — HOSPITAL ENCOUNTER (OUTPATIENT)
Dept: PHYSICAL THERAPY | Age: 83
Discharge: HOME | End: 2023-01-04
Payer: MEDICARE

## 2023-01-04 PROCEDURE — 97110 THERAPEUTIC EXERCISES: CPT

## 2023-01-04 PROCEDURE — 97140 MANUAL THERAPY 1/> REGIONS: CPT

## 2023-01-04 NOTE — PROGRESS NOTES
PHYSICAL THERAPY - DAILY TREATMENT NOTE    Patient Name: Gillian Benitez        Date: 2023  : 1940    Patient  Verified: YES  Visit #:   15   of   20  Insurance: Payor: Gutierrez Solano / Plan: [place] MEDICARE PART A & B / Product Type: Medicare /      In time: 11:15 Out time: 12:05   Total Treatment Time: 50     Medicare Time Tracking (below)   Total Timed Codes (min):  40 1:1 Treatment Time:  25     TREATMENT AREA/ DIAGNOSIS = Right knee pain [M25.561]    SUBJECTIVE   Pain Level (on 0 to 10 scale):  0  / 10   Medication Changes/New allergies or changes in medical history, any new surgeries or procedures? NO    If yes, update Summary List   Subjective Functional Status/Changes:  []  No changes reported     Pt reports that she is still having difficulty with walking.  Pt states that she is still stiff all the time after sitting for prolonged periods     OBJECTIVE  Modalities Rationale:     decrease pain to improve patient's ability to perform ADLs without pain     min [] Estim, type/location:                                      []  att     []  unatt     []  w/US     []  w/ice    []  w/heat    min []  Mechanical Traction: type/lbs                   []  pro   []  sup   []  int   []  cont    []  before manual    []  after manual    min []  Ultrasound, settings/location:      min []  Iontophoresis w/ dexamethasone, location:                                               []  take home patch       []  in clinic   10 min [x]  Ice     []  Heat    location/position: R knee    min []  Vasopneumatic Device, press/temp:    If using vaso (only need to measure limb vaso being performed on)      pre-treatment girth :       post-treatment girth :       measured at (landmark location) :      min []  Other:    [] Skin assessment post-treatment (if applicable):    []  intact    []  redness- no adverse reaction                  []redness - adverse reaction:        20 min Therapeutic Exercise:  [x]  See flow sheet   Rationale: increase ROM and increase strength to improve the patients ability to perform ADLs without pain     10 min Manual Therapy: PROM to R knee. AP tibiofemoral mobs grade 3-4   Rationale:      decrease pain and increase ROM to improve patient's ability to perform ADLs without pain  The manual therapy interventions were performed at a separate and distinct time from the therapeutic activities interventions      10 min Therapeutic Activity: [x]  See flow sheet   Rationale:     functional activities  to improve the patients ability to perform ADLs without pain    Billed With/As:   [x] TE   [] TA   [] Neuro   [] Self Care Patient Education: [x] Review HEP    [] Progressed/Changed HEP based on:   [] positioning   [] body mechanics   [] transfers   [] heat/ice application    [] other:        Other Objective/Functional Measures:    PROM flexion: 118 after manual therap techniques   Post Treatment Pain Level (on 0 to 10) scale:   0  / 10     ASSESSMENT  Assessment/Changes in Function:     Pt showing improved overall strength. Pt still having decreased  mobility in the knee overall     []  See Progress Note/Recertification   Patient will continue to benefit from skilled PT services to modify and progress therapeutic interventions, address functional mobility deficits, address ROM deficits, address strength deficits, analyze and address soft tissue restrictions, and analyze and cue movement patterns to attain remaining goals.    Progress toward goals / Updated goals:    Good Progress to    [] STG    [x] LTG  1 as shown by improved overall pain levels with ADLs     PLAN  [x]  Upgrade activities as tolerated YES Continue plan of care   []  Discharge due to :    []  Other:      Therapist: Susan Torres DPT     Date: 1/4/2023 Time: 11:36 AM        Future Appointments   Date Time Provider Jim Pendleton   1/6/2023 10:30 AM Genia Rattler [facility]   1/9/2023 11:00 AM Genia Rattler [facility]   1/11/2023 11:00 AM Yeison Dafne [facility]   1/13/2023 10:30 AM [facility]   1/16/2023 11[facility]00 AM [facility]   1[facility]18/2023 11:00 AM [facility]   1[facility]20/2023 10:30 AM [facility]   1[facility]23/2023 11:00 AM [facility]   1/25/2023 11:00 AM [facility]   1/27/2023 10:30 AM [facility]

## 2023-01-06 ENCOUNTER — HOSPITAL ENCOUNTER (OUTPATIENT)
Dept: PHYSICAL THERAPY | Age: 83
Discharge: HOME | End: 2023-01-06
Payer: MEDICARE

## 2023-01-06 PROCEDURE — 97112 NEUROMUSCULAR REEDUCATION: CPT

## 2023-01-06 PROCEDURE — 97140 MANUAL THERAPY 1/> REGIONS: CPT

## 2023-01-06 NOTE — PROGRESS NOTES
PHYSICAL THERAPY - DAILY TREATMENT NOTE    Patient Name: Ervin Epps        Date: 2023  : 1940    Patient  Verified: YES  Visit #:     Insurance: Payor: Rosario Gallo / Plan: [place] MED[place] PART A & B / Product Type: Medicare /      In time: 10:30 Out time: 11:35   Total Treatment Time: 65     Medicare Time Tracking (below)   Total Timed Codes (min):  55 1:1 Treatment Time:  30     TREATMENT AREA/ DIAGNOSIS = Right knee pain [M25.561]    SUBJECTIVE   Pain Level (on 0 to 10 scale):  0-2  / 10   Medication Changes/New allergies or changes in medical history, any new surgeries or procedures? NO    If yes, update Summary List   Subjective Functional Status/Changes:  []  No changes reported     Pt reports that she is still having a lot of stiffness and discomfort after prolonged positions (sitting and sleeping).  Improved pain levels     OBJECTIVE  Modalities Rationale:     decrease inflammation and decrease pain to improve patient's ability to perform ADLs without pain   min [] Estim, type/location:                                      []  att     []  unatt     []  w/US     []  w/ice    []  w/heat    min []  Mechanical Traction: type/lbs                   []  pro   []  sup   []  int   []  cont    []  before manual    []  after manual    min []  Ultrasound, settings/location:      min []  Iontophoresis w/ dexamethasone, location:                                               []  take home patch       []  in clinic   10 min [x]  Ice     []  Heat    location/position:     min []  Vasopneumatic Device, press/temp:    If using vaso (only need to measure limb vaso being performed on)      pre-treatment girth :       post-treatment girth :       measured at (landmark location) :      min []  Other:    [] Skin assessment post-treatment (if applicable):    []  intact    []  redness- no adverse reaction                  []redness - adverse reaction:        30 min Therapeutic Exercise:  [x]  See flow sheet   Rationale:      increase ROM and increase strength to improve the patients ability to perform ADLs without pain     10 min Manual Therapy: PROM to R knee. AP tibiofemoral mobs   Rationale:      decrease pain, increase ROM, and increase tissue extensibility to improve patient's ability to perform ADLs without pain  The manual therapy interventions were performed at a separate and distinct time from the therapeutic activities interventions      15 min Neuromuscular Re-ed: [x]  See flow sheet   Rationale:    improve coordination, improve balance, and increase proprioception to improve the patients ability to perform ADLs without pain    Billed With/As:   [x] TE   [] TA   [] Neuro   [] Self Care Patient Education: [x] Review HEP    [] Progressed/Changed HEP based on:   [] positioning   [] body mechanics   [] transfers   [] heat/ice application    [] other:        Other Objective/Functional Measures:    PROM 121deg after manual therapy   Post Treatment Pain Level (on 0 to 10) scale:   0  / 10     ASSESSMENT  Assessment/Changes in Function:     Pt showing improved overall mobility. Pt still lacking good functional strength in RLE     []  See Progress Note/Recertification   Patient will continue to benefit from skilled PT services to modify and progress therapeutic interventions, address functional mobility deficits, address ROM deficits, address strength deficits, analyze and address soft tissue restrictions, and analyze and modify body mechanics/ergonomics to attain remaining goals.    Progress toward goals / Updated goals:    Good Progress to    [] STG    [x] LTG  1 as shown by improved pain levels needed for ADLs     PLAN  [x]  Upgrade activities as tolerated YES Continue plan of care   []  Discharge due to :    []  Other:      Therapist: Otilio Burnette DPT     Date: 1/6/2023 Time: 10:27 AM        Future Appointments   Date Time Provider Jim Pendleton   1/6/2023 10:30 AM Tommy Florence [facility] 1/9/2023 11:00 AM Caleb Finger [facility]   1/11/2023 11:00 AM [facility]   1/13/2023 10:30 AM [facility]   1/16/2023 11:00 AM [facility]   1/18/2023 11:00 AM [facility]   1/20/2023 10:30 AM [facility]   1/23/2023 11:00 AM Caleb Finger [facility]   1/25/2023 11:00 AM [facility]   1/27/2023 10:30 AM [facility]

## 2023-01-09 ENCOUNTER — HOSPITAL ENCOUNTER (OUTPATIENT)
Dept: PHYSICAL THERAPY | Age: 83
Discharge: HOME | End: 2023-01-09
Payer: MEDICARE

## 2023-01-09 PROCEDURE — 97110 THERAPEUTIC EXERCISES: CPT

## 2023-01-09 PROCEDURE — 97140 MANUAL THERAPY 1/> REGIONS: CPT

## 2023-01-09 NOTE — PROGRESS NOTES
PHYSICAL THERAPY - DAILY TREATMENT NOTE    Patient Name: Christin Garcia        Date: 2023  : 1940    Patient  Verified: YES  Visit #:     Insurance: Payor: Nydia Spence / Plan: [place] MEDICARE PART A & B / Product Type: Medicare /      In time: 11:05 Out time: 12:05   Total Treatment Time: 60     Medicare Time Tracking (below)   Total Timed Codes (min):  50 1:1 Treatment Time:  30     TREATMENT AREA/ DIAGNOSIS = Right knee pain [M25.561]    SUBJECTIVE   Pain Level (on 0 to 10 scale):  0-2  / 10   Medication Changes/New allergies or changes in medical history, any new surgeries or procedures?     NO    If yes, update Summary List   Subjective Functional Status/Changes:  []  No changes reported     Pt reports difficulty walking due to stiffness      OBJECTIVE  Modalities Rationale:     decrease inflammation and decrease pain to improve patient's ability to perform ADLs without pain   min [] Estim, type/location:                                      []  att     []  unatt     []  w/US     []  w/ice    []  w/heat    min []  Mechanical Traction: type/lbs                   []  pro   []  sup   []  int   []  cont    []  before manual    []  after manual    min []  Ultrasound, settings/location:      min []  Iontophoresis w/ dexamethasone, location:                                               []  take home patch       []  in clinic   10 min [x]  Ice     []  Heat    location/position: R knee    min []  Vasopneumatic Device, press/temp:    If using vaso (only need to measure limb vaso being performed on)      pre-treatment girth :       post-treatment girth :       measured at (landmark location) :      min []  Other:    [] Skin assessment post-treatment (if applicable):    []  intact    []  redness- no adverse reaction                  []redness - adverse reaction:        25 min Therapeutic Exercise:  [x]  See flow sheet   Rationale:      increase ROM and increase strength to improve the patients ability to perform ADLs without pain     15 min Manual Therapy: PROM to R knee. AP mobs to tibiofemoral joint   Rationale:      decrease pain, increase ROM, and increase tissue extensibility to improve patient's ability to perform ADLs without pain  The manual therapy interventions were performed at a separate and distinct time from the therapeutic activities interventions    10 min Neuromuscular Re-ed: [x]  See flow sheet   Rationale:    improve coordination, improve balance, and increase proprioception to improve the patients ability to perform ADLs without pain    Billed With/As:   [x] TE   [] TA   [] Neuro   [] Self Care Patient Education: [x] Review HEP    [] Progressed/Changed HEP based on:   [] positioning   [] body mechanics   [] transfers   [] heat/ice application    [] other:        Other Objective/Functional Measures:    AROM flexion: 108 pre manual therapy     Post Treatment Pain Level (on 0 to 10) scale:   0  / 10     ASSESSMENT  Assessment/Changes in Function:     Pt showing improved overall mobility needed for ADLs     []  See Progress Note/Recertification   Patient will continue to benefit from skilled PT services to modify and progress therapeutic interventions, address functional mobility deficits, address ROM deficits, address strength deficits, analyze and address soft tissue restrictions, and analyze and cue movement patterns to attain remaining goals.    Progress toward goals / Updated goals:    Good Progress to    [] STG    [x] LTG  1 as shown by improved overall pain levels with ADLs and improved ability to walk     PLAN  [x]  Upgrade activities as tolerated YES Continue plan of care   []  Discharge due to :    []  Other:      Therapist: Sarah Jackson DPT     Date: 1/9/2023 Time: 7:58 AM        Future Appointments   Date Time Provider Jim Pendleton   1/9/2023 11:00 AM Ramond Drier [facility]   1/11/2023 11:00 AM Ramond Drier [facility]   1/13/2023 10:30 AM [facility]   1/16/2023 11:00 AM [facility]   1/18/2023 11:00 AM [facility]   1/20/2023 10:30 AM [facility]   1/23/2023 11:00 AM [facility]   1/25/2023 11:00 AM [facility]   1/27/2023 10:30 AM Ainsley Furlong [facility]

## 2023-01-11 ENCOUNTER — HOSPITAL ENCOUNTER (OUTPATIENT)
Dept: PHYSICAL THERAPY | Age: 83
Discharge: HOME | End: 2023-01-11
Payer: MEDICARE

## 2023-01-11 PROCEDURE — 97110 THERAPEUTIC EXERCISES: CPT

## 2023-01-11 PROCEDURE — 97140 MANUAL THERAPY 1/> REGIONS: CPT

## 2023-01-11 PROCEDURE — 97112 NEUROMUSCULAR REEDUCATION: CPT

## 2023-01-11 NOTE — PROGRESS NOTES
PHYSICAL THERAPY - DAILY TREATMENT NOTE    Patient Name: Cindi Burkett        Date: 2023  : 1940    Patient  Verified: YES  Visit #:     Insurance: Payor: Ariadne Roberson / Plan: [place] MEDICARE PART A & B / Product Type: Medicare /      In time: 11:00 Out time: 12:00   Total Treatment Time: 60     Medicare Time Tracking (below)   Total Timed Codes (min):  50 1:1 Treatment Time:  50     TREATMENT AREA/ DIAGNOSIS = Right knee pain [M25.561]    SUBJECTIVE   Pain Level (on 0 to 10 scale):  0-2  / 10   Medication Changes/New allergies or changes in medical history, any new surgeries or procedures? NO    If yes, update Summary List   Subjective Functional Status/Changes:  []  No changes reported     Pt reports difficulty with walking due to if being stiff all the time.  Pt states that pain with walking has improved       OBJECTIVE  Modalities Rationale:     decrease inflammation and decrease pain to improve patient's ability to perform ADLs without pain   min [] Estim, type/location:                                      []  att     []  unatt     []  w/US     []  w/ice    []  w/heat    min []  Mechanical Traction: type/lbs                   []  pro   []  sup   []  int   []  cont    []  before manual    []  after manual    min []  Ultrasound, settings/location:      min []  Iontophoresis w/ dexamethasone, location:                                               []  take home patch       []  in clinic   10 min [x]  Ice     []  Heat    location/position: R knee    min []  Vasopneumatic Device, press/temp:    If using vaso (only need to measure limb vaso being performed on)      pre-treatment girth :       post-treatment girth :       measured at (landmark location) :      min []  Other:    [] Skin assessment post-treatment (if applicable):    []  intact    []  redness- no adverse reaction                  []redness - adverse reaction:        20 min Therapeutic Exercise:  [x]  See flow sheet Rationale:      increase ROM and increase strength to improve the patients ability to perform ADLs without pain     15 min Manual Therapy: PROM to R knee. Scar massage. AP tibiofemoral mobs   Rationale:      decrease pain, increase ROM, and increase tissue extensibility to improve patient's ability to perform ADLs without pain  The manual therapy interventions were performed at a separate and distinct time from the therapeutic activities interventions       min Therapeutic Activity: [x]  See flow sheet   Rationale:     functional activities  to improve the patients ability to perform ADLs without pain      15 min Neuromuscular Re-ed: [x]  See flow sheet   Rationale:    improve coordination, improve balance, and increase proprioception to improve the patients ability to perform ADLs without pain    Billed With/As:   [x] TE   [] TA   [] Neuro   [] Self Care Patient Education: [x] Review HEP    [] Progressed/Changed HEP based on:   [] positioning   [] body mechanics   [] transfers   [] heat/ice application    [] other:        Other Objective/Functional Measures:    Knee flexion: 120deg after manual therapy techniques   Post Treatment Pain Level (on 0 to 10) scale:   0-2  / 10     ASSESSMENT  Assessment/Changes in Function:     Pt showing improved overall mobility. Pt still showing decreased strength for stairs      []  See Progress Note/Recertification   Patient will continue to benefit from skilled PT services to modify and progress therapeutic interventions, address functional mobility deficits, address ROM deficits, address strength deficits, analyze and address soft tissue restrictions, analyze and cue movement patterns, and analyze and modify body mechanics/ergonomics to attain remaining goals.    Progress toward goals / Updated goals:    Good Progress to    [] STG    [x] LTG  1 as shown by improved overall pain levels with ADLs     PLAN  [x]  Upgrade activities as tolerated YES Continue plan of care   [] Discharge due to :    []  Other:      Therapist: Romelia Solano DPT     Date: 1/11/2023 Time: 9:16 AM        Future Appointments   Date Time Provider Jim Pendleton   1/11/2023 11:00 AM Elden Moloney [facility]   1/13/2023 10:30 AM Elden Moloney [facility]   1/16/2023 11:00 AM Elden Moloney [facility]   1/18/2023 11:00 AM Elden Moloney [facility]   1/20/2023 10:30 AM Elden Moloney [facility]   1/23/2023 11:00 AM Elden Moloney [facility]   1/25/2023 11:00 AM Elden Moloney [facility]   1/27/2023 10:30 AM Elden Moloney [facility]

## 2023-01-12 ENCOUNTER — HOSPITAL ENCOUNTER (OUTPATIENT)
Dept: PHYSICAL THERAPY | Age: 83
Discharge: HOME | End: 2023-01-12
Payer: MEDICARE

## 2023-01-12 PROCEDURE — 97140 MANUAL THERAPY 1/> REGIONS: CPT

## 2023-01-12 PROCEDURE — 97110 THERAPEUTIC EXERCISES: CPT

## 2023-01-12 NOTE — PROGRESS NOTES
PHYSICAL THERAPY - DAILY TREATMENT NOTE     Patient Name: Hoang Medina        Date: 2023  : 1940   YES Patient  Verified  Visit #:     Insurance: Payor: Andre Mcneil / Plan: [place] MEDICARE PART A & B / Product Type: Medicare /      In time: 8118 Out time: 4395   Total Treatment Time: 65     Medicare/BCBS Time Tracking (below)   Total Timed Codes (min):  55 1:1 Treatment Time:  25     TREATMENT AREA =  Right knee pain [M25.561]    SUBJECTIVE    Pain Level (on 0 to 10 scale):  0  / 10   Medication Changes/New allergies or changes in medical history, any new surgeries or procedures?     NO    If yes, update Summary List   Subjective Functional Status/Changes:  []  No changes reported     C/o tightness         OBJECTIVE    Modalities Rationale:     decrease inflammation and decrease pain to improve patient's ability to perform ADLs without pain                min [] Estim, type/location:                                                            []  att     []  unatt     []  w/US     []  w/ice    []  w/heat     min []  Mechanical Traction: type/lbs                    []  pro   []  sup   []  int   []  cont    []  before manual    []  after manual     min []  Ultrasound, settings/location:        min []  Iontophoresis w/ dexamethasone, location:                                                []  take home patch       []  in clinic   10 min [x]  Ice     []  Heat    location/position: R knee     min []  Vasopneumatic Device, press/temp:     If using vaso (only need to measure limb vaso being performed on)      pre-treatment girth :       post-treatment girth :       measured at (landmark location) :       min []  Other:     [] Skin assessment post-treatment (if applicable):    []  intact    []  redness- no adverse reaction                  []redness - adverse reaction:         15 min Therapeutic Exercise:  [x]  See flow sheet   Rationale:      increase ROM and increase strength to improve the patients ability to perform ADLs without pain      10 min Manual Therapy: PROM to R knee. Scar massage. AP tibiofemoral mobs   Rationale:      decrease pain, increase ROM, and increase tissue extensibility to improve patient's ability to perform ADLs without pain  The manual therapy interventions were performed at a separate and distinct time from the therapeutic activities interventions     nc min Neuromuscular Re-ed: [x]  See flow sheet   Rationale:    improve coordination, improve balance, and increase proprioception to improve the patients ability to perform ADLs without pain     Billed With/As:   [x] TE   [] TA   [x] Neuro   [] Self Care Patient Education: [x] Review HEP    [] Progressed/Changed HEP based on:   [] positioning   [] body mechanics   [] transfers   [] heat/ice application    [] other:         Other Objective/Functional Measures:    AAROM flex= 122   Post Treatment Pain Level (on 0 to 10) scale:   0  / 10     ASSESSMENT    Assessment/Changes in Function:     Less painful with amb    Easier to get in/ out of car    Stiff after periods of immobility--in a.m., getting up after sitting a long time     []  See Progress Note/Recertification    Patient will continue to benefit from skilled PT services to modify and progress therapeutic interventions, address functional mobility deficits, address ROM deficits, address strength deficits, analyze and address soft tissue restrictions, analyze and cue movement patterns, and analyze and modify body mechanics/ergonomics to attain remaining goals.    Progress toward goals / Updated goals:     Progressing with improved AAROM        PLAN    [x]  Upgrade activities as tolerated {YES) Continue plan of care   []  Discharge due to :    []  Other:      Therapist: Danilo Young PT    Date: 1/12/2023 Time: 7:39 AM     Future Appointments   Date Time Provider Jim Pendleton   1/12/2023 11:30 AM Ami Neves PT [facility]   1/16/2023 11:00 AM Yulisa Servin [facility]   1/18/2023 11:00 AM [facility]   1/20/2023 10:30 AM [facility]   1/23/2023 11:00 AM [facility]   1/25/2023 11:00 AM [facility]   1/27/2023 10:30 AM [facility]

## 2023-01-13 ENCOUNTER — APPOINTMENT (OUTPATIENT)
Dept: PHYSICAL THERAPY | Age: 83
End: 2023-01-13
Payer: MEDICARE

## 2023-01-16 ENCOUNTER — HOSPITAL ENCOUNTER (OUTPATIENT)
Dept: PHYSICAL THERAPY | Age: 83
Discharge: HOME | End: 2023-01-16
Payer: MEDICARE

## 2023-01-16 PROCEDURE — 97110 THERAPEUTIC EXERCISES: CPT

## 2023-01-16 PROCEDURE — 97530 THERAPEUTIC ACTIVITIES: CPT

## 2023-01-16 PROCEDURE — 97140 MANUAL THERAPY 1/> REGIONS: CPT

## 2023-01-16 NOTE — PROGRESS NOTES
PHYSICAL THERAPY - DAILY TREATMENT NOTE    Patient Name: Kira Medina        Date: 2023  : 1940    Patient  Verified: YES  Visit #:     Insurance: Payor: Vincent  / Plan: [place] MEDICARE PART A & B / Product Type: Medicare /      In time: 11:10 Out time: 12:00   Total Treatment Time: 50     Medicare Time Tracking (below)   Total Timed Codes (min):  40 1:1 Treatment Time:  40     TREATMENT AREA/ DIAGNOSIS = Right knee pain [M25.561]    SUBJECTIVE   Pain Level (on 0 to 10 scale):  0  / 10   Medication Changes/New allergies or changes in medical history, any new surgeries or procedures? NO    If yes, update Summary List   Subjective Functional Status/Changes:  []  No changes reported     Pt reports that the knee is still really stiff.  Pt states that she is having difficulty with stairs       OBJECTIVE  Modalities Rationale:     decrease inflammation and decrease pain to improve patient's ability to perform ADLs without pain   min [] Estim, type/location:                                      []  att     []  unatt     []  w/US     []  w/ice    []  w/heat    min []  Mechanical Traction: type/lbs                   []  pro   []  sup   []  int   []  cont    []  before manual    []  after manual    min []  Ultrasound, settings/location:      min []  Iontophoresis w/ dexamethasone, location:                                               []  take home patch       []  in clinic   10 min [x]  Ice     []  Heat    location/position:     min []  Vasopneumatic Device, press/temp:    If using vaso (only need to measure limb vaso being performed on)      pre-treatment girth :       post-treatment girth :       measured at (landmark location) :      min []  Other:    [] Skin assessment post-treatment (if applicable):    []  intact    []  redness- no adverse reaction                  []redness - adverse reaction:        15 min Therapeutic Exercise:  [x]  See flow sheet   Rationale:      increase ROM and increase strength to improve the patients ability to perform ADLs without pain     15 min Manual Therapy: PROM to R knee. AP Tibiofemoral mobs grade 3   Rationale:      decrease pain and increase ROM to improve patient's ability to perform ADLs without pain  The manual therapy interventions were performed at a separate and distinct time from the therapeutic activities interventions      10 min Therapeutic Activity: [x]  See flow sheet   Rationale:     functional activities  to improve the patients ability to perform ADLs without pain    Billed With/As:   [] TE   [] TA   [] Neuro   [] Self Care Patient Education: [x] Review HEP    [] Progressed/Changed HEP based on:   [] positioning   [] body mechanics   [] transfers   [] heat/ice application    [] other:        Other Objective/Functional Measures:    AAROM flexion: 118deg   Post Treatment Pain Level (on 0 to 10) scale:   0  / 10     ASSESSMENT  Assessment/Changes in Function:     Pt showing improved mobility. Pt has increased stiffness     []  See Progress Note/Recertification   Patient will continue to benefit from skilled PT services to modify and progress therapeutic interventions, address functional mobility deficits, address ROM deficits, address strength deficits, analyze and address soft tissue restrictions, and analyze and cue movement patterns to attain remaining goals.    Progress toward goals / Updated goals:    Good Progress to    [] STG    [x] LTG  1 as shown by improved overall pain levels with ADLs     PLAN  [x]  Upgrade activities as tolerated YES Continue plan of care   []  Discharge due to :    []  Other:      Therapist: Reji Gandhi DPT     Date: 1/16/2023 Time: 9:55 AM        Future Appointments   Date Time Provider Jim Pendleton   1/16/2023 11:00 AM Dimas Sleigh [facility]   1/18/2023 11:00 AM Dimas Sleigh [facility]   1/20/2023 10:30 AM Dimas Sleigh [facility]   1/23/2023 11:00 AM Dimas Sleigh [facility]   1/25/2023 11:00 AM Sumi Patricia [facility]   1/27/2023 10:30 AM [facility]

## 2023-01-18 ENCOUNTER — HOSPITAL ENCOUNTER (OUTPATIENT)
Dept: PHYSICAL THERAPY | Age: 83
Discharge: HOME | End: 2023-01-18
Payer: MEDICARE

## 2023-01-18 PROCEDURE — 97110 THERAPEUTIC EXERCISES: CPT

## 2023-01-18 PROCEDURE — 97140 MANUAL THERAPY 1/> REGIONS: CPT

## 2023-01-18 NOTE — PROGRESS NOTES
201 [facility] PHYSICAL THERAPY AT [facility] [address] [place], [address]  Phone: [phone]  Fax: [phone]  Nupur          Patient Name: Galdino Holloway : 1940   Treatment/Medical Diagnosis: Right knee pain [M25.561]   Onset Date: 2022    Referral Source: Jaci Goodell, PA* Start of Care [facility]): 2022   Prior Hospitalization: See Medical History Provider #: [id number]   Prior Level of Function: Pt  had R knee pain with ADLs   Comorbidities: See medical history   Medications: Verified on Patient Summary List   Visits from [facility]: 2- Missed Visits: -     Goal/Measure of Progress Goal Met? 1. Increase score on FOTO to > or = to 65 points to demo an increase in functional activity tolerance with the LE   Status at last Eval: 56 Current Status: DNA, TC yes   2. Pt will note < or = 2/10 pain with all mobility to improve comfort with ADLs   Status at last Eval: 0-3 Current Status: 0-3 no   3. Pt will demonstrate a GROC score of >/= +5 to show overall improvement in function   Status at last Eval: NA Current Status: DNA no     4. Increased AROM knee flexion to >/= 120deg for improved mobility needed for ADLs   Status at last Eval: 103 Current Status: 108 no     Key Functional Changes/Progress: Pt is s/p R TKA on 2022. Pt reports she is still very stiff when walking and getting up from standing. Pt states that she is no longer having the calf pain. Pt states that the knee is overall feeling better but also tight. AROM knee flexion 107deg.  PROM flexion after manual therapy techniques:   Problem List: pain affecting function, decrease ROM, decrease strength, edema affecting function, impaired gait/ balance, and decrease ADL/ functional abilitiies   Treatment Plan may include any combination of the following: Therapeutic exercise, Neuromuscular reeducation, Manual therapy, Therapeutic activity, Self care/home management, Electric stim unattended , Vasopneumatic device, Aquatic therapy, and Gait training  Patient Goal(s) has been updated and includes:     Goals for this certification period include and are to be achieved in   4  weeks:  Continue unmet goals above  Frequency / Duration:   Patient to be seen   2   times per week for   6    weeks:    Assessments/Recommendations: Pt to continue PT 2x a week for 6 weeks to improve mobility and strength needed for ADLs  If you have any questions/comments please contact us directly at [phone]. Thank you for allowing us to assist in the care of your patient. Therapist Signature: Zayda Pacheco Date: 3/00/8280   Certification Period:  Reporting Period: 1/18/2023-4/17/2023 12/21/2022-1/18/2023 Time: 2:12 PM   NOTE TO PHYSICIAN:  PLEASE COMPLETE THE ORDERS BELOW AND FAX TO   [facility] Physical Therapy at [place]: [phone]. If you are unable to process this request in 24 hours please contact our office: [phone].    ___ I have read the above report and request that my patient continue as recommended.   ___ I have read the above report and request that my patient continue therapy with the following changes/special instructions: ________________________________________________   ___ I have read the above report and request that my patient be discharged from therapy. Physician Signature:        Date:       Time:    EDILBERTO Bermudez*.

## 2023-01-18 NOTE — PROGRESS NOTES
PHYSICAL THERAPY - DAILY TREATMENT NOTE    Patient Name: Renay Davidson        Date: 2023  : 1940    Patient  Verified: YES  Visit #:     Insurance: Payor: Darrin Pichardo / Plan: [place] MED[place]ARE PART A & B / Product Type: Medicare /      In time: 11:00 Out time: 11:35   Total Treatment Time: 35     Medicare Time Tracking (below)   Total Timed Codes (min):  25 1:1 Treatment Time:  25     TREATMENT AREA/ DIAGNOSIS = Right knee pain [M25.561]    SUBJECTIVE   Pain Level (on 0 to 10 scale):  0  / 10   Medication Changes/New allergies or changes in medical history, any new surgeries or procedures?     NO    If yes, update Summary List   Subjective Functional Status/Changes:  []  No changes reported     See Recert      OBJECTIVE  Modalities Rationale:     decrease inflammation and decrease pain to improve patient's ability to perform ADLs without pain   min [] Estim, type/location:                                      []  att     []  unatt     []  w/US     []  w/ice    []  w/heat    min []  Mechanical Traction: type/lbs                   []  pro   []  sup   []  int   []  cont    []  before manual    []  after manual    min []  Ultrasound, settings/location:      min []  Iontophoresis w/ dexamethasone, location:                                               []  take home patch       []  in clinic   10 min [x]  Ice     []  Heat    location/position: R knee    min []  Vasopneumatic Device, press/temp:    If using vaso (only need to measure limb vaso being performed on)      pre-treatment girth :       post-treatment girth :       measured at (landmark location) :      min []  Other:    [] Skin assessment post-treatment (if applicable):    []  intact    []  redness- no adverse reaction                  []redness - adverse reaction:        10 min Therapeutic Exercise:  [x]  See flow sheet   Rationale:      increase ROM and increase strength to improve the patients ability to perform ADLs without pain 15 min Manual Therapy: STM to R knee. PROM to R knee   Rationale:      decrease pain, increase ROM, increase tissue extensibility, and decrease edema  to improve patient's ability to perform ADLs without pain  The manual therapy interventions were performed at a separate and distinct time from the therapeutic activities interventions    Billed With/As:   [x] TE   [] TA   [] Neuro   [] Self Care Patient Education: [x] Review HEP    [] Progressed/Changed HEP based on:   [] positioning   [] body mechanics   [] transfers   [] heat/ice application    [] other:        Other Objective/Functional Measures:    See Recert   Post Treatment Pain Level (on 0 to 10) scale:   0  / 10     ASSESSMENT  Assessment/Changes in Function:     See Recert     []  See Progress Note/Recertification   Patient will continue to benefit from skilled PT services to modify and progress therapeutic interventions, address functional mobility deficits, address ROM deficits, and address strength deficits to attain remaining goals.    Progress toward goals / Updated goals:     See Recert     PLAN  [x]  Upgrade activities as tolerated YES Continue plan of care   []  Discharge due to :    []  Other:      Therapist: Khadijah Tapia DPT     Date: 1/18/2023 Time: 2:10 PM        Future Appointments   Date Time Provider Jim Pendleton   1/20/2023 10:30 AM Junie Asper [facility]   1/23/2023 11:00 AM Junie Asper [facility]   1/25/2023 11:00 AM Junie Asper [facility]   1/27/2023 10:30 AM Junie Asper [facility]

## 2023-01-20 ENCOUNTER — HOSPITAL ENCOUNTER (OUTPATIENT)
Dept: PHYSICAL THERAPY | Age: 83
Discharge: HOME | End: 2023-01-20
Payer: MEDICARE

## 2023-01-20 PROCEDURE — 97112 NEUROMUSCULAR REEDUCATION: CPT

## 2023-01-20 PROCEDURE — 97110 THERAPEUTIC EXERCISES: CPT

## 2023-01-20 NOTE — PROGRESS NOTES
PHYSICAL THERAPY - DAILY TREATMENT NOTE    Patient Name: Ele Saxena        Date: 2023  : 1940    Patient  Verified: YES  Visit #:     Insurance: Payor: [employer] / Plan: [place] MEDICARE PART A & B / Product Type: Medicare /      In time: 10;30 Out time: 11:20   Total Treatment Time: 50     Medicare Time Tracking (below)   Total Timed Codes (min):  40 1:1 Treatment Time:  40     TREATMENT AREA/ DIAGNOSIS = Right knee pain [M25.561]    SUBJECTIVE   Pain Level (on 0 to 10 scale):  0  / 10   Medication Changes/New allergies or changes in medical history, any new surgeries or procedures? NO    If yes, update Summary List   Subjective Functional Status/Changes:  []  No changes reported     Pt reports still very stiff when trying to walk.   Improved overall standing     OBJECTIVE  Modalities Rationale:     decrease inflammation and decrease pain to improve patient's ability to perform ADLs without pain   min [] Estim, type/location:                                      []  att     []  unatt     []  w/US     []  w/ice    []  w/heat    min []  Mechanical Traction: type/lbs                   []  pro   []  sup   []  int   []  cont    []  before manual    []  after manual    min []  Ultrasound, settings/location:      min []  Iontophoresis w/ dexamethasone, location:                                               []  take home patch       []  in clinic    min []  Ice     []  Heat    location/position:     min []  Vasopneumatic Device, press/temp:    If using vaso (only need to measure limb vaso being performed on)      pre-treatment girth :       post-treatment girth :       measured at (landmark location) :      min []  Other:    [] Skin assessment post-treatment (if applicable):    []  intact    []  redness- no adverse reaction                  []redness - adverse reaction:        15 min Therapeutic Exercise:  [x]  See flow sheet   Rationale:      increase ROM and increase strength to improve the patients ability to perform ADLs without pain      15 min Manual Therapy: PROM to R knee. STM to R knee   Rationale:      decrease pain, increase ROM, and increase tissue extensibility to improve patient's ability to perform ADLs without pain  The manual therapy interventions were performed at a separate and distinct time from the therapeutic activities interventions      10 min Neuromuscular Re-ed: [x]  See flow sheet   Rationale:    improve balance and increase proprioception to improve the patients ability to perform ADLs without pain    Billed With/As:   [x] TE   [] TA   [] Neuro   [] Self Care Patient Education: [x] Review HEP    [] Progressed/Changed HEP based on:   [] positioning   [] body mechanics   [] transfers   [] heat/ice application    [] other:        Other Objective/Functional Measures:    PROM to R knee: 115deg   Post Treatment Pain Level (on 0 to 10) scale:   0  / 10     ASSESSMENT  Assessment/Changes in Function:     Pt showing improved overall functional strength. Pt still having some difficulty with stairs     []  See Progress Note/Recertification   Patient will continue to benefit from skilled PT services to modify and progress therapeutic interventions, address functional mobility deficits, address ROM deficits, address strength deficits, analyze and address soft tissue restrictions, and analyze and cue movement patterns to attain remaining goals.    Progress toward goals / Updated goals:    Good Progress to    [] STG    [x] LTG  1 as shown by improved overall pain levels with ADLs     PLAN  [x]  Upgrade activities as tolerated YES Continue plan of care   []  Discharge due to :    []  Other:      Therapist: Amita Hankins DPT     Date: 1/20/2023 Time: 11:19 AM        Future Appointments   Date Time Provider Jim Pendleton   1/23/2023 11:00 AM Eligio China [facility]   1/25/2023 11:00 AM Eligio China [facility]   1/27/2023 10:30 AM Eligio China [facility]

## 2023-01-23 ENCOUNTER — HOSPITAL ENCOUNTER (OUTPATIENT)
Dept: PHYSICAL THERAPY | Age: 83
Discharge: HOME | End: 2023-01-23
Payer: MEDICARE

## 2023-01-23 PROCEDURE — 97140 MANUAL THERAPY 1/> REGIONS: CPT

## 2023-01-23 PROCEDURE — 97112 NEUROMUSCULAR REEDUCATION: CPT

## 2023-01-23 NOTE — PROGRESS NOTES
PHYSICAL THERAPY - DAILY TREATMENT NOTE    Patient Name: Killian Anaya        Date: 2023  : 1940    Patient  Verified: YES  Visit #:     Insurance: Payor: Jignesh Weber / Plan: [place] MEDICARE PART A & B / Product Type: Medicare /      In time: 10:50 Out time: 11:55   Total Treatment Time: 72     Medicare Time Tracking (below)   Total Timed Codes (min):  55 1:1 Treatment Time:  30     TREATMENT AREA/ DIAGNOSIS = Right knee pain [M25.561]    SUBJECTIVE   Pain Level (on 0 to 10 scale):  0  / 10   Medication Changes/New allergies or changes in medical history, any new surgeries or procedures?     NO    If yes, update Summary List   Subjective Functional Status/Changes:  []  No changes reported     See d/C summary       OBJECTIVE  Modalities Rationale:     decrease inflammation and decrease pain to improve patient's ability to perform ADLs without pain   min [] Estim, type/location:                                      []  att     []  unatt     []  w/US     []  w/ice    []  w/heat    min []  Mechanical Traction: type/lbs                   []  pro   []  sup   []  int   []  cont    []  before manual    []  after manual    min []  Ultrasound, settings/location:      min []  Iontophoresis w/ dexamethasone, location:                                               []  take home patch       []  in clinic   10 min [x]  Ice     []  Heat    location/position: R knee    min []  Vasopneumatic Device, press/temp:    If using vaso (only need to measure limb vaso being performed on)      pre-treatment girth :       post-treatment girth :       measured at (landmark location) :      min []  Other:    [] Skin assessment post-treatment (if applicable):    []  intact    []  redness- no adverse reaction                  []redness - adverse reaction:        30 min Therapeutic Exercise:  [x]  See flow sheet   Rationale:      increase ROM and increase strength to improve the patients ability to perform ADLs without pain 15 min Manual Therapy: STM to R knee   Rationale:      decrease pain, increase ROM, and increase tissue extensibility to improve patient's ability to perform ADLs without pain  The manual therapy interventions were performed at a separate and distinct time from the therapeutic activities interventions    10 min Neuromuscular Re-ed: [x]  See flow sheet   Rationale:    improve coordination and increase proprioception to improve the patients ability to perform ADLs without pain    Billed With/As:   [x] TE   [] TA   [] Neuro   [] Self Care Patient Education: [x] Review HEP    [] Progressed/Changed HEP based on:   [] positioning   [] body mechanics   [] transfers   [] heat/ice application    [] other:        Other Objective/Functional Measures:    See D/C summary    Post Treatment Pain Level (on 0 to 10) scale:   0  / 10     ASSESSMENT  Assessment/Changes in Function:     See D/c summary      []  See Progress Note/Recertification   Patient will continue to benefit from skilled PT services to modify and progress therapeutic interventions, address functional mobility deficits, address ROM deficits, address strength deficits, analyze and address soft tissue restrictions, and analyze and modify body mechanics/ergonomics to attain remaining goals.    Progress toward goals / Updated goals:    Good Progress to    [] STG    [x] LTG  1 as shown by improved mobility needed for ADLs     PLAN  [x]  Upgrade activities as tolerated YES Continue plan of care   []  Discharge due to :    []  Other:      Therapist: Deepa Davila DPT     Date: 1/23/2023 Time: 7:45 AM        Future Appointments   Date Time Provider Jim Pendleton   1/23/2023 11:00 AM Ethlyn Angers [facility]   1/25/2023 11:00 AM Ethlyn Angers [facility]   1/27/2023 10:30 AM Ethlyn Angers [facility]

## 2023-01-25 ENCOUNTER — APPOINTMENT (OUTPATIENT)
Dept: PHYSICAL THERAPY | Age: 83
End: 2023-01-25
Payer: MEDICARE

## 2023-01-25 NOTE — PROGRESS NOTES
201 [facility] PHYSICAL THERAPY AT [facility] [address] [place], [address]  Phone: [phone]  Fax: [phone]  DISCHARGE SUMMARY FOR PHYSICAL THERAPY          Patient Name: Camden Gramajo : 1940   Treatment/Medical Diagnosis: Right knee pain [M25.561]   Onset Date: 2022    Referral Source: EDILBERTO Koch* Start of Care [facility]): 2022   Prior Hospitalization: See Medical History Provider #: [id number]   Prior Level of Function: Pt had chronic R knee pain with ADLs   Comorbidities: See Medical history   Medications: Verified on Patient Summary List   Visits from [facility]: 22 Missed Visits: -       Goal/Measure of Progress Goal Met? 1. Increase score on FOTO to > or = to 65 points to demo an increase in functional activity tolerance with the LE   Status at last Eval: 66 Current Status: 60 no   2. Pt will note < or = 2/10 pain with all mobility to improve comfort with ADLs   Status at last Eval: 0-3 Current Status: 0-2 yes   3. Pt will demonstrate a GROC score of >/= +5 to show overall improvement in function   Status at last Eval: NA Current Status: +6 yes   4. Increased AROM knee flexion to >/= 120deg for improved mobility needed for ADLs   Status at last Eval: 108 Current Status: 115 no     Key Functional Changes/Progress: Pt showing great overall mobility improvement. Pt still reports having stiffness in the knee regularly. Pt states she is walking much better overall. Pt to discharge do to leaving for [place] for about 3 months. Pt give HEP to continue when out of the country. Pt educated that if she is still having trouble when she returns from [facility] to get a new referral for PT so we can continue. Assessments/Recommendations: Discontinue therapy. Progressing towards or have reached established goals. If you have any questions/comments please contact us directly at [phone].    Thank you for allowing us to assist in the care of your patient.     Therapist Signature: Rk Anne Date: 1/25/2023   Reporting Period: 1/18/2023-1/23/2023 Time: 8:35 AM

## 2023-01-27 ENCOUNTER — APPOINTMENT (OUTPATIENT)
Dept: PHYSICAL THERAPY | Age: 83
End: 2023-01-27
Payer: MEDICARE

## 2023-04-19 ENCOUNTER — HOSPITAL ENCOUNTER (OUTPATIENT)
Facility: HOSPITAL | Age: 83
Setting detail: RECURRING SERIES
Discharge: HOME | End: 2023-04-22
Payer: MEDICARE

## 2023-04-19 PROCEDURE — 98960 EDU&TRN PT SELF-MGMT NQHP 1: CPT

## 2023-04-19 PROCEDURE — 97161 PT EVAL LOW COMPLEX 20 MIN: CPT

## 2023-04-20 NOTE — PROGRESS NOTES
remaining functional goals. (see flow sheet as applicable)     Details if applicable:     10 10 09738 Self Care/Home Management (timed):  improve patient knowledge and understanding of positioning, posture/ergonomics, home safety, activity modification, diagnosis/prognosis, and physical therapy expectations, procedures and progression  to improve patient's ability to progress to PLOF and address remaining functional goals. (see flow sheet as applicable)     Details if applicable:     10 10 [facility] Totals Reminder: bill using total billable min of TIMED therapeutic procedures (example: do not include dry needle or estim unattended, both untimed codes, in totals to left)  8-22 min = 1 unit; 23-37 min = 2 units; 38-52 min = 3 units; 53-67 min = 4 units; 68-82 min = 5 units   Total Total     [x]  Patient Education billed concurrently with other procedures   [x] Review HEP    [] Progressed/Changed HEP, detail:    [] Other detail:       Objective Information/Functional Measures/Assessment    See Eval    Patient will continue to benefit from skilled PT / OT services to modify and progress therapeutic interventions, analyze and address functional mobility deficits, analyze and address ROM deficits, analyze and address strength deficits, analyze and address soft tissue restrictions, and analyze and cue for proper movement patterns to address functional deficits and attain remaining goals.     Progress toward goals / Updated goals:  []  See Progress Note/Recertification    See Eval    PLAN  Yes Continue plan of care  [x]  Upgrade activities as tolerated  []  Discharge due to :  []  Other:    Carline Morley, PT    4/20/2023    7:32 AM    Future Appointments   Date Time Provider Desi Hernandez   4/26/2023  3:00 PM [facility] [address]   4/27/2023  5:00 PM Claudiapaulette Simmons, PTA [facility] [address]   4/28/2023  1:00 PM Claudia Simmons PTA [facility] [address]   5/2/2023 10:20 AM Claudia Simmons PTA [facility] [address]   5/3/2023 11:00 AM
Complexity Rating: LOW   Problem List: pain affecting function, decrease ROM, decrease strength, impaired gait/balance, decrease ADL/functional abilities, and decrease activity tolerance   Treatment Plan may include any combination of the followin Therapeutic Exercise, 47247 Neuromuscular Re-Education, 21583 Manual Therapy, 68620 Therapeutic Activity, 18653 Self Care/Home Management, 95152 Electrical Stim unattended, 17818 Vasopneumatic Device, 13533 Gait Training, and 93105 Ultrasound  Patient / Family readiness to learn indicated by: asking questions, trying to perform skills, and interest  Persons(s) to be included in education: patient (P)  Barriers to Learning/Limitations: none  Measures taken if barriers to learning present:   Patient Goal (s): help with stiffness  Patient Self Reported Health Status: good  Rehabilitation Potential: good    Short Term Goals: To be accomplished in  4  weeks:  1. Pt will be independent and compliant with HEP to decrease pain, increase ROM and return pt to PLOF. 2. Pt will demonstrate a GROC score of >/= +2 to show overall improvement in function     Long Term Goals: To be accomplished in  8  weeks:  1. Increase score on FOTO to > or = to 65 points to demo an increase in functional activity tolerance with the LE. 2. Pt will note < or = 2/10 pain with all mobility to improve comfort with ADLs.    3. Pt will demonstrate a GROC score of >/= +5 to show overall improvement in function  Frequency / Duration:   Patient to be seen  1-3  times per week for 6-8  weeks:  Patient / Caregiver education and instruction: self care, activity modification and exercises    Therapist Signature: Carlos Hamilton PT Date:    Certification Period: 2023-2023 Time: 7:31 AM   ===========================================================================================  I certify that the above Physical Therapy Services are being furnished while the patient is under my

## 2023-04-26 ENCOUNTER — HOSPITAL ENCOUNTER (OUTPATIENT)
Facility: HOSPITAL | Age: 83
Setting detail: RECURRING SERIES
Discharge: HOME | End: 2023-04-29
Payer: MEDICARE

## 2023-04-26 PROCEDURE — 97530 THERAPEUTIC ACTIVITIES: CPT

## 2023-04-26 PROCEDURE — 97110 THERAPEUTIC EXERCISES: CPT

## 2023-04-26 NOTE — PROGRESS NOTES
PHYSICAL / OCCUPATIONAL THERAPY - DAILY TREATMENT NOTE (updated )    Patient Name: Mike Parsons    Date: 2023    : 1940  Insurance: Payor: MEDICARE / Plan: MEDICARE PART A AND B / Product Type: *No Product type* /      Patient  verified Yes     Visit #   Current / Total 2 12   Time   In / Out 3:05 pm 3:50    Pain   In / Out 0 0   Subjective Functional Status/Changes: Pt reports her  leg feels weak. Changes to:  Meds, Allergies, Med Hx, Sx Hx? If yes, update Summary List no       TREATMENT AREA =  No admission diagnoses are documented for this encounter. OBJECTIVE    Modalities Rationale:     decrease edema, decrease inflammation, decrease pain, and increase tissue extensibility to improve patient's ability to progress to PLOF and address remaining functional goals. min [] Estim Unattended, type/location:                                      []  w/ice    []  w/heat    min [] Estim Attended, type/location:                                     []  w/US     []  w/ice    []  w/heat    []  TENS insruct      min []  Mechanical Traction: type/lbs                   []  pro   []  sup   []  int   []  cont    []  before manual    []  after manual    min []  Ultrasound, settings/location:      min []  Iontophoresis w/ dexamethasone, location:                                               []  take home patch       []  in clinic   10 min  unbill [x]  Ice     []  Heat    location/position: Supine right knee    min []  Paraffin,  details:     min []  Vasopneumatic Device, press/temp:     min []  Tolentino Caty / Bonnie Setters: If using vaso (only need to measure limb vaso being performed on)      pre-treatment girth :       post-treatment girth :       measured at (landmark location) :      min []  Other:    Skin assessment post-treatment (if applicable):    [x]  intact    []  redness- no adverse reaction                 []redness - adverse reaction:         Therapeutic Procedures:     Tx Min Billable or 1:1

## 2023-04-27 ENCOUNTER — HOSPITAL ENCOUNTER (OUTPATIENT)
Facility: HOSPITAL | Age: 83
Setting detail: RECURRING SERIES
Discharge: HOME | End: 2023-04-30
Payer: MEDICARE

## 2023-04-27 PROCEDURE — 97530 THERAPEUTIC ACTIVITIES: CPT

## 2023-04-27 PROCEDURE — 97110 THERAPEUTIC EXERCISES: CPT

## 2023-04-27 PROCEDURE — 97112 NEUROMUSCULAR REEDUCATION: CPT

## 2023-04-28 ENCOUNTER — HOSPITAL ENCOUNTER (OUTPATIENT)
Facility: HOSPITAL | Age: 83
Setting detail: RECURRING SERIES
Discharge: HOME | End: 2023-05-01
Payer: MEDICARE

## 2023-04-28 PROCEDURE — 97110 THERAPEUTIC EXERCISES: CPT

## 2023-04-28 PROCEDURE — 97112 NEUROMUSCULAR REEDUCATION: CPT

## 2023-05-02 ENCOUNTER — HOSPITAL ENCOUNTER (OUTPATIENT)
Facility: HOSPITAL | Age: 83
Setting detail: RECURRING SERIES
Discharge: HOME | End: 2023-05-05
Payer: MEDICARE

## 2023-05-02 PROCEDURE — 97110 THERAPEUTIC EXERCISES: CPT

## 2023-05-02 PROCEDURE — 97112 NEUROMUSCULAR REEDUCATION: CPT

## 2023-05-02 NOTE — PROGRESS NOTES
PHYSICAL / OCCUPATIONAL THERAPY - DAILY TREATMENT NOTE (updated )    Patient Name: Suyapa Collins    Date: 2023    : 1940  Insurance: Payor: MEDICARE / Plan: MEDICARE PART A AND B / Product Type: *No Product type* /      Patient  verified Yes     Visit #   Current / Total 5 12   Time   In / Out 10:25 am 11;20   Pain   In / Out 0 0   Subjective Functional Status/Changes: Pt reports she is bothered by frequent stiffness in knee. She had a follow up with PCP yesterday. Recommending use of Voltaren as directed    Changes to:  Meds, Allergies, Med Hx, Sx Hx? If yes, update Summary List no       TREATMENT AREA =  Pain in right knee [M25.561]    OBJECTIVE    Modalities Rationale:     decrease edema, decrease inflammation, decrease pain, and increase tissue extensibility to improve patient's ability to progress to PLOF and address remaining functional goals. min [] Estim Unattended, type/location:                                      []  w/ice    []  w/heat    min [] Estim Attended, type/location:                                     []  w/US     []  w/ice    []  w/heat    []  TENS insruct      min []  Mechanical Traction: type/lbs                   []  pro   []  sup   []  int   []  cont    []  before manual    []  after manual    min []  Ultrasound, settings/location:      min []  Iontophoresis w/ dexamethasone, location:                                               []  take home patch       []  in clinic   10 min  unbill [x]  Ice     []  Heat    location/position: Supine Right knee    min []  Paraffin,  details:     min []  Vasopneumatic Device, press/temp:     min []  Kaitlin Marley / Mercedes Garcia:     If using vaso (only need to measure limb vaso being performed on)      pre-treatment girth :       post-treatment girth :       measured at (landmark location) :      min []  Other:    Skin assessment post-treatment (if applicable):    []  intact    []  redness- no adverse reaction                 []redness -

## 2023-05-03 ENCOUNTER — HOSPITAL ENCOUNTER (OUTPATIENT)
Facility: HOSPITAL | Age: 83
Setting detail: RECURRING SERIES
Discharge: HOME | End: 2023-05-06
Payer: MEDICARE

## 2023-05-03 PROCEDURE — 97530 THERAPEUTIC ACTIVITIES: CPT

## 2023-05-03 PROCEDURE — 97110 THERAPEUTIC EXERCISES: CPT

## 2023-05-03 PROCEDURE — 97140 MANUAL THERAPY 1/> REGIONS: CPT

## 2023-05-03 NOTE — PROGRESS NOTES
PHYSICAL / OCCUPATIONAL THERAPY - DAILY TREATMENT NOTE (updated )    Patient Name: Omid Precise    Date: 5/3/2023    : 1940  Insurance: Payor: MEDICARE / Plan: MEDICARE PART A AND B / Product Type: *No Product type* /      Patient  verified yes     Visit #   Current / Total 6 12   Time   In / Out 1105 12   Pain   In / Out 0 0   Subjective Functional Status/Changes: Just stiff   Changes to:  Meds, Allergies, Med Hx, Sx Hx? If yes, update Summary List no     TREATMENT AREA =  Pain in right knee [M25.561]    OBJECTIVE    Modalities Rationale:     decrease edema, decrease inflammation, and decrease pain to improve patient's ability to progress to PLOF and address remaining functional goals. min [] Estim Attended, type/location:                                     []  w/US     []  w/ice    []  w/heat    []  TENS insruct      min []  Mechanical Traction: type/lbs                   []  pro   []  sup   []  int   []  cont    []  before manual    []  after manual    min []  Ultrasound, settings/location:      min []  Iontophoresis w/ dexamethasone, location:                                               []  take home patch       []  in clinic   10 min  unbilled [x]  Ice     []  Heat    location/position:     min []  Paraffin,  details:     min []  Vasopneumatic Device, press/temp:     min []  Sandra Darden / Nataly Sleeper: If using vaso (only need to measure limb vaso being performed on)      pre-treatment girth :       post-treatment girth :       measured at (landmark location) :      min []  Other:    [x] Skin assessment post-treatment (if applicable):    [x]  intact    []  redness- no adverse reaction                 []redness - adverse reaction:        Therapeutic Procedures:   Tx Min Billable or 1:1 Min (if diff from Tx Min) Procedure, Rationale, Specifics   [id number] Therapeutic Exercise (timed):  increase ROM, strength, coordination, balance, and proprioception to improve patient's ability to progress

## 2023-05-05 ENCOUNTER — HOSPITAL ENCOUNTER (OUTPATIENT)
Facility: HOSPITAL | Age: 83
Setting detail: RECURRING SERIES
Discharge: HOME | End: 2023-05-08
Payer: MEDICARE

## 2023-05-05 PROCEDURE — 97112 NEUROMUSCULAR REEDUCATION: CPT

## 2023-05-05 PROCEDURE — 97110 THERAPEUTIC EXERCISES: CPT

## 2023-05-05 PROCEDURE — 97530 THERAPEUTIC ACTIVITIES: CPT

## 2023-05-08 ENCOUNTER — HOSPITAL ENCOUNTER (OUTPATIENT)
Facility: HOSPITAL | Age: 83
Setting detail: RECURRING SERIES
Discharge: HOME | End: 2023-05-11
Payer: MEDICARE

## 2023-05-08 PROCEDURE — 97535 SELF CARE MNGMENT TRAINING: CPT

## 2023-05-08 PROCEDURE — 97110 THERAPEUTIC EXERCISES: CPT

## 2023-05-08 NOTE — PROGRESS NOTES
PHYSICAL / OCCUPATIONAL THERAPY - DAILY TREATMENT NOTE (updated )    Patient Name: Tomi aBrbosa    Date: 2023    : 1940  Insurance: Payor: MEDICARE / Plan: MEDICARE PART A AND B / Product Type: *No Product type* /      Patient  verified Yes     Visit #   Current / Total 8 12   Time   In / Out 9:54 am 10:40   Pain   In / Out 0 0   Subjective Functional Status/Changes: Pt reports increased stiffness in right knee over the weekend after [employer] party   Changes to:  Meds, Allergies, Med Hx, Sx Hx? If yes, update Summary List no       TREATMENT AREA =  Pain in right knee [M25.561]    OBJECTIVE    Modalities Rationale:     decrease edema, decrease inflammation, decrease pain, and improve AROM  to improve patient's ability to progress to PLOF and address remaining functional goals. min [] Estim Unattended, type/location:                                      []  w/ice    []  w/heat    min [] Estim Attended, type/location:                                     []  w/US     []  w/ice    []  w/heat    []  TENS insruct      min []  Mechanical Traction: type/lbs                   []  pro   []  sup   []  int   []  cont    []  before manual    []  after manual    min []  Ultrasound, settings/location:      min []  Iontophoresis w/ dexamethasone, location:                                               []  take home patch       []  in clinic   10 min  unbill [x]  Ice     []  Heat    location/position: Supine right knee    min []  Paraffin,  details:     min []  Vasopneumatic Device, press/temp:     min []  Elizebeth Shone / Simi Angry: If using vaso (only need to measure limb vaso being performed on)      pre-treatment girth :       post-treatment girth :       measured at (landmark location) :      min []  Other:    Skin assessment post-treatment (if applicable):    [x]  intact    []  redness- no adverse reaction                 []redness - adverse reaction:         Therapeutic Procedures:     Tx Min Billable

## 2023-05-10 ENCOUNTER — HOSPITAL ENCOUNTER (OUTPATIENT)
Facility: HOSPITAL | Age: 83
Setting detail: RECURRING SERIES
Discharge: HOME | End: 2023-05-13
Payer: MEDICARE

## 2023-05-10 PROCEDURE — 97140 MANUAL THERAPY 1/> REGIONS: CPT

## 2023-05-10 PROCEDURE — 97110 THERAPEUTIC EXERCISES: CPT

## 2023-05-10 NOTE — PROGRESS NOTES
lowering on R while descending stairs  Self AAROM flexP ~ 120 degrees    Patient will continue to benefit from skilled PT / OT services to modify and progress therapeutic interventions, analyze and address functional mobility deficits, analyze and address ROM deficits, analyze and address strength deficits, analyze and address soft tissue restrictions, analyze and cue for proper movement patterns, analyze and modify for postural abnormalities, and instruct in home and community integration to address functional deficits and attain remaining goals.     Progress toward goals / Updated goals:  []  See Progress Note/Recertification    Moderate TrPt tenderness noted lateral distal ITB/proximal Tib ant   Pt education in activity modification, use of self stretching in HEP, ice prn for pain management    PLAN  Yes  Continue plan of care  [x]  Upgrade activities as tolerated  []  Discharge due to :  []  Other:    Darrell Godinez PTA    5/10/2023    11:13 AM    Future Appointments   Date Time Provider Desi Hernandez   5/11/2023  9:00 AM Dayana Pearson, PT [facility]   5/15/2023 11:40 AM Darrell Godinez PTA [facility]   5/17/2023 11:00 AM Darrell Godinez PTA [facility]   5/19/2023 11:00 AM Darrell Godinez PTA [facility]   5/23/2023 11:00 AM Darrell Godinez PTA [facility]   5/25/2023 11:40 AM Darrell Godinez PTA [facility]

## 2023-05-11 ENCOUNTER — HOSPITAL ENCOUNTER (OUTPATIENT)
Facility: HOSPITAL | Age: 83
Setting detail: RECURRING SERIES
Discharge: HOME | End: 2023-05-14
Payer: MEDICARE

## 2023-05-11 PROCEDURE — 97140 MANUAL THERAPY 1/> REGIONS: CPT

## 2023-05-11 PROCEDURE — 97035 APP MDLTY 1+ULTRASOUND EA 15: CPT

## 2023-05-11 PROCEDURE — 97110 THERAPEUTIC EXERCISES: CPT

## 2023-05-12 ENCOUNTER — APPOINTMENT (OUTPATIENT)
Facility: HOSPITAL | Age: 83
End: 2023-05-12
Payer: MEDICARE

## 2023-05-15 ENCOUNTER — HOSPITAL ENCOUNTER (OUTPATIENT)
Facility: HOSPITAL | Age: 83
Setting detail: RECURRING SERIES
Discharge: HOME | End: 2023-05-18
Payer: MEDICARE

## 2023-05-15 PROCEDURE — 97110 THERAPEUTIC EXERCISES: CPT

## 2023-05-15 PROCEDURE — 97112 NEUROMUSCULAR REEDUCATION: CPT

## 2023-05-15 PROCEDURE — 97140 MANUAL THERAPY 1/> REGIONS: CPT

## 2023-05-15 NOTE — PROGRESS NOTES
PHYSICAL / OCCUPATIONAL THERAPY - DAILY TREATMENT NOTE (updated )    Patient Name: Brody Beck    Date: 5/15/2023    : 1940  Insurance: Payor: MEDICARE / Plan: MEDICARE PART A AND B / Product Type: *No Product type* /      Patient  verified Yes     Visit #   Current / Total 11 12+8   Time   In / Out 11:44 am 12:42   Pain   In / Out 0 0   Subjective Functional Status/Changes: Pt reports she has started doing the stairs more doing house hold chores. Pain persists right knee   Changes to:  Meds, Allergies, Med Hx, Sx Hx? If yes, update Summary List no       TREATMENT AREA =  Pain in right knee [M25.561]    OBJECTIVE    Modalities Rationale:     decrease edema, decrease inflammation, and decrease pain to improve patient's ability to progress to PLOF and address remaining functional goals. min [] Estim Unattended, type/location:                                      []  w/ice    []  w/heat    min [] Estim Attended, type/location:                                     []  w/US     []  w/ice    []  w/heat    []  TENS insruct      min []  Mechanical Traction: type/lbs                   []  pro   []  sup   []  int   []  cont    []  before manual    []  after manual    min []  Ultrasound, settings/location:      min []  Iontophoresis w/ dexamethasone, location:                                               []  take home patch       []  in clinic   10 min  unbill [x]  Ice     []  Heat    location/position: Supine right knee     min []  Paraffin,  details:     min []  Vasopneumatic Device, press/temp:     min []  Britney Nickel / [place]: If using vaso (only need to measure limb vaso being performed on)      pre-treatment girth :       post-treatment girth :       measured at (landmark location) :      min []  Other:    Skin assessment post-treatment (if applicable):    [x]  intact    []  redness- no adverse reaction                 []redness - adverse reaction:         Therapeutic Procedures:     Tx Min

## 2023-05-17 ENCOUNTER — HOSPITAL ENCOUNTER (OUTPATIENT)
Facility: HOSPITAL | Age: 83
Setting detail: RECURRING SERIES
Discharge: HOME | End: 2023-05-20
Payer: MEDICARE

## 2023-05-17 PROCEDURE — 97112 NEUROMUSCULAR REEDUCATION: CPT

## 2023-05-17 PROCEDURE — 97140 MANUAL THERAPY 1/> REGIONS: CPT

## 2023-05-17 PROCEDURE — 97110 THERAPEUTIC EXERCISES: CPT

## 2023-05-17 NOTE — PROGRESS NOTES
Addended by: CHRISTIAN BARRERA on: 11/17/2022 10:57 AM     Modules accepted: Orders     PHYSICAL / OCCUPATIONAL THERAPY - DAILY TREATMENT NOTE (updated )    Patient Name: Vivian Metcalf    Date: 2023    : 1940  Insurance: Payor: MEDICARE / Plan: MEDICARE PART A AND B / Product Type: *No Product type* /      Patient  verified Yes     Visit #   Current / Total 12 12 +8   Time   In / Out 11:08 am 12:00    Pain   In / Out 0 0   Subjective Functional Status/Changes: Pt reports tenderness in lateral knee feels a little better   Changes to:  Meds, Allergies, Med Hx, Sx Hx? If yes, update Summary List no       TREATMENT AREA =  Pain in right knee [M25.561]    OBJECTIVE    Modalities Rationale:     decrease edema, decrease inflammation, decrease pain, and increase tissue extensibility to improve patient's ability to progress to PLOF and address remaining functional goals. min [] Estim Unattended, type/location:                                      []  w/ice    []  w/heat    min [] Estim Attended, type/location:                                     []  w/US     []  w/ice    []  w/heat    []  TENS insruct      min []  Mechanical Traction: type/lbs                   []  pro   []  sup   []  int   []  cont    []  before manual    []  after manual    min []  Ultrasound, settings/location:      min []  Iontophoresis w/ dexamethasone, location:                                               []  take home patch       []  in clinic   10 min  unbill [x]  Ice     []  Heat    location/position: Supine R knee    min []  Paraffin,  details:     min []  Vasopneumatic Device, press/temp:     min []  Judy Burr / Ziyad Vale: If using vaso (only need to measure limb vaso being performed on)      pre-treatment girth :       post-treatment girth :       measured at (landmark location) :      min []  Other:    Skin assessment post-treatment (if applicable):    [x]  intact    []  redness- no adverse reaction                 []redness - adverse reaction:         Therapeutic Procedures:     Tx Min Billable or 1:1 Left hip fracture

## 2023-05-19 ENCOUNTER — HOSPITAL ENCOUNTER (OUTPATIENT)
Facility: HOSPITAL | Age: 83
Setting detail: RECURRING SERIES
Discharge: HOME | End: 2023-05-22
Payer: MEDICARE

## 2023-05-19 PROCEDURE — 97112 NEUROMUSCULAR REEDUCATION: CPT

## 2023-05-19 PROCEDURE — 97110 THERAPEUTIC EXERCISES: CPT

## 2023-05-19 PROCEDURE — 97140 MANUAL THERAPY 1/> REGIONS: CPT

## 2023-05-23 ENCOUNTER — HOSPITAL ENCOUNTER (OUTPATIENT)
Facility: HOSPITAL | Age: 83
Setting detail: RECURRING SERIES
Discharge: HOME | End: 2023-05-26
Payer: MEDICARE

## 2023-05-23 PROCEDURE — 97110 THERAPEUTIC EXERCISES: CPT

## 2023-05-23 PROCEDURE — 97112 NEUROMUSCULAR REEDUCATION: CPT

## 2023-05-23 PROCEDURE — 97140 MANUAL THERAPY 1/> REGIONS: CPT

## 2023-05-23 NOTE — PROGRESS NOTES
progress therapeutic interventions, analyze and address functional mobility deficits, analyze and address ROM deficits, analyze and address strength deficits, analyze and address soft tissue restrictions, analyze and cue for proper movement patterns, analyze and modify for postural abnormalities, analyze and address imbalance/dizziness, and instruct in home and community integration to address functional deficits and attain remaining goals.     Progress toward goals / Updated goals:  []  See Progress Note/Recertification    Decreased step ups from 6 inch to 4 inch secondary to pain with improved tolerance  TrPt tenderness persists lateral distal ITB  Intermittent buckling R knee with ambulation  Pain with stairs    PLAN  Yes  Continue plan of care  [x]  Upgrade activities as tolerated  []  Discharge due to :  []  Other:    Sruthi Miramontes PTA    5/23/2023    11:48 AM    Future Appointments   Date Time Provider Desi Hernandez   5/25/2023 11:40 AM Sruthi Miramontes PTA [facility]   6/14/2023  3:00 PM Xiomy Flores, PT [facility]   6/16/2023 11:00 AM Sruthi Miramontes PTA [facility]   6/19/2023 11:40 AM Xiomy Flores, PT [facility]   6/21/2023 11:00 AM Xiomy Flores, PT [facility]   6/23/2023 11:40 AM Cass Fritz, PT [facility]   6/26/2023 11:40 AM Sruthi Miramontes PTA [facility]   6/28/2023 11:40 AM Yolanda Figueroa, PT [facility]   6/29/2023 11:40 AM Yolanda Figueroa, PT [facility]

## 2023-05-25 ENCOUNTER — HOSPITAL ENCOUNTER (OUTPATIENT)
Facility: HOSPITAL | Age: 83
Setting detail: RECURRING SERIES
Discharge: HOME | End: 2023-05-28
Payer: MEDICARE

## 2023-05-25 PROCEDURE — 97140 MANUAL THERAPY 1/> REGIONS: CPT

## 2023-05-25 PROCEDURE — 97112 NEUROMUSCULAR REEDUCATION: CPT

## 2023-05-25 PROCEDURE — 97110 THERAPEUTIC EXERCISES: CPT

## 2023-06-14 ENCOUNTER — APPOINTMENT (OUTPATIENT)
Facility: HOSPITAL | Age: 83
End: 2023-06-14
Payer: MEDICARE

## 2023-06-16 ENCOUNTER — HOSPITAL ENCOUNTER (OUTPATIENT)
Facility: HOSPITAL | Age: 83
Setting detail: RECURRING SERIES
Discharge: HOME | End: 2023-06-19
Payer: MEDICARE

## 2023-06-16 PROCEDURE — 97110 THERAPEUTIC EXERCISES: CPT

## 2023-06-16 PROCEDURE — 97140 MANUAL THERAPY 1/> REGIONS: CPT

## 2023-06-16 PROCEDURE — 97112 NEUROMUSCULAR REEDUCATION: CPT

## 2023-06-19 ENCOUNTER — HOSPITAL ENCOUNTER (OUTPATIENT)
Facility: HOSPITAL | Age: 83
Setting detail: RECURRING SERIES
Discharge: HOME | End: 2023-06-22
Payer: MEDICARE

## 2023-06-19 PROCEDURE — 97110 THERAPEUTIC EXERCISES: CPT

## 2023-06-19 PROCEDURE — 97112 NEUROMUSCULAR REEDUCATION: CPT

## 2023-06-19 PROCEDURE — 97140 MANUAL THERAPY 1/> REGIONS: CPT

## 2023-06-19 NOTE — PROGRESS NOTES
PHYSICAL / OCCUPATIONAL THERAPY - DAILY TREATMENT NOTE (updated )    Patient Name: Bin Alonso    Date: 2023    : 1940  Insurance: Payor: MEDICARE / Plan: MEDICARE PART A AND B / Product Type: *No Product type* /      Patient  verified Yes     Visit #   Current / Total 18 20   Time   In / Out 1145 am 1240   Pain   In / Out 0 0   Subjective Functional Status/Changes: Pt with increased stiffness this day, noting balance is \"off\" as well with this knee. Notes she has been working on it at home as well. TREATMENT AREA =  Pain in right knee [M25.561]    OBJECTIVE    Modalities Rationale:     decrease pain to improve patient's ability to progress to PLOF and address remaining functional goals. min [] Estim Unattended, type/location:                                      []  w/ice    []  w/heat    min [] Estim Attended, type/location:                                     []  w/US     []  w/ice    []  w/heat    []  TENS insruct      min []  Mechanical Traction: type/lbs                   []  pro   []  sup   []  int   []  cont    []  before manual    []  after manual    min []  Ultrasound, settings/location:      min []  Iontophoresis w/ dexamethasone, location:                                               []  take home patch       []  in clinic   10 min  unbill [x]  Ice     []  Heat    location/position: Supine R knee    min []  Paraffin,  details:     min []  Vasopneumatic Device, press/temp:     min []  Mary Ann Lofts / [place] Achilles: If using vaso (only need to measure limb vaso being performed on)      pre-treatment girth :       post-treatment girth :       measured at (landmark location) :      min []  Other:    Skin assessment post-treatment (if applicable):    [x]  intact    []  redness- no adverse reaction                 []redness - adverse reaction:         Therapeutic Procedures:     Tx Min Billable or 1:1 Min (if diff from Tx Min) Procedure, Rationale, Specifics   20  [id number] Therapeutic

## 2023-06-21 ENCOUNTER — HOSPITAL ENCOUNTER (OUTPATIENT)
Facility: HOSPITAL | Age: 83
Setting detail: RECURRING SERIES
Discharge: HOME | End: 2023-06-24
Payer: MEDICARE

## 2023-06-21 PROCEDURE — 97110 THERAPEUTIC EXERCISES: CPT

## 2023-06-21 PROCEDURE — 97112 NEUROMUSCULAR REEDUCATION: CPT

## 2023-06-21 PROCEDURE — 97140 MANUAL THERAPY 1/> REGIONS: CPT

## 2023-06-21 NOTE — PROGRESS NOTES
PHYSICAL / OCCUPATIONAL THERAPY - DAILY TREATMENT NOTE (updated )    Patient Name: Tonio Knowles    Date: 2023    : 1940  Insurance: Payor: MEDICARE / Plan: MEDICARE PART A AND B / Product Type: *No Product type* /      Patient  verified Yes     Visit #   Current / Total 19 20   Time   In / Out 1105 am 1155 am   Pain   In / Out 0 0   Subjective Functional Status/Changes: Pt with more stiffness this am secondary to weather but overall improvement. Balance is still very limited. TREATMENT AREA =  Pain in right knee [M25.561]    OBJECTIVE    Modalities Rationale:     decrease pain to improve patient's ability to progress to PLOF and address remaining functional goals. min [] Estim Unattended, type/location:                                      []  w/ice    []  w/heat    min [] Estim Attended, type/location:                                     []  w/US     []  w/ice    []  w/heat    []  TENS insruct      min []  Mechanical Traction: type/lbs                   []  pro   []  sup   []  int   []  cont    []  before manual    []  after manual    min []  Ultrasound, settings/location:      min []  Iontophoresis w/ dexamethasone, location:                                               []  take home patch       []  in clinic   10 min  unbill [x]  Ice     []  Heat    location/position: Supine R knee    min []  Paraffin,  details:     min []  Vasopneumatic Device, press/temp:     min []  Peter Hinson / Cal Guillermina: If using vaso (only need to measure limb vaso being performed on)      pre-treatment girth :       post-treatment girth :       measured at (landmark location) :      min []  Other:    Skin assessment post-treatment (if applicable):    [x]  intact    []  redness- no adverse reaction                 []redness - adverse reaction:         Therapeutic Procedures:     Tx Min Billable or 1:1 Min (if diff from Tx Min) Procedure, Rationale, Specifics   20 [phone] [id number] Therapeutic Exercise (timed):  increase

## 2023-06-23 ENCOUNTER — APPOINTMENT (OUTPATIENT)
Facility: HOSPITAL | Age: 83
End: 2023-06-23
Payer: MEDICARE

## 2023-06-26 ENCOUNTER — HOSPITAL ENCOUNTER (OUTPATIENT)
Facility: HOSPITAL | Age: 83
Setting detail: RECURRING SERIES
Discharge: HOME | End: 2023-06-29
Payer: MEDICARE

## 2023-06-26 PROCEDURE — 97110 THERAPEUTIC EXERCISES: CPT

## 2023-06-26 PROCEDURE — 97112 NEUROMUSCULAR REEDUCATION: CPT

## 2023-06-26 PROCEDURE — 97140 MANUAL THERAPY 1/> REGIONS: CPT

## 2023-06-28 ENCOUNTER — HOSPITAL ENCOUNTER (OUTPATIENT)
Facility: HOSPITAL | Age: 83
Setting detail: RECURRING SERIES
Discharge: HOME | End: 2023-07-01
Payer: MEDICARE

## 2023-06-28 PROCEDURE — 97140 MANUAL THERAPY 1/> REGIONS: CPT

## 2023-06-28 PROCEDURE — 97110 THERAPEUTIC EXERCISES: CPT

## 2023-06-29 ENCOUNTER — HOSPITAL ENCOUNTER (OUTPATIENT)
Facility: HOSPITAL | Age: 83
Setting detail: RECURRING SERIES
End: 2023-06-29
Payer: MEDICARE

## 2023-06-29 PROCEDURE — 97112 NEUROMUSCULAR REEDUCATION: CPT

## 2023-06-29 PROCEDURE — 97140 MANUAL THERAPY 1/> REGIONS: CPT

## 2023-06-29 PROCEDURE — 97110 THERAPEUTIC EXERCISES: CPT

## 2023-06-30 ENCOUNTER — APPOINTMENT (OUTPATIENT)
Facility: HOSPITAL | Age: 83
End: 2023-06-30
Payer: MEDICARE

## 2023-07-03 ENCOUNTER — HOSPITAL ENCOUNTER (OUTPATIENT)
Facility: HOSPITAL | Age: 83
Setting detail: RECURRING SERIES
Discharge: HOME | End: 2023-07-06
Payer: MEDICARE

## 2023-07-03 PROCEDURE — 97110 THERAPEUTIC EXERCISES: CPT

## 2023-07-03 PROCEDURE — 97530 THERAPEUTIC ACTIVITIES: CPT

## 2023-07-03 PROCEDURE — 97112 NEUROMUSCULAR REEDUCATION: CPT

## 2023-07-03 NOTE — PROGRESS NOTES
PHYSICAL / OCCUPATIONAL THERAPY - DAILY TREATMENT NOTE (updated )    Patient Name: Nicole Elizondo    Date: 7/3/2023    : 1940  Insurance: Payor: MEDICARE / Plan: MEDICARE PART A AND B / Product Type: *No Product type* /      Patient  verified Yes   Visit #   Current / Total 23 30   Time   In / Out 11:05 11:55   Pain   In / Out 1 0   Subjective Functional Status/Changes: Pt reports still having stiffness. Pt reports difficulty with stairs and steps     TREATMENT AREA =  Pain in right knee [M25.561]    OBJECTIVE    Modalities Rationale:     decrease inflammation and decrease pain to improve patient's ability to progress to PLOF and address remaining functional goals. min [] Estim Unattended, type/location:                                      []  w/ice    []  w/heat    min [] Estim Attended, type/location:                                     []  w/US     []  w/ice    []  w/heat    []  TENS insruct      min []  Mechanical Traction: type/lbs                   []  pro   []  sup   []  int   []  cont    []  before manual    []  after manual    min []  Ultrasound, settings/location:      min []  Iontophoresis w/ dexamethasone, location:                                               []  take home patch       []  in clinic   10 min  unbill [x]  Ice     []  Heat    location/position: R knee    min []  Paraffin,  details:     min []  Vasopneumatic Device, press/temp:     min []  Starleen Nicole / Verlon Brightly: If using vaso (only need to measure limb vaso being performed on)      pre-treatment girth :       post-treatment girth :       measured at (landmark location) :      min []  Other:    Skin assessment post-treatment (if applicable):    []  intact    []  redness- no adverse reaction                 []redness - adverse reaction:         Therapeutic Procedures:   Tx Min Billable or 1:1 Min (if diff from Tx Min) Procedure, Rationale, Specifics   15 15 [id number] Therapeutic Exercise (timed):  increase ROM, strength,

## 2023-07-05 ENCOUNTER — HOSPITAL ENCOUNTER (OUTPATIENT)
Facility: HOSPITAL | Age: 83
Setting detail: RECURRING SERIES
Discharge: HOME | End: 2023-07-08
Payer: MEDICARE

## 2023-07-05 PROCEDURE — 97140 MANUAL THERAPY 1/> REGIONS: CPT

## 2023-07-05 PROCEDURE — 97110 THERAPEUTIC EXERCISES: CPT

## 2023-07-05 PROCEDURE — 97112 NEUROMUSCULAR REEDUCATION: CPT

## 2023-07-05 NOTE — PROGRESS NOTES
PHYSICAL / OCCUPATIONAL THERAPY - DAILY TREATMENT NOTE (updated )    Patient Name: Seth Mckeon    Date: 2023    : 1940  Insurance: Payor: MEDICARE / Plan: MEDICARE PART A AND B / Product Type: *No Product type* /      Patient  verified Yes   Visit #   Current / Total 24 30   Time   In / Out 11:05 11:55   Pain   In / Out Stiff 1 Stiff 1   Subjective Functional Status/Changes: Pt reports difficulty with stairs. Pt reports low pain but increased stiffness     TREATMENT AREA =  Pain in right knee [M25.561]    OBJECTIVE    Modalities Rationale:     decrease inflammation and decrease pain to improve patient's ability to progress to PLOF and address remaining functional goals. min [] Estim Unattended, type/location:                                      []  w/ice    []  w/heat    min [] Estim Attended, type/location:                                     []  w/US     []  w/ice    []  w/heat    []  TENS insruct      min []  Mechanical Traction: type/lbs                   []  pro   []  sup   []  int   []  cont    []  before manual    []  after manual    min []  Ultrasound, settings/location:      min []  Iontophoresis w/ dexamethasone, location:                                               []  take home patch       []  in clinic   10 min  unbill [x]  Ice     []  Heat    location/position: R kne    min []  Paraffin,  details:     min []  Vasopneumatic Device, press/temp:     min []  Carlos Jones / Trever Necessary: If using vaso (only need to measure limb vaso being performed on)      pre-treatment girth :       post-treatment girth :       measured at (landmark location) :      min []  Other:    Skin assessment post-treatment (if applicable):    []  intact    []  redness- no adverse reaction                 []redness - adverse reaction:         Therapeutic Procedures:   Tx Min Billable or 1:1 Min (if diff from Tx Min) Procedure, Rationale, Specifics   15 15 [id number] Therapeutic Exercise (timed):  increase ROM,

## 2023-07-07 ENCOUNTER — HOSPITAL ENCOUNTER (OUTPATIENT)
Facility: HOSPITAL | Age: 83
Setting detail: RECURRING SERIES
Discharge: HOME | End: 2023-07-10
Payer: MEDICARE

## 2023-07-07 PROCEDURE — 97112 NEUROMUSCULAR REEDUCATION: CPT

## 2023-07-07 PROCEDURE — 97110 THERAPEUTIC EXERCISES: CPT

## 2023-07-10 ENCOUNTER — HOSPITAL ENCOUNTER (OUTPATIENT)
Facility: HOSPITAL | Age: 83
Setting detail: RECURRING SERIES
Discharge: HOME | End: 2023-07-13
Payer: MEDICARE

## 2023-07-10 PROCEDURE — 97112 NEUROMUSCULAR REEDUCATION: CPT

## 2023-07-10 NOTE — PROGRESS NOTES
PHYSICAL / OCCUPATIONAL THERAPY - DAILY TREATMENT NOTE (updated )    Patient Name: Mel Fink    Date: 7/10/2023    : 1940  Insurance: Payor: MEDICARE / Plan: MEDICARE PART A AND B / Product Type: *No Product type* /      Patient  verified Yes   Visit #   Current / Total 26 30   Time   In / Out 11:05 11:55   Pain   In / Out Stiff, 0 0   Subjective Functional Status/Changes: Pt reports difficulty with moving the knee after sitting for prolonged periods     TREATMENT AREA =  Pain in right knee [M25.561]    OBJECTIVE    Modalities Rationale:     decrease inflammation and decrease pain to improve patient's ability to progress to PLOF and address remaining functional goals. min [] Estim Unattended, type/location:                                      []  w/ice    []  w/heat    min [] Estim Attended, type/location:                                     []  w/US     []  w/ice    []  w/heat    []  TENS insruct      min []  Mechanical Traction: type/lbs                   []  pro   []  sup   []  int   []  cont    []  before manual    []  after manual    min []  Ultrasound, settings/location:      min []  Iontophoresis w/ dexamethasone, location:                                               []  take home patch       []  in clinic    min  unbill []  Ice     []  Heat    location/position:     min []  Paraffin,  details:     min []  Vasopneumatic Device, press/temp:     min []  Seneca Riddles / Sheri Port: If using vaso (only need to measure limb vaso being performed on)      pre-treatment girth :       post-treatment girth :       measured at (landmark location) :      min []  Other:    Skin assessment post-treatment (if applicable):    []  intact    []  redness- no adverse reaction                 []redness - adverse reaction:         Therapeutic Procedures:   Tx Min Billable or 1:1 Min (if diff from Tx Min) Procedure, Rationale, Specifics   15  [id number] Therapeutic Exercise (timed):  increase ROM, strength,

## 2023-07-12 ENCOUNTER — HOSPITAL ENCOUNTER (OUTPATIENT)
Facility: HOSPITAL | Age: 83
Setting detail: RECURRING SERIES
Discharge: HOME | End: 2023-07-15
Payer: MEDICARE

## 2023-07-12 PROCEDURE — 97110 THERAPEUTIC EXERCISES: CPT

## 2023-07-12 NOTE — PROGRESS NOTES
progress therapeutic interventions, analyze and address functional mobility deficits, analyze and address ROM deficits, analyze and address strength deficits, analyze and address soft tissue restrictions, and analyze and cue for proper movement patterns to address functional deficits and attain remaining goals.     Progress toward goals / Updated goals:  []  See Progress Note/Recertification    Good Progress to    [] STG    [x] LTG  1 as shown by improved overall pain levels with ADLs    PLAN  Yes Continue plan of care  [x]  Upgrade activities as tolerated  []  Discharge due to :  []  Other:    Dominga Ro PT    7/12/2023    1:01 PM    Future Appointments   Date Time Provider [address]   7/14/2023  1:40 PM [facility]   7/17/2023 11:00 AM Dominga Ro, PT [facility]   7/19/2023 11:00 AM Dominga Ro PT [facility]   7/21/2023 11:00 AM Dominga Ro, PT [facility]

## 2023-07-13 ENCOUNTER — APPOINTMENT (OUTPATIENT)
Facility: HOSPITAL | Age: 83
End: 2023-07-13
Payer: MEDICARE

## 2023-07-14 ENCOUNTER — HOSPITAL ENCOUNTER (OUTPATIENT)
Facility: HOSPITAL | Age: 83
Setting detail: RECURRING SERIES
Discharge: HOME | End: 2023-07-17
Payer: MEDICARE

## 2023-07-14 ENCOUNTER — APPOINTMENT (OUTPATIENT)
Facility: HOSPITAL | Age: 83
End: 2023-07-14
Payer: MEDICARE

## 2023-07-14 PROCEDURE — 97140 MANUAL THERAPY 1/> REGIONS: CPT

## 2023-07-14 PROCEDURE — 97110 THERAPEUTIC EXERCISES: CPT

## 2023-07-17 ENCOUNTER — APPOINTMENT (OUTPATIENT)
Facility: HOSPITAL | Age: 83
End: 2023-07-17
Payer: MEDICARE

## 2023-07-19 ENCOUNTER — HOSPITAL ENCOUNTER (OUTPATIENT)
Facility: HOSPITAL | Age: 83
Setting detail: RECURRING SERIES
Discharge: HOME | End: 2023-07-22
Payer: MEDICARE

## 2023-07-19 PROCEDURE — 97140 MANUAL THERAPY 1/> REGIONS: CPT

## 2023-07-19 PROCEDURE — 97112 NEUROMUSCULAR REEDUCATION: CPT

## 2023-07-19 NOTE — PROGRESS NOTES
well    Patient will continue to benefit from skilled PT / OT services to modify and progress therapeutic interventions, analyze and address functional mobility deficits, analyze and address ROM deficits, analyze and address strength deficits, and analyze and address soft tissue restrictions to address functional deficits and attain remaining goals.     Progress toward goals / Updated goals:  []  See Progress Note/Recertification    Good Progress to    [] STG    [x] LTG  1 as shown by improved overall pain with ADLs    PLAN  Yes Continue plan of care  [x]  Upgrade activities as tolerated  []  Discharge due to :  []  Other:    Phil Workman PT    7/19/2023    2:15 PM    Future Appointments   Date Time Provider 4600  [facility]   7/21/2023 11:00 AM Phil Workman PT [facility]   7/25/2023  2:20 PM Phil Workman PT [facility]   7/28/2023  2:20 PM Phil Workman PT [facility]

## 2023-07-21 ENCOUNTER — HOSPITAL ENCOUNTER (OUTPATIENT)
Facility: HOSPITAL | Age: 83
Setting detail: RECURRING SERIES
Discharge: HOME | End: 2023-07-24
Payer: MEDICARE

## 2023-07-21 PROCEDURE — 97140 MANUAL THERAPY 1/> REGIONS: CPT

## 2023-07-21 PROCEDURE — 97110 THERAPEUTIC EXERCISES: CPT

## 2023-07-21 PROCEDURE — 97112 NEUROMUSCULAR REEDUCATION: CPT

## 2023-07-21 NOTE — PROGRESS NOTES
PHYSICAL / OCCUPATIONAL THERAPY - DAILY TREATMENT NOTE (updated )    Patient Name: Natasha Jauregui    Date: 2023    : 1940  Insurance: Payor: MEDICARE / Plan: MEDICARE PART A AND B / Product Type: *No Product type* /      Patient  verified Yes   Visit #   Current / Total 30 36   Time   In / Out 11:15 12:05   Pain   In / Out 1 1   Subjective Functional Status/Changes: Pt reports stiffness with ADLs. Pt reports difficulty with walking up and down stair     TREATMENT AREA =  Pain in right knee [M25.561]    OBJECTIVE    Modalities Rationale:     decrease inflammation and decrease pain to improve patient's ability to progress to PLOF and address remaining functional goals. min [] Estim Unattended, type/location:                                      []  w/ice    []  w/heat    min [] Estim Attended, type/location:                                     []  w/US     []  w/ice    []  w/heat    []  TENS insruct      min []  Mechanical Traction: type/lbs                   []  pro   []  sup   []  int   []  cont    []  before manual    []  after manual    min []  Ultrasound, settings/location:      min []  Iontophoresis w/ dexamethasone, location:                                               []  take home patch       []  in clinic   10 min  unbill [x]  Ice     []  Heat    location/position: R knee    min []  Paraffin,  details:     min []  Vasopneumatic Device, press/temp:     min []  Wichita Lute / Tony Kirks: If using vaso (only need to measure limb vaso being performed on)      pre-treatment girth :       post-treatment girth :       measured at (landmark location) :      min []  Other:    Skin assessment post-treatment (if applicable):    []  intact    []  redness- no adverse reaction                 []redness - adverse reaction:         Therapeutic Procedures:   Tx Min Billable or 1:1 Min (if diff from Tx Min) Procedure, Rationale, Specifics   15 15 61150 Therapeutic Exercise (timed):  increase ROM,

## 2023-07-24 ENCOUNTER — HOSPITAL ENCOUNTER (OUTPATIENT)
Facility: HOSPITAL | Age: 83
Setting detail: RECURRING SERIES
Discharge: HOME | End: 2023-07-27
Payer: MEDICARE

## 2023-07-24 PROCEDURE — 97112 NEUROMUSCULAR REEDUCATION: CPT

## 2023-07-24 PROCEDURE — 97110 THERAPEUTIC EXERCISES: CPT

## 2023-07-24 PROCEDURE — 97140 MANUAL THERAPY 1/> REGIONS: CPT

## 2023-07-24 NOTE — PROGRESS NOTES
PHYSICAL / OCCUPATIONAL THERAPY - DAILY TREATMENT NOTE (updated )    Patient Name: Natalie Doyle    Date: 2023    : 1940  Insurance: Payor: MEDICARE / Plan: MEDICARE PART A AND B / Product Type: *No Product type* /      Patient  verified Yes   Visit #   Current / Total 31 36   Time   In / Out 1:10 2:00   Pain   In / Out 3 3   Subjective Functional Status/Changes: Pt reports difficulty with stairs and walking. Pt reports stiffness still all the time worse after prolonged positioning     TREATMENT AREA =  Pain in right knee [M25.561]    OBJECTIVE    Modalities Rationale:     decrease inflammation and decrease pain to improve patient's ability to progress to PLOF and address remaining functional goals. min [] Estim Unattended, type/location:                                      []  w/ice    []  w/heat    min [] Estim Attended, type/location:                                     []  w/US     []  w/ice    []  w/heat    []  TENS insruct      min []  Mechanical Traction: type/lbs                   []  pro   []  sup   []  int   []  cont    []  before manual    []  after manual    min []  Ultrasound, settings/location:      min []  Iontophoresis w/ dexamethasone, location:                                               []  take home patch       []  in clinic    min  unbill []  Ice     []  Heat    location/position:     min []  Paraffin,  details:     min []  Vasopneumatic Device, press/temp:     min []  Loretta Belt / Huertas Lefort: If using vaso (only need to measure limb vaso being performed on)      pre-treatment girth :       post-treatment girth :       measured at (landmark location) :      min []  Other:    Skin assessment post-treatment (if applicable):    []  intact    []  redness- no adverse reaction                 []redness - adverse reaction:         Therapeutic Procedures:   Tx Min Billable or 1:1 Min (if diff from Tx Min) Procedure, Rationale, Specifics   15 86 [id number] Therapeutic Exercise

## 2023-07-25 ENCOUNTER — APPOINTMENT (OUTPATIENT)
Facility: HOSPITAL | Age: 83
End: 2023-07-25
Payer: MEDICARE

## 2023-07-26 ENCOUNTER — HOSPITAL ENCOUNTER (OUTPATIENT)
Facility: HOSPITAL | Age: 83
Setting detail: RECURRING SERIES
Discharge: HOME | End: 2023-07-29
Payer: MEDICARE

## 2023-07-26 PROCEDURE — 97110 THERAPEUTIC EXERCISES: CPT

## 2023-07-26 PROCEDURE — 97140 MANUAL THERAPY 1/> REGIONS: CPT

## 2023-07-26 NOTE — PROGRESS NOTES
PHYSICAL / OCCUPATIONAL THERAPY - DAILY TREATMENT NOTE (updated )    Patient Name: Yoli Downey    Date: 2023    : 1940  Insurance: Payor: MEDICARE / Plan: MEDICARE PART A AND B / Product Type: *No Product type* /      Patient  verified Yes   Visit #   Current / Total 32 40   Time   In / Out 320 3:55   Pain   In / Out 3 3       Subjective Functional Status/Changes: Pt reports significant stiffness with ADLs. Pt reports soreness when walking after sitting     TREATMENT AREA =  Pain in right knee [M25.561]    OBJECTIVE    Modalities Rationale:     decrease inflammation and decrease pain to improve patient's ability to progress to PLOF and address remaining functional goals. min [] Estim Unattended, type/location:                                      []  w/ice    []  w/heat    min [] Estim Attended, type/location:                                     []  w/US     []  w/ice    []  w/heat    []  TENS insruct      min []  Mechanical Traction: type/lbs                   []  pro   []  sup   []  int   []  cont    []  before manual    []  after manual    min []  Ultrasound, settings/location:      min []  Iontophoresis w/ dexamethasone, location:                                               []  take home patch       []  in clinic   10 min  unbill [x]  Ice     []  Heat    location/position: R knee    min []  Paraffin,  details:     min []  Vasopneumatic Device, press/temp:     min []  Stas Cardenas / Jair Swetha: If using vaso (only need to measure limb vaso being performed on)      pre-treatment girth :       post-treatment girth :       measured at (landmark location) :      min []  Other:    Skin assessment post-treatment (if applicable):    []  intact    []  redness- no adverse reaction                 []redness - adverse reaction:         Therapeutic Procedures:   Tx Min Billable or 1:1 Min (if diff from Tx Min) Procedure, Rationale, Specifics   15 15 [id number] Therapeutic Exercise (timed):  increase ROM,

## 2023-07-28 ENCOUNTER — APPOINTMENT (OUTPATIENT)
Facility: HOSPITAL | Age: 83
End: 2023-07-28
Payer: MEDICARE

## 2023-08-21 ENCOUNTER — APPOINTMENT (OUTPATIENT)
Facility: HOSPITAL | Age: 83
End: 2023-08-21
Payer: MEDICARE

## 2023-08-23 ENCOUNTER — APPOINTMENT (OUTPATIENT)
Facility: HOSPITAL | Age: 83
End: 2023-08-23
Payer: MEDICARE

## 2023-08-25 ENCOUNTER — APPOINTMENT (OUTPATIENT)
Facility: HOSPITAL | Age: 83
End: 2023-08-25
Payer: MEDICARE

## 2023-08-28 ENCOUNTER — APPOINTMENT (OUTPATIENT)
Facility: HOSPITAL | Age: 83
End: 2023-08-28
Payer: MEDICARE

## 2023-08-30 ENCOUNTER — APPOINTMENT (OUTPATIENT)
Facility: HOSPITAL | Age: 83
End: 2023-08-30
Payer: MEDICARE

## 2023-08-30 ENCOUNTER — HOSPITAL ENCOUNTER (OUTPATIENT)
Facility: HOSPITAL | Age: 83
Setting detail: RECURRING SERIES
Discharge: HOME | End: 2023-09-02
Payer: MEDICARE

## 2023-08-30 PROCEDURE — 97112 NEUROMUSCULAR REEDUCATION: CPT

## 2023-08-30 PROCEDURE — 97110 THERAPEUTIC EXERCISES: CPT

## 2023-08-30 PROCEDURE — 97140 MANUAL THERAPY 1/> REGIONS: CPT

## 2023-08-30 NOTE — PROGRESS NOTES
PHYSICAL / OCCUPATIONAL THERAPY - DAILY TREATMENT NOTE (updated )    Patient Name: Micheal Mahmood    Date: 2023    : 1940  Insurance: Payor: MEDICARE / Plan: MEDICARE PART A AND B / Product Type: *No Product type* /      Patient  verified Yes   Visit #   Current / Total 33 40   Time   In / Out 3:05 4:10   Pain   In / Out 2 2   Subjective Functional Status/Changes: See Recert     TREATMENT AREA =  Pain in right knee [M25.561]    OBJECTIVE    Modalities Rationale:     decrease inflammation and decrease pain to improve patient's ability to progress to PLOF and address remaining functional goals. min [] Estim Unattended, type/location:                                      []  w/ice    []  w/heat    min [] Estim Attended, type/location:                                     []  w/US     []  w/ice    []  w/heat    []  TENS insruct      min []  Mechanical Traction: type/lbs                   []  pro   []  sup   []  int   []  cont    []  before manual    []  after manual    min []  Ultrasound, settings/location:     10 min  unbill [x]  Ice     []  Heat    location/position: R knee    min []  Paraffin,  details:     min []  Vasopneumatic Device, press/temp:     min []  Kacie Osman / Fanny Lulú: If using vaso (only need to measure limb vaso being performed on)      pre-treatment girth :       post-treatment girth :       measured at (landmark location) :      min []  Other:    Skin assessment post-treatment:  Intact      Therapeutic Procedures: Tx Min Billable or 1:1 Min (if diff from Tx Min) Procedure, Rationale, Specifics   15 15 [id number] Therapeutic Exercise (timed):  increase ROM, strength, coordination, balance, and proprioception to improve patient's ability to progress to PLOF and address remaining functional goals.  (see flow sheet as applicable)     Details if applicable:       15 15 69604 Neuromuscular Re-Education (timed):  improve balance, coordination, kinesthetic sense, posture, core stability

## 2023-09-01 ENCOUNTER — HOSPITAL ENCOUNTER (OUTPATIENT)
Facility: HOSPITAL | Age: 83
Setting detail: RECURRING SERIES
Discharge: HOME | End: 2023-09-04
Payer: MEDICARE

## 2023-09-01 PROCEDURE — 97110 THERAPEUTIC EXERCISES: CPT

## 2023-09-01 PROCEDURE — 97112 NEUROMUSCULAR REEDUCATION: CPT

## 2023-09-01 PROCEDURE — 97140 MANUAL THERAPY 1/> REGIONS: CPT

## 2023-09-01 NOTE — PROGRESS NOTES
movement patterns to address functional deficits and attain remaining goals.     Progress toward goals / Updated goals:  []  See Progress Note/Recertification    Good Progress to    [] STG    [x] LTG  1 as shown by improved overall pain     PLAN  Yes Continue plan of care  [x]  Upgrade activities as tolerated  []  Discharge due to :  []  Other:    Tung Stagers, PT    9/1/2023    12:55 PM    Future Appointments   Date Time Provider 4600  46[facility]   9/6/2023 11:00 AM Tung Stagers, PT [facility]   9/8/2023 11:00 AM Tung Stagers, PT [facility]   9/11/2023 10:20 AM Tung Stagers, PT [facility]   9/13/2023 11:40 AM Tung Stagers, PT [facility]   9/15/2023 11:00 AM Tung Stagers, PT [facility]   9/18/2023 11:40 AM Tung Stagers, PT [facility]   9/20/2023 11:00 AM Tung Stagers, PT [facility]   9/22/2023 11:00 AM Tung Stagers, PT [facility]   9/25/2023 11:40 AM Tung Stagers, PT [facility]   9/27/2023 11:00 AM Tung Stagers, PT [facility]   9/29/2023 11:00 AM Tung Stagers, PT [facility]

## 2023-09-06 ENCOUNTER — HOSPITAL ENCOUNTER (OUTPATIENT)
Facility: HOSPITAL | Age: 83
Setting detail: RECURRING SERIES
Discharge: HOME | End: 2023-09-09
Payer: MEDICARE

## 2023-09-06 PROCEDURE — 97112 NEUROMUSCULAR REEDUCATION: CPT

## 2023-09-06 PROCEDURE — 97140 MANUAL THERAPY 1/> REGIONS: CPT

## 2023-09-06 PROCEDURE — 97110 THERAPEUTIC EXERCISES: CPT

## 2023-09-06 NOTE — PROGRESS NOTES
PHYSICAL / OCCUPATIONAL THERAPY - DAILY TREATMENT NOTE (updated )    Patient Name: Natalie Doyle    Date: 2023    : 1940  Insurance: Payor: MEDICARE / Plan: MEDICARE PART A AND B / Product Type: *No Product type* /      Patient  verified Yes   Visit #   Current / Total 35 42   Time   In / Out 1100 1215   Pain   In / Out 2 2   Subjective Functional Status/Changes: Pt reports difficulty with stairs and prolonged sitting. Pain levels still around 2/10     TREATMENT AREA =  Pain in right knee [M25.561]    OBJECTIVE    Modalities Rationale:     decrease inflammation, decrease pain, and increase tissue extensibility to improve patient's ability to progress to PLOF and address remaining functional goals. min [] Estim Unattended, type/location:                                      []  w/ice    []  w/heat    min [] Estim Attended, type/location:                                     []  w/US     []  w/ice    []  w/heat    []  TENS insruct      min []  Mechanical Traction: type/lbs                   []  pro   []  sup   []  int   []  cont    []  before manual    []  after manual    min []  Ultrasound, settings/location:     10 min  unbill [x]  Ice     []  Heat    location/position: R knee    min []  Paraffin,  details:     min []  Vasopneumatic Device, press/temp:     min []  Loretta Belt / Huertas Lefort: If using vaso (only need to measure limb vaso being performed on)      pre-treatment girth :       post-treatment girth :       measured at (landmark location) :      min []  Other:    Skin assessment post-treatment:   Intact      Therapeutic Procedures: Tx Min Billable or 1:1 Min (if diff from Tx Min) Procedure, Rationale, Specifics   15 15 [id number] Therapeutic Exercise (timed):  increase ROM, strength, coordination, balance, and proprioception to improve patient's ability to progress to PLOF and address remaining functional goals.  (see flow sheet as applicable)     Details if applicable:       15 10 10747

## 2023-09-08 ENCOUNTER — HOSPITAL ENCOUNTER (OUTPATIENT)
Facility: HOSPITAL | Age: 83
Setting detail: RECURRING SERIES
Discharge: HOME | End: 2023-09-11
Payer: MEDICARE

## 2023-09-08 PROCEDURE — 97112 NEUROMUSCULAR REEDUCATION: CPT

## 2023-09-08 PROCEDURE — 97110 THERAPEUTIC EXERCISES: CPT

## 2023-09-08 NOTE — PROGRESS NOTES
PHYSICAL / OCCUPATIONAL THERAPY - DAILY TREATMENT NOTE (updated )    Patient Name: Chico Aburto    Date: 2023    : 1940  Insurance: Payor: MEDICARE / Plan: MEDICARE PART A AND B / Product Type: *No Product type* /      Patient  verified Yes   Visit #   Current / Total 36 40   Time   In / Out 11:10 11:50   Pain   In / Out 2 2   Subjective Functional Status/Changes: Pt reports stiffness with walking and prolonged positioning. Pt having difficulty with stairs and bending     TREATMENT AREA =  Pain in right knee [M25.561]    OBJECTIVE    Modalities Rationale:     decrease inflammation and decrease pain to improve patient's ability to progress to PLOF and address remaining functional goals. min [] Estim Unattended, type/location:                                      []  w/ice    []  w/heat    min [] Estim Attended, type/location:                                     []  w/US     []  w/ice    []  w/heat    []  TENS insruct      min []  Mechanical Traction: type/lbs                   []  pro   []  sup   []  int   []  cont    []  before manual    []  after manual    min []  Ultrasound, settings/location:     10 min  unbill [x]  Ice     []  Heat    location/position: R knee    min []  Paraffin,  details:     min []  Vasopneumatic Device, press/temp:     min []  [place] / Julieta : If using vaso (only need to measure limb vaso being performed on)      pre-treatment girth :       post-treatment girth :       measured at (landmark location) :      min []  Other:    Skin assessment post-treatment:   Intact      Therapeutic Procedures: Tx Min Billable or 1:1 Min (if diff from Tx Min) Procedure, Rationale, Specifics   15 15 [id number] Therapeutic Exercise (timed):  increase ROM, strength, coordination, balance, and proprioception to improve patient's ability to progress to PLOF and address remaining functional goals.  (see flow sheet as applicable)     Details if applicable:       15 15 69378 Neuromuscular

## 2023-09-11 ENCOUNTER — HOSPITAL ENCOUNTER (OUTPATIENT)
Facility: HOSPITAL | Age: 83
Setting detail: RECURRING SERIES
Discharge: HOME | End: 2023-09-14
Payer: MEDICARE

## 2023-09-11 PROCEDURE — 97112 NEUROMUSCULAR REEDUCATION: CPT

## 2023-09-11 PROCEDURE — 97140 MANUAL THERAPY 1/> REGIONS: CPT

## 2023-09-11 NOTE — PROGRESS NOTES
PHYSICAL / OCCUPATIONAL THERAPY - DAILY TREATMENT NOTE (updated )    Patient Name: Yoli Downey    Date: 2023    : 1940  Insurance: Payor: MEDICARE / Plan: MEDICARE PART A AND B / Product Type: *No Product type* /      Patient  verified Yes   Visit #   Current / Total 37 40   Time   In / Out 10:30 11:20   Pain   In / Out 3 3   Subjective Functional Status/Changes: Pt reports difficulty with bending still. Pt reports difficulty with sitting for prolonged periods     TREATMENT AREA =  Pain in right knee [M25.561]    OBJECTIVE    Modalities Rationale:     decrease inflammation and decrease pain to improve patient's ability to progress to PLOF and address remaining functional goals. min [] Estim Unattended, type/location:                                      []  w/ice    []  w/heat    min [] Estim Attended, type/location:                                     []  w/US     []  w/ice    []  w/heat    []  TENS insruct      min []  Mechanical Traction: type/lbs                   []  pro   []  sup   []  int   []  cont    []  before manual    []  after manual    min []  Ultrasound, settings/location:      min  unbill []  Ice     []  Heat    location/position:     min []  Paraffin,  details:     min []  Vasopneumatic Device, press/temp:     min []  Stas Cardenas / Jair Swetha: If using vaso (only need to measure limb vaso being performed on)      pre-treatment girth :       post-treatment girth :       measured at (landmark location) :      min []  Other:    Skin assessment post-treatment:   Intact      Therapeutic Procedures: Tx Min Billable or 1:1 Min (if diff from Tx Min) Procedure, Rationale, Specifics   15 5 15886 Therapeutic Exercise (timed):  increase ROM, strength, coordination, balance, and proprioception to improve patient's ability to progress to PLOF and address remaining functional goals.  (see flow sheet as applicable)     Details if applicable:       15 15 99923 Neuromuscular Re-Education

## 2023-09-13 ENCOUNTER — HOSPITAL ENCOUNTER (OUTPATIENT)
Facility: HOSPITAL | Age: 83
Setting detail: RECURRING SERIES
Discharge: HOME | End: 2023-09-16
Payer: MEDICARE

## 2023-09-13 PROCEDURE — 97112 NEUROMUSCULAR REEDUCATION: CPT

## 2023-09-13 PROCEDURE — 97110 THERAPEUTIC EXERCISES: CPT

## 2023-09-13 NOTE — PROGRESS NOTES
PHYSICAL / OCCUPATIONAL THERAPY - DAILY TREATMENT NOTE (updated )    Patient Name: Debbie Deutsch    Date: 2023    : 1940  Insurance: Payor: MEDICARE / Plan: MEDICARE PART A AND B / Product Type: *No Product type* /      Patient  verified Yes   Visit #   Current / Total 38 40   Time   In / Out 11:40 12:05   Pain   In / Out 2 2   Subjective Functional Status/Changes: Pt reports having difficulty with bending and going up and down stairs     TREATMENT AREA =  Pain in right knee [M25.561]    OBJECTIVE         Therapeutic Procedures: Tx Min Billable or 1:1 Min (if diff from Tx Min) Procedure, Rationale, Specifics   15 15 08334 Therapeutic Exercise (timed):  increase ROM, strength, coordination, balance, and proprioception to improve patient's ability to progress to PLOF and address remaining functional goals. (see flow sheet as applicable)     Details if applicable:       10 10 11816 Neuromuscular Re-Education (timed):  improve balance, coordination, kinesthetic sense, posture, core stability and proprioception to improve patient's ability to develop conscious control of individual muscles and awareness of position of extremities in order to progress to PLOF and address remaining functional goals. (see flow sheet as applicable)     Details if applicable:       28017 Manual Therapy (timed):  decrease pain, increase ROM, and increase tissue extensibility to improve patient's ability to progress to PLOF and address remaining functional goals. The manual therapy interventions were performed at a separate and distinct time from the therapeutic activities interventions . (see flow sheet as applicable)     Details if applicable:       84295 Therapeutic Activity (timed):  use of dynamic activities replicating functional movements to increase ROM, strength, coordination, balance, and proprioception in order to improve patient's ability to progress to PLOF and address remaining functional goals.   (see flow

## 2023-09-15 ENCOUNTER — HOSPITAL ENCOUNTER (OUTPATIENT)
Facility: HOSPITAL | Age: 83
Setting detail: RECURRING SERIES
Discharge: HOME | End: 2023-09-18
Payer: MEDICARE

## 2023-09-15 PROCEDURE — 97140 MANUAL THERAPY 1/> REGIONS: CPT

## 2023-09-15 PROCEDURE — 97112 NEUROMUSCULAR REEDUCATION: CPT

## 2023-09-15 PROCEDURE — 97110 THERAPEUTIC EXERCISES: CPT

## 2023-09-15 NOTE — PROGRESS NOTES
deficits and attain remaining goals.     Progress toward goals / Updated goals:  []  See Progress Note/Recertification    Good Progress to    [] STG    [x] LTG  1 as shown by improved overall pain levels with ADLs    PLAN  Yes Continue plan of care  [x]  Upgrade activities as tolerated  []  Discharge due to :  []  Other:    Kolton Jaquez PT    9/15/2023    4:17 PM    Future Appointments   Date Time Provider 4600  46[facility]   9/18/2023 11:40 AM Kolton Jaquez, PT [facility]   9/20/2023 11:00 AM Kolton Jaquez PT [facility]   9/22/2023 11:00 AM Kolton Jaquez, PT [facility]   9/25/2023 11:40 AM Kolton Jaquez PT [facility]   9/27/2023 11:00 AM Kolton Jaquez PT [facility]   9/29/2023 11:00 AM Kolton Jaquez PT [facility]

## 2023-09-18 ENCOUNTER — APPOINTMENT (OUTPATIENT)
Facility: HOSPITAL | Age: 83
End: 2023-09-18
Payer: MEDICARE

## 2023-09-20 ENCOUNTER — HOSPITAL ENCOUNTER (OUTPATIENT)
Facility: HOSPITAL | Age: 83
Setting detail: RECURRING SERIES
Discharge: HOME | End: 2023-09-23
Payer: MEDICARE

## 2023-09-20 PROCEDURE — 97112 NEUROMUSCULAR REEDUCATION: CPT

## 2023-09-20 PROCEDURE — 97110 THERAPEUTIC EXERCISES: CPT

## 2023-09-20 NOTE — PROGRESS NOTES
PHYSICAL / OCCUPATIONAL THERAPY - DAILY TREATMENT NOTE (updated )    Patient Name: Joey Hernandez    Date: 2023    : 1940  Insurance: Payor: MEDICARE / Plan: MEDICARE PART A AND B / Product Type: *No Product type* /      Patient  verified Yes   Visit #   Current / Total 40 44   Time   In / Out 11:10 12:00   Pain   In / Out 0 0   Subjective Functional Status/Changes: Pt reports difficulty with reaching overhead with ADLs     TREATMENT AREA =  Pain in right knee [M25.561]    OBJECTIVE    Modalities Rationale:     decrease inflammation and decrease pain to improve patient's ability to progress to PLOF and address remaining functional goals. min [] Estim Unattended, type/location:                                      []  w/ice    []  w/heat    min [] Estim Attended, type/location:                                     []  w/US     []  w/ice    []  w/heat    []  TENS insruct      min []  Mechanical Traction: type/lbs                   []  pro   []  sup   []  int   []  cont    []  before manual    []  after manual    min []  Ultrasound, settings/location:     10 min  unbill [x]  Ice     []  Heat    location/position: R knee    min []  Paraffin,  details:     min []  Vasopneumatic Device, press/temp:     min []  Precious Nab / Pamula Blind: If using vaso (only need to measure limb vaso being performed on)      pre-treatment girth :       post-treatment girth :       measured at (landmark location) :      min []  Other:    Skin assessment post-treatment:   Intact      Therapeutic Procedures: Tx Min Billable or 1:1 Min (if diff from Tx Min) Procedure, Rationale, Specifics   15 15 [id number] Therapeutic Exercise (timed):  increase ROM, strength, coordination, balance, and proprioception to improve patient's ability to progress to PLOF and address remaining functional goals.  (see flow sheet as applicable)     Details if applicable:       15 15 30317 Neuromuscular Re-Education (timed):  improve balance,

## 2023-09-22 ENCOUNTER — HOSPITAL ENCOUNTER (OUTPATIENT)
Facility: HOSPITAL | Age: 83
Setting detail: RECURRING SERIES
Discharge: HOME | End: 2023-09-25
Payer: MEDICARE

## 2023-09-22 PROCEDURE — 97110 THERAPEUTIC EXERCISES: CPT

## 2023-09-22 PROCEDURE — 97112 NEUROMUSCULAR REEDUCATION: CPT

## 2023-09-22 NOTE — PROGRESS NOTES
PHYSICAL / OCCUPATIONAL THERAPY - DAILY TREATMENT NOTE (updated )    Patient Name: Johann Sheikh    Date: 2023    : 1940  Insurance: Payor: MEDICARE / Plan: MEDICARE PART A AND B / Product Type: *No Product type* /      Patient  verified Yes   Visit #   Current / Total 41 44   Time   In / Out 11:10 12:00   Pain   In / Out 0 0   Subjective Functional Status/Changes: Pt reports stiffness and difficulty with walking     TREATMENT AREA =  Pain in right knee [M25.561]    OBJECTIVE    Modalities Rationale:     decrease inflammation and decrease pain to improve patient's ability to progress to PLOF and address remaining functional goals. min [] Estim Unattended, type/location:                                      []  w/ice    []  w/heat    min [] Estim Attended, type/location:                                     []  w/US     []  w/ice    []  w/heat    []  TENS insruct      min []  Mechanical Traction: type/lbs                   []  pro   []  sup   []  int   []  cont    []  before manual    []  after manual    min []  Ultrasound, settings/location:     10 min  unbill [x]  Ice     []  Heat    location/position: R knee    min []  Paraffin,  details:     min []  Vasopneumatic Device, press/temp:     min []  Kimani Crain / Kayce Ropes: If using vaso (only need to measure limb vaso being performed on)      pre-treatment girth :       post-treatment girth :       measured at (landmark location) :      min []  Other:    Skin assessment post-treatment:   Intact      Therapeutic Procedures: Tx Min Billable or 1:1 Min (if diff from Tx Min) Procedure, Rationale, Specifics   15 15 33356 Therapeutic Exercise (timed):  increase ROM, strength, coordination, balance, and proprioception to improve patient's ability to progress to PLOF and address remaining functional goals.  (see flow sheet as applicable)     Details if applicable:       15 15 [id number] Neuromuscular Re-Education (timed):  improve balance, coordination,

## 2023-09-25 ENCOUNTER — HOSPITAL ENCOUNTER (OUTPATIENT)
Facility: HOSPITAL | Age: 83
Setting detail: RECURRING SERIES
Discharge: HOME | End: 2023-09-28
Payer: MEDICARE

## 2023-09-25 PROCEDURE — 97110 THERAPEUTIC EXERCISES: CPT

## 2023-09-25 PROCEDURE — 97112 NEUROMUSCULAR REEDUCATION: CPT

## 2023-09-25 NOTE — PROGRESS NOTES
PHYSICAL / OCCUPATIONAL THERAPY - DAILY TREATMENT NOTE (updated )    Patient Name: Carol Brantley    Date: 2023    : 1940  Insurance: Payor: MEDICARE / Plan: MEDICARE PART A AND B / Product Type: *No Product type* /      Patient  verified Yes   Visit #   Current / Total 41 43   Time   In / Out 11:25 12:00   Pain   In / Out 0 0   Subjective Functional Status/Changes: Pt reports difficulty with walking. Pt states she is still really stiff in the morning and prolonged positioning     TREATMENT AREA =  Pain in right knee [M25.561]    OBJECTIVE    Modalities Rationale:     decrease inflammation and decrease pain to improve patient's ability to progress to PLOF and address remaining functional goals. min [] Estim Unattended, type/location:                                      []  w/ice    []  w/heat    min [] Estim Attended, type/location:                                     []  w/US     []  w/ice    []  w/heat    []  TENS insruct      min []  Mechanical Traction: type/lbs                   []  pro   []  sup   []  int   []  cont    []  before manual    []  after manual    min []  Ultrasound, settings/location:     10 min  unbill [x]  Ice     []  Heat    location/position: R knee    min []  Paraffin,  details:     min []  Vasopneumatic Device, press/temp:     min []  Ioana Salon / Delos Overcast: If using vaso (only need to measure limb vaso being performed on)      pre-treatment girth :       post-treatment girth :       measured at (landmark location) :      min []  Other:    Skin assessment post-treatment:   Intact      Therapeutic Procedures: Tx Min Billable or 1:1 Min (if diff from Tx Min) Procedure, Rationale, Specifics   15 57 [id number] Therapeutic Exercise (timed):  increase ROM, strength, coordination, balance, and proprioception to improve patient's ability to progress to PLOF and address remaining functional goals.  (see flow sheet as applicable)     Details if applicable:       64 [phone] [id number]

## 2023-09-27 ENCOUNTER — APPOINTMENT (OUTPATIENT)
Facility: HOSPITAL | Age: 83
End: 2023-09-27
Payer: MEDICARE

## 2023-09-27 NOTE — PROGRESS NOTES
PHYSICAL THERAPY - DAILY TREATMENT NOTE    Patient Name: Rosalba Armstrong        Date: 6/3/2021  : 1940    Patient  Verified: YES  Visit #:     Insurance: Payor: Staci Dumontshweta / Plan: [place] & B / Product Type: Medicare /      In time: 2:20 Out time: 3:15   Total Treatment Time: 55     Medicare Time Tracking (below)   Total Timed Codes (min):  45 1:1 Treatment Time:  25     TREATMENT AREA/ DIAGNOSIS = Low back pain [M54.5]    SUBJECTIVE  Pain Level (on 0 to 10 scale):  0  / 10   Medication Changes/New allergies or changes in medical history, any new surgeries or procedures?     NO    If yes, update Summary List   Subjective Functional Status/Changes:  []  No changes reported     Pt reports no pain today but did have pain with standing yesterday      OBJECTIVE  Modalities Rationale:     decrease inflammation and decrease pain to improve patient's ability to perform ADLs without pain     min [] Estim, type/location:                                      []  att     []  unatt     []  w/US     []  w/ice    []  w/heat    min []  Mechanical Traction: type/lbs                   []  pro   []  sup   []  int   []  cont    []  before manual    []  after manual    min []  Ultrasound, settings/location:      min []  Iontophoresis w/ dexamethasone, location:                                               []  take home patch       []  in clinic   10 min [x]  Ice     []  Heat    location/position: L/S    min []  Vasopneumatic Device, press/temp:     min []  Other:    [] Skin assessment post-treatment (if applicable):    []  intact    []  redness- no adverse reaction     []redness  adverse reaction:        45 min Therapeutic Exercise:  [x]  See flow sheet   Rationale:      increase ROM and increase strength to improve the patients ability to perform ADLs without pain     Billed With/As:   [x] TE   [] TA   [] Neuro   [] Self Care Patient Education: [x] Review HEP    [] Progressed/Changed HEP based on: [] positioning   [] body mechanics   [] transfers   [] heat/ice application    [] other:        Other Objective/Functional Measures:    Pelvic alignment good today   Post Treatment Pain Level (on 0 to 10) scale:   0  / 10     ASSESSMENT  Assessment/Changes in Function:     Improved activity tolerance. Required less cueing for proper mechanics with exercises      []  See Progress Note/Recertification   Patient will continue to benefit from skilled PT services to modify and progress therapeutic interventions, address functional mobility deficits, address ROM deficits, address strength deficits and analyze and address soft tissue restrictions to attain remaining goals.    Progress toward goals / Updated goals:    Good Progress to    [] STG    [x] LTG  1 as shown by improved overall activity tolerance     PLAN  [x]  Upgrade activities as tolerated YES Continue plan of care   []  Discharge due to :    []  Other:      Therapist: Hai Dietz DPT     Date: 6/3/2021 Time: 8:43 AM        Future Appointments   Date Time Provider Jim Pendleton   6/3/2021  2:15 PM Gleen [place] [facility]   6/7/2021 11:30 AM Gleen Gu[place]ara [facility]   6/9/2021 11:30 AM Gleen Gu[place]ara [facility]   6/14/2021 11:30 AM Gleen Gu[place]ara [facility]   6/16/2021 10:00 AM Gleen Guadalajara [facility]   6/22/2021 10:00 AM Gleen Guadal[place]ara [facility]   6/30/2021 11:30 AM Gleen Guadalajara [facility] Crescentic Advancement Flap Text: The defect edges were debeveled with a #15 scalpel blade.  Given the location of the defect and the proximity to free margins a crescentic advancement flap was deemed most appropriate.  Using a sterile surgical marker, the appropriate advancement flap was drawn incorporating the defect and placing the expected incisions within the relaxed skin tension lines where possible.    The area thus outlined was incised deep to adipose tissue with a #15 scalpel blade.  The skin margins were undermined to an appropriate distance in all directions utilizing iris scissors.

## 2023-09-29 ENCOUNTER — HOSPITAL ENCOUNTER (OUTPATIENT)
Facility: HOSPITAL | Age: 83
Setting detail: RECURRING SERIES
End: 2023-09-29
Payer: MEDICARE

## 2023-09-29 PROCEDURE — 97530 THERAPEUTIC ACTIVITIES: CPT

## 2023-09-29 PROCEDURE — 97110 THERAPEUTIC EXERCISES: CPT

## 2023-09-29 PROCEDURE — 97112 NEUROMUSCULAR REEDUCATION: CPT

## 2023-09-29 NOTE — PROGRESS NOTES
PHYSICAL / OCCUPATIONAL THERAPY - DAILY TREATMENT NOTE (updated )    Patient Name: Mel Fink    Date: 2023    : 1940  Insurance: Payor: MEDICARE / Plan: MEDICARE PART A AND B / Product Type: *No Product type* /      Patient  verified Yes   Visit #   Current / Total 42 43   Time   In / Out 11:10 12:00   Pain   In / Out 0 0   Subjective Functional Status/Changes: Pt reports difficulty with balance and walking     TREATMENT AREA =  Pain in right knee [M25.561]    OBJECTIVE    Modalities Rationale:     decrease inflammation and decrease pain to improve patient's ability to progress to PLOF and address remaining functional goals. min [] Estim Unattended, type/location:                                      []  w/ice    []  w/heat    min [] Estim Attended, type/location:                                     []  w/US     []  w/ice    []  w/heat    []  TENS insruct      min []  Mechanical Traction: type/lbs                   []  pro   []  sup   []  int   []  cont    []  before manual    []  after manual    min []  Ultrasound, settings/location:      min  unbill []  Ice     []  Heat    location/position:     min []  Paraffin,  details:     min []  Vasopneumatic Device, press/temp:     min []  Osage Riddles / Sheri Port: If using vaso (only need to measure limb vaso being performed on)      pre-treatment girth :       post-treatment girth :       measured at (landmark location) :      min []  Other:    Skin assessment post-treatment:   Intact      Therapeutic Procedures: Tx Min Billable or 1:1 Min (if diff from Tx Min)1 Procedure, Rationale, Specifics   15 15 [id number] Therapeutic Exercise (timed):  increase ROM, strength, coordination, balance, and proprioception to improve patient's ability to progress to PLOF and address remaining functional goals.  (see flow sheet as applicable)     Details if applicable:       15 15 00998 Neuromuscular Re-Education (timed):  improve balance, coordination, kinesthetic

## 2023-10-02 ENCOUNTER — HOSPITAL ENCOUNTER (OUTPATIENT)
Facility: HOSPITAL | Age: 83
Setting detail: RECURRING SERIES
Discharge: HOME | End: 2023-10-05
Payer: MEDICARE

## 2023-10-02 PROCEDURE — 97530 THERAPEUTIC ACTIVITIES: CPT

## 2023-10-02 PROCEDURE — 97112 NEUROMUSCULAR REEDUCATION: CPT

## 2023-10-02 PROCEDURE — 97110 THERAPEUTIC EXERCISES: CPT

## 2023-10-02 NOTE — PROGRESS NOTES
PHYSICAL / OCCUPATIONAL THERAPY - DAILY TREATMENT NOTE (updated )    Patient Name: Nikki Aguayo    Date: 10/2/2023    : 1940  Insurance: Payor: MEDICARE / Plan: MEDICARE PART A AND B / Product Type: *No Product type* /      Patient  verified Yes   Visit #   Current / Total 43 43   Time   In / Out 11:45 12:35   Pain   In / Out 0 0   Subjective Functional Status/Changes: Pt reports stiffness with walking     TREATMENT AREA =  Pain in right knee [M25.561]    OBJECTIVE    Modalities Rationale:     decrease inflammation and decrease pain to improve patient's ability to progress to PLOF and address remaining functional goals. min [] Estim Unattended, type/location:                                      []  w/ice    []  w/heat    min [] Estim Attended, type/location:                                     []  w/US     []  w/ice    []  w/heat    []  TENS insruct      min []  Mechanical Traction: type/lbs                   []  pro   []  sup   []  int   []  cont    []  before manual    []  after manual    min []  Ultrasound, settings/location:     10 min  unbill [x]  Ice     []  Heat    location/position: R knee    min []  Paraffin,  details:     min []  Vasopneumatic Device, press/temp:     min []  Anitra Jace / Micaela Farias: If using vaso (only need to measure limb vaso being performed on)      pre-treatment girth :       post-treatment girth :       measured at (landmark location) :      min []  Other:    1      Therapeutic Procedures: Tx Min Billable or 1:1 Min (if diff from Tx Min) Procedure, Rationale, Specifics   15 15 25254 Therapeutic Exercise (timed):  increase ROM, strength, coordination, balance, and proprioception to improve patient's ability to progress to PLOF and address remaining functional goals.  (see flow sheet as applicable)     Details if applicable:       15 15 24901 Neuromuscular Re-Education (timed):  improve balance, coordination, kinesthetic sense, posture, core stability and

## 2024-07-01 ENCOUNTER — HOSPITAL ENCOUNTER (OUTPATIENT)
Facility: HOSPITAL | Age: 84
Setting detail: RECURRING SERIES
Discharge: HOME | End: 2024-07-04
Payer: MEDICARE

## 2024-07-01 PROCEDURE — 97161 PT EVAL LOW COMPLEX 20 MIN: CPT

## 2024-07-01 PROCEDURE — 97110 THERAPEUTIC EXERCISES: CPT

## 2024-07-01 NOTE — THERAPY EVALUATION
flexion 40-60 50%    Extension 20-30 50    SB right 20-30 50    SB left 20-30 50    Rotation right 5-10 50    Rotation left 5-10 50        Dural Mobility:  SLR Sitting: [x] R    [] L    [x] +    [] -  @ (degrees): 30deg   Slump Test: [x] R    [] L    [] +    [] -  @ (degrees): 45deg             Deficits:     Hamstrings 90/90: -45 deg on R and -40deg on Left        Oswestry Score: 50    Other tests/ comments: AROM knee flexion: 110deg, ext: -4    Pt will benefit from PT interventions to address the aforementioned deficits and allow pt to return to PLOF.    Not post operative  ===========================================================================================  Evaluation Complexity:  History:  LOW Complexity : Zero comorbidities / personal factors that will impact the outcome / POC; Examination:  LOW Complexity : 1-2 Standardized tests and measures addressing body structure, function, activity limitation and / or participation in recreation  ;Presentation:  LOW Complexity : Stable, uncomplicated  ; Clinical Decision Making: Oswestry Disability Index (KIRAN) for Low Back Pain = 50 % ; (> 41% Severe Disability) = HIGH Complexity Overall Complexity Rating: LOW   Problem List: pain affecting function, decrease ROM, decrease strength, impaired gait/balance, decrease ADL/functional abilities, and decrease activity tolerance   Treatment Plan may include any combination of the followin Therapeutic Exercise, 40107 Neuromuscular Re-Education, 32346 Manual Therapy, 46675 Therapeutic Activity, 40042 Self Care/Home Management, and 78746 Electrical Stim unattended  Patient / Family readiness to learn indicated by: asking questions, trying to perform skills, interest, return verbalization , and return demonstration   Persons(s) to be included in education: patient (P)  Barriers to Learning/Limitations: none  Measures taken if barriers to learning present: -  Patient Goal (s): to feel and move better  Patient Self

## 2024-07-01 NOTE — PROGRESS NOTES
"The patient has been notified of following:      \"This telephone visit will be conducted via a call between you and your physician/provider. We have found that certain health care needs can be provided without the need for a physical exam.  This service lets us provide the care you need with a short phone conversation.  If a prescription is necessary we can send it directly to your pharmacy.  If lab work is needed we can place an order for that and you can then stop by our lab to have the test done at a later time.     We will bill your insurance company for this service.  Please check with your medical insurance if this type of visit is covered. You may be responsible for the cost of this type of visit if insurance coverage is denied.  The typical cost is $30 (10min), $59 (11-20min) and $85 (21-30min).  Most often these visits are shorter than 10 minutes.     If during the course of the call the physician/provider feels a telephone visit is not appropriate, you will not be charged for this service.\"      Past medical history, social history, family history, allergy and medications were reviewed and updated as appropriate.    Phone call visit/virtual visit encounter:    Name of patient: Philly Triana    Date of encounter: 3/19/2020    Time of start of visit: 10:25    Time visit ended: 10:39    Last visit 2/2020 for follow up of hyperPTH and hypothyroidism.    1. Hyperparathyroidism s/p parathyroidectomy:  Recent labs from August 2019 showing normal calcium, magnesium, phosphorus, parathyroid hormone and vitamin D levels  As noted above history of parathyroidectomy with removal of 2 parathyroid gland in 2016.  Parathyroid was elevated even after that.    + multiple kidney stone  Follow up NM parathyroid scan (2018) and Neck US (2019) did not identify parathyroid adenoma.  Previous surgical path concerning for possibility of multiple adenomas/hyperplasia.  Repeat 24-hour urine calcium WNL.  She is not taking calcium " supplement.  She is taking vitamin D supplement-over-the-counter.  Labs 2/2020 showed normal calcium, vitamin D levels.  Kidney function slightly high but appears at baseline  Parathyroid hormone slightly elevated 103.  Clinically looks asymptomatic.    Plan:  In general no clinical s/s. No episode of kidney stones since last visit.  No FH of MEN syndrome, MTC.  Can consider screening for MEN syndrome given h/o >1 adenoma on surgical path. Though CT abdo done 12/2017 did not identify any pancreatic pathology.  In the setting of normal urine calcium and stable labs as well as asymptomatic nature plan to continue monitor  Repeat labs in 6 months  We will also check thyroid labs at that time.  Recommend vitamin D supplement  Recommend adequate hydration.      Total time spent is > 10 min including review of chart, labs, images and discussion with patient.  Discussed indications, risks and benefits of all medications prescribed, and answered questions to patient's satisfaction.  The patient indicates understanding of these issues and agrees with the plan.  All questions were answered.    Ramila Tiwari MD  Endocrinology   [facility]  March 19, 2020    Disclaimer: This note consists of symbols derived from keyboarding, dictation and/or voice recognition software. As a result, there may be errors in the script that have gone undetected. Please consider this when interpreting information found in this chart.             Continue plan of care  [x]  Upgrade activities as tolerated  []  Discharge due to :  []  Other:    Martell Saleh PT    7/1/2024    4:47 PM    Future Appointments   Date Time Provider Department Center   7/8/2024 10:20 AM Martell Saleh, PT [facility]   7/12/2024 10:20 AM Martell Saleh, PT [facility]   7/15/2024 10:20 AM Martell Saleh, PT [facility]   7/17/2024 11:00 AM Martell Saleh, PT [facility]   7/19/2024 11:00 AM Martell Saleh, PT [facility]   7/22/2024 11:40 AM Martell Saleh, PT [facility]   7/24/2024 11:00 AM Martell Saleh, PT [facility]   7/26/2024 11:00 AM Martell Saleh, PT [facility]   7/29/2024 11:00 AM Martell Saleh, PT [facility]   7/31/2024 11:00 AM Martell Saleh, PT [facility]   8/2/2024 11:00 AM Martell Saleh, PT [facility]

## 2024-07-08 ENCOUNTER — HOSPITAL ENCOUNTER (OUTPATIENT)
Facility: HOSPITAL | Age: 84
Setting detail: RECURRING SERIES
Discharge: HOME | End: 2024-07-11
Payer: MEDICARE

## 2024-07-08 PROCEDURE — 97112 NEUROMUSCULAR REEDUCATION: CPT

## 2024-07-08 PROCEDURE — 97535 SELF CARE MNGMENT TRAINING: CPT

## 2024-07-08 PROCEDURE — 97110 THERAPEUTIC EXERCISES: CPT

## 2024-07-08 NOTE — PROGRESS NOTES
PHYSICAL / OCCUPATIONAL THERAPY - DAILY TREATMENT NOTE (updated )    Patient Name: Elizabeth Galicia    Date: 2024    : 1940  Insurance: Payor: MEDICARE / Plan: MEDICARE PART A AND B / Product Type: *No Product type* /      Patient  verified Yes   Visit #   Current / Total 2 12   Time   In / Out 10:15 11:00   Pain   In / Out 0-3 0-3   Subjective Functional Status/Changes: Pt reports pain in the knee and sciatic pain.     TREATMENT AREA =  Other low back pain [M54.59]    OBJECTIVE         Therapeutic Procedures:  Tx Min Billable or 1:1 Min (if diff from Tx Min) Procedure, Rationale, Specifics   15  33064 Therapeutic Exercise (timed):  increase ROM, strength, coordination, balance, and proprioception to improve patient's ability to progress to PLOF and address remaining functional goals. (see flow sheet as applicable)     Details if applicable:       15  54889 Neuromuscular Re-Education (timed):  improve balance, coordination, kinesthetic sense, posture, core stability and proprioception to improve patient's ability to develop conscious control of individual muscles and awareness of position of extremities in order to progress to PLOF and address remaining functional goals. (see flow sheet as applicable)     Details if applicable:       94301 Manual Therapy (timed):  decrease pain, increase ROM, and increase tissue extensibility to improve patient's ability to progress to PLOF and address remaining functional goals.  The manual therapy interventions were performed at a separate and distinct time from the therapeutic activities interventions . (see flow sheet as applicable)     Details if applicable:       65348 Therapeutic Activity (timed):  use of dynamic activities replicating functional movements to increase ROM, strength, coordination, balance, and proprioception in order to improve patient's ability to progress to PLOF and address remaining functional goals.  (see flow sheet as applicable)

## 2024-07-10 ENCOUNTER — APPOINTMENT (OUTPATIENT)
Facility: HOSPITAL | Age: 84
End: 2024-07-10
Payer: MEDICARE

## 2024-07-12 ENCOUNTER — HOSPITAL ENCOUNTER (OUTPATIENT)
Facility: HOSPITAL | Age: 84
Setting detail: RECURRING SERIES
Discharge: HOME | End: 2024-07-15
Payer: MEDICARE

## 2024-07-12 PROCEDURE — 97112 NEUROMUSCULAR REEDUCATION: CPT

## 2024-07-12 PROCEDURE — 97110 THERAPEUTIC EXERCISES: CPT

## 2024-07-12 NOTE — PROGRESS NOTES
PHYSICAL / OCCUPATIONAL THERAPY - DAILY TREATMENT NOTE (updated )    Patient Name: Elizabeth Galicia    Date: 2024    : 1940  Insurance: Payor: MEDICARE / Plan: MEDICARE PART A AND B / Product Type: *No Product type* /      Patient  verified Yes   Visit #   Current / Total 3 12   Time   In / Out 10:20 11:10   Pain   In / Out 4 4   Subjective Functional Status/Changes: Pt report swelling and pain in the R knee. Pt reports difficulty with walking     TREATMENT AREA =  Other low back pain [M54.59]    OBJECTIVE    Modalities Rationale:     decrease inflammation and decrease pain to improve patient's ability to progress to PLOF and address remaining functional goals.     min [] Estim Unattended, type/location:                                      []  w/ice    []  w/heat    min [] Estim Attended, type/location:                                     []  w/US     []  w/ice    []  w/heat    []  TENS insruct      min []  Mechanical Traction: type/lbs                   []  pro   []  sup   []  int   []  cont    []  before manual    []  after manual    min []  Ultrasound, settings/location:     10 min  unbill [x]  Ice     []  Heat    location/position: R knee and L/S    min []  Paraffin,  details:     min []  Vasopneumatic Device, press/temp:     min []  [employer] / Fluido:    If using vaso (only need to measure limb vaso being performed on)      pre-treatment girth :       post-treatment girth :       measured at (landmark location) :      min []  Other:    Skin assessment post-treatment:   Intact      Therapeutic Procedures:  Tx Min Billable or 1:1 Min (if diff from Tx Min) Procedure, Rationale, Specifics   30 30 55132 Therapeutic Exercise (timed):  increase ROM, strength, coordination, balance, and proprioception to improve patient's ability to progress to PLOF and address remaining functional goals. (see flow sheet as applicable)     Details if applicable:       10 10 42044 Neuromuscular Re-Education (timed):

## 2024-07-15 ENCOUNTER — HOSPITAL ENCOUNTER (OUTPATIENT)
Facility: HOSPITAL | Age: 84
Setting detail: RECURRING SERIES
Discharge: HOME | End: 2024-07-18
Payer: MEDICARE

## 2024-07-15 PROCEDURE — 97110 THERAPEUTIC EXERCISES: CPT

## 2024-07-15 PROCEDURE — 97112 NEUROMUSCULAR REEDUCATION: CPT

## 2024-07-15 PROCEDURE — 97140 MANUAL THERAPY 1/> REGIONS: CPT

## 2024-07-15 NOTE — PROGRESS NOTES
PHYSICAL / OCCUPATIONAL THERAPY - DAILY TREATMENT NOTE (updated )    Patient Name: Elizabeth Galicia    Date: 7/15/2024    : 1940  Insurance: Payor: MEDICARE / Plan: MEDICARE PART A AND B / Product Type: *No Product type* /      Patient  verified Yes   Visit #   Current / Total 4 12   Time   In / Out 10:20 11:10   Pain   In / Out 0-3 0-3   Subjective Functional Status/Changes: Pt reports difficulty with walking and increased swelling in the R knee     TREATMENT AREA =  Other low back pain [M54.59]    OBJECTIVE    Modalities Rationale:     decrease inflammation and decrease pain to improve patient's ability to progress to PLOF and address remaining functional goals.     min [] Estim Unattended, type/location:                                      []  w/ice    []  w/heat    min [] Estim Attended, type/location:                                     []  w/US     []  w/ice    []  w/heat    []  TENS insruct      min []  Mechanical Traction: type/lbs                   []  pro   []  sup   []  int   []  cont    []  before manual    []  after manual    min []  Ultrasound, settings/location:      min  unbill []  Ice     []  Heat    location/position:     min []  Paraffin,  details:     min []  Vasopneumatic Device, press/temp:     min []  Whirlpool / Fluido:    If using vaso (only need to measure limb vaso being performed on)      pre-treatment girth :       post-treatment girth :       measured at (landmark location) :      min []  Other:    Skin assessment post-treatment:   Intact      Therapeutic Procedures:  Tx Min Billable or 1:1 Min (if diff from Tx Min) Procedure, Rationale, Specifics   15  77817 Therapeutic Exercise (timed):  increase ROM, strength, coordination, balance, and proprioception to improve patient's ability to progress to PLOF and address remaining functional goals. (see flow sheet as applicable)     Details if applicable:       15  49213 Neuromuscular Re-Education (timed):  improve balance,

## 2024-07-17 ENCOUNTER — HOSPITAL ENCOUNTER (OUTPATIENT)
Facility: HOSPITAL | Age: 84
Setting detail: RECURRING SERIES
Discharge: HOME | End: 2024-07-20
Payer: MEDICARE

## 2024-07-17 PROCEDURE — 97140 MANUAL THERAPY 1/> REGIONS: CPT

## 2024-07-17 PROCEDURE — 97112 NEUROMUSCULAR REEDUCATION: CPT

## 2024-07-17 NOTE — PROGRESS NOTES
PHYSICAL / OCCUPATIONAL THERAPY - DAILY TREATMENT NOTE (updated )    Patient Name: Elizabeth Galicia    Date: 2024    : 1940  Insurance: Payor: MEDICARE / Plan: MEDICARE PART A AND B / Product Type: *No Product type* /      Patient  verified Yes   Visit #   Current / Total 5 12   Time   In / Out 11;10 1200   Pain   In / Out 0 0   Subjective Functional Status/Changes: Pt reports stiffness in the R knee and pain in the lumbar spine     TREATMENT AREA =  Other low back pain [M54.59]    OBJECTIVE    Modalities Rationale:     decrease inflammation and decrease pain to improve patient's ability to progress to PLOF and address remaining functional goals.     min [] Estim Unattended, type/location:                                      []  w/ice    []  w/heat    min [] Estim Attended, type/location:                                     []  w/US     []  w/ice    []  w/heat    []  TENS insruct      min []  Mechanical Traction: type/lbs                   []  pro   []  sup   []  int   []  cont    []  before manual    []  after manual    min []  Ultrasound, settings/location:      min  unbill []  Ice     []  Heat    location/position:     min []  Paraffin,  details:     min []  Vasopneumatic Device, press/temp:     min []  Whirlpool / Fluido:    If using vaso (only need to measure limb vaso being performed on)      pre-treatment girth :       post-treatment girth :       measured at (landmark location) :      min []  Other:    Skin assessment post-treatment:   Intact      Therapeutic Procedures:  Tx Min Billable or 1:1 Min (if diff from Tx Min) Procedure, Rationale, Specifics   15 5 [id number] Therapeutic Exercise (timed):  increase ROM, strength, coordination, balance, and proprioception to improve patient's ability to progress to PLOF and address remaining functional goals. (see flow sheet as applicable)     Details if applicable:       15 15 66206 Neuromuscular Re-Education (timed):  improve balance, coordination,

## 2024-07-19 ENCOUNTER — HOSPITAL ENCOUNTER (OUTPATIENT)
Facility: HOSPITAL | Age: 84
Setting detail: RECURRING SERIES
Discharge: HOME | End: 2024-07-22
Payer: MEDICARE

## 2024-07-19 PROCEDURE — 97110 THERAPEUTIC EXERCISES: CPT

## 2024-07-19 PROCEDURE — 97112 NEUROMUSCULAR REEDUCATION: CPT

## 2024-07-19 PROCEDURE — 97140 MANUAL THERAPY 1/> REGIONS: CPT

## 2024-07-19 NOTE — PROGRESS NOTES
PHYSICAL / OCCUPATIONAL THERAPY - DAILY TREATMENT NOTE (updated )    Patient Name: Elizabeth Galicia    Date: 2024    : 1940  Insurance: Payor: MEDICARE / Plan: MEDICARE PART A AND B / Product Type: *No Product type* /      Patient  verified Yes   Visit #   Current / Total 6 12   Time   In / Out 11:00 11:50   Pain   In / Out 0 0   Subjective Functional Status/Changes: Pt reports that she is still really stiff in the R knee. Difficulty walking     TREATMENT AREA =  Other low back pain [M54.59]    OBJECTIVE    Modalities Rationale:     decrease inflammation and decrease pain to improve patient's ability to progress to PLOF and address remaining functional goals.     min [] Estim Unattended, type/location:                                      []  w/ice    []  w/heat    min [] Estim Attended, type/location:                                     []  w/US     []  w/ice    []  w/heat    []  TENS insruct      min []  Mechanical Traction: type/lbs                   []  pro   []  sup   []  int   []  cont    []  before manual    []  after manual    min []  Ultrasound, settings/location:     10 min  unbill [x]  Ice     []  Heat    location/position: L/S and R knee    min []  Paraffin,  details:     min []  Vasopneumatic Device, press/temp:     min []  Whirlpool / Fluido:    If using vaso (only need to measure limb vaso being performed on)      pre-treatment girth :       post-treatment girth :       measured at (landmark location) :      min []  Other:    Skin assessment post-treatment:   Intact      Therapeutic Procedures:  Tx Min Billable or 1:1 Min (if diff from Tx Min) Procedure, Rationale, Specifics   15 15 [id number] Therapeutic Exercise (timed):  increase ROM, strength, coordination, balance, and proprioception to improve patient's ability to progress to PLOF and address remaining functional goals. (see flow sheet as applicable)     Details if applicable:       15 15 50946 Neuromuscular Re-Education (timed):

## 2024-07-22 ENCOUNTER — HOSPITAL ENCOUNTER (OUTPATIENT)
Facility: HOSPITAL | Age: 84
Setting detail: RECURRING SERIES
Discharge: HOME | End: 2024-07-25
Payer: MEDICARE

## 2024-07-22 PROCEDURE — 97112 NEUROMUSCULAR REEDUCATION: CPT

## 2024-07-22 PROCEDURE — 97140 MANUAL THERAPY 1/> REGIONS: CPT

## 2024-07-22 PROCEDURE — 97110 THERAPEUTIC EXERCISES: CPT

## 2024-07-22 NOTE — PROGRESS NOTES
improve balance, coordination, kinesthetic sense, posture, core stability and proprioception to improve patient's ability to develop conscious control of individual muscles and awareness of position of extremities in order to progress to PLOF and address remaining functional goals. (see flow sheet as applicable)     Details if applicable:     10 10 71762 Manual Therapy (timed):  decrease pain, increase ROM, and increase tissue extensibility to improve patient's ability to progress to PLOF and address remaining functional goals.  The manual therapy interventions were performed at a separate and distinct time from the therapeutic activities interventions . (see flow sheet as applicable)     Details if applicable:       57732 Therapeutic Activity (timed):  use of dynamic activities replicating functional movements to increase ROM, strength, coordination, balance, and proprioception in order to improve patient's ability to progress to PLOF and address remaining functional goals.  (see flow sheet as applicable)     Details if applicable:            Details if applicable:     40 40 MC BC Totals Reminder: bill using total billable min of TIMED therapeutic procedures (example: do not include dry needle or estim unattended, both untimed codes, in totals to left)  8-22 min = 1 unit; 23-37 min = 2 units; 38-52 min = 3 units; 53-67 min = 4 units; 68-82 min = 5 units   Total Total     [x]  Patient Education billed concurrently with other procedures   [x] Review HEP    [] Progressed/Changed HEP, detail:    [] Other detail:       Objective Information/Functional Measures/Assessment    Increased swelling in the R knee    Patient will continue to benefit from skilled PT / OT services to modify and progress therapeutic interventions, analyze and address functional mobility deficits, analyze and address ROM deficits, analyze and address strength deficits, and analyze and address soft tissue restrictions to address functional deficits

## 2024-07-24 ENCOUNTER — HOSPITAL ENCOUNTER (OUTPATIENT)
Facility: HOSPITAL | Age: 84
Setting detail: RECURRING SERIES
Discharge: HOME | End: 2024-07-27
Payer: MEDICARE

## 2024-07-24 PROCEDURE — 97112 NEUROMUSCULAR REEDUCATION: CPT

## 2024-07-24 PROCEDURE — 97140 MANUAL THERAPY 1/> REGIONS: CPT

## 2024-07-24 PROCEDURE — 97110 THERAPEUTIC EXERCISES: CPT

## 2024-07-24 NOTE — PROGRESS NOTES
goals / Updated goals:  []  See Progress Note/Recertification    fair Progress to    [] STG    [x] LTG  1 as shown by still having pain and stiffness with ADLs      Next PN/ RC due 8/1/2024  Auth due -    PLAN  Yes Continue plan of care  [x]  Upgrade activities as tolerated  []  Discharge due to :  []  Other:    Martell Saleh PT    7/24/2024    2:48 PM    Future Appointments   Date Time Provider Department Center   7[facility]26[facility]2024 [facility]00 [facility]   7/29/2024 11:00 AM Martell Saleh, PT [facility]   7/31/2024 11:00 AM Martell Saleh, PT [facility]   8/2/2024 11:00 AM Martell Saleh, PT [facility]

## 2024-07-26 ENCOUNTER — HOSPITAL ENCOUNTER (OUTPATIENT)
Facility: HOSPITAL | Age: 84
Setting detail: RECURRING SERIES
Discharge: HOME | End: 2024-07-29
Payer: MEDICARE

## 2024-07-26 PROCEDURE — 97110 THERAPEUTIC EXERCISES: CPT

## 2024-07-26 PROCEDURE — 97112 NEUROMUSCULAR REEDUCATION: CPT

## 2024-07-26 PROCEDURE — 97140 MANUAL THERAPY 1/> REGIONS: CPT

## 2024-07-26 NOTE — PROGRESS NOTES
PHYSICAL / OCCUPATIONAL THERAPY - DAILY TREATMENT NOTE (updated )    Patient Name: Elizabeth Galicia    Date: 2024    : 1940  Insurance: Payor: MEDICARE / Plan: MEDICARE PART A AND B / Product Type: *No Product type* /      Patient  verified Yes   Visit #   Current / Total 9 12   Time   In / Out 11;00 11:50   Pain   In / Out 0-2 0-2   Subjective Functional Status/Changes: Pt reports stiffness in the R knee     TREATMENT AREA =  Other low back pain [M54.59]    OBJECTIVE    Modalities Rationale:     decrease inflammation and decrease pain to improve patient's ability to progress to PLOF and address remaining functional goals.     min [] Estim Unattended, type/location:                                      []  w/ice    []  w/heat    min [] Estim Attended, type/location:                                     []  w/US     []  w/ice    []  w/heat    []  TENS insruct      min []  Mechanical Traction: type/lbs                   []  pro   []  sup   []  int   []  cont    []  before manual    []  after manual    min []  Ultrasound, settings/location:      min  unbill []  Ice     []  Heat    location/position:     min []  Paraffin,  details:     min []  Vasopneumatic Device, press/temp:     min []  Whirlpool / Fluido:    If using vaso (only need to measure limb vaso being performed on)      pre-treatment girth :       post-treatment girth :       measured at (landmark location) :      min []  Other:    Skin assessment post-treatment:   Intact      Therapeutic Procedures:  Tx Min Billable or 1:1 Min (if diff from Tx Min) Procedure, Rationale, Specifics   15 15 62755 Therapeutic Exercise (timed):  increase ROM, strength, coordination, balance, and proprioception to improve patient's ability to progress to PLOF and address remaining functional goals. (see flow sheet as applicable)     Details if applicable:       15 15 34616 Neuromuscular Re-Education (timed):  improve balance, coordination, kinesthetic sense,

## 2024-07-29 ENCOUNTER — APPOINTMENT (OUTPATIENT)
Facility: HOSPITAL | Age: 84
End: 2024-07-29
Payer: MEDICARE

## 2024-07-31 ENCOUNTER — HOSPITAL ENCOUNTER (OUTPATIENT)
Facility: HOSPITAL | Age: 84
Setting detail: RECURRING SERIES
Discharge: HOME | End: 2024-08-03
Payer: MEDICARE

## 2024-07-31 PROCEDURE — 97140 MANUAL THERAPY 1/> REGIONS: CPT

## 2024-07-31 PROCEDURE — 97112 NEUROMUSCULAR REEDUCATION: CPT

## 2024-07-31 PROCEDURE — 97110 THERAPEUTIC EXERCISES: CPT

## 2024-07-31 NOTE — PROGRESS NOTES
[facility]AD[facility]   [address], [address], [place] [address] Ph: [phone] Fx: [phone]  PHYSICAL THERAPY PROGRESS NOTE  Patient Name: Elizabeth Galicia : 1940   Treatment/Medical Diagnosis: Other low back pain [M54.59]   Referral Source: Radha Tapia MD     Date of Initial Visit: 2024 Attended Visits: 10 Missed Visits: -     SUMMARY OF TREATMENT  PT consisted of manual therapy techniques, therapeutic exercises, and modalities to improve strength, improve mobility, decrease pain, and improve overall function.       CURRENT STATUS  Pt showing little overall improvement with R knee pain. Pt reports good overall improvement with L/S pain. Pt still lacking full knee flexion and is showing increased swelling in R knee. Pt demonstrating decreased hamstring mobility. Pt reports pain at 0-3/10.      Increase score on Oswestry to<  or = to 20% points to demo an increase in functional activity tolerance with the LE   Status at last Eval: see Eval  Current Status: DNA  Goal Met?  no    2.  Pt will note < or = 2/10 pain with all mobility to improve comfort with ADL’s.   Status at last Eval: see eval  Current Status: 0-3  Goal Met?  no    3. Pt will demonstrate a GROC score of >/= +5 to show overall improvement in function   Status at last Eval: NA  Current Status: DNA  Goal Met?  no      Payor: MEDICARE / Plan: MEDICARE PART A AND B / Product Type: *No Product type* /     Medicare, cannot change goals, cannot adjust frequency/duration, no signature required   Reporting Period: (date from last Prog Note/Eval to current Prog Note/Recert)  2024 - 2024    RECOMMENDATIONS  Patient would benefit from the continuation of skilled rehab interventions, per initial Plan of Care or most recent Medicare Recert, for functional progress to achieving above stated clinically significant goals.    If you have any questions/comments please contact us

## 2024-07-31 NOTE — PROGRESS NOTES
PHYSICAL / OCCUPATIONAL THERAPY - DAILY TREATMENT NOTE (updated )    Patient Name: Elizabeth Galicia    Date: 2024    : 1940  Insurance: Payor: MEDICARE / Plan: MEDICARE PART A AND B / Product Type: *No Product type* /      Patient  verified Yes   Visit #   Current / Total 10 16   Time   In / Out 11:00 11:50   Pain   In / Out 0-3 0-3   Subjective Functional Status/Changes: See PN     TREATMENT AREA =  Other low back pain [M54.59]    OBJECTIVE    Modalities Rationale:     decrease inflammation and decrease pain to improve patient's ability to progress to PLOF and address remaining functional goals.     min [] Estim Unattended, type/location:                                      []  w/ice    []  w/heat    min [] Estim Attended, type/location:                                     []  w/US     []  w/ice    []  w/heat    []  TENS insruct      min []  Mechanical Traction: type/lbs                   []  pro   []  sup   []  int   []  cont    []  before manual    []  after manual    min []  Ultrasound, settings/location:      min  unbill []  Ice     []  Heat    location/position:     min []  Paraffin,  details:     min []  Vasopneumatic Device, press/temp:     min []  Whirlpool / Fluido:    If using vaso (only need to measure limb vaso being performed on)      pre-treatment girth :       post-treatment girth :       measured at (landmark location) :      min []  Other:    Skin assessment post-treatment:   Intact      Therapeutic Procedures:  Tx Min Billable or 1:1 Min (if diff from Tx Min) Procedure, Rationale, Specifics   15 15 26770 Therapeutic Exercise (timed):  increase ROM, strength, coordination, balance, and proprioception to improve patient's ability to progress to PLOF and address remaining functional goals. (see flow sheet as applicable)     Details if applicable:       15 15 72064 Neuromuscular Re-Education (timed):  improve balance, coordination, kinesthetic sense, posture, core stability and

## 2024-08-02 ENCOUNTER — HOSPITAL ENCOUNTER (OUTPATIENT)
Facility: HOSPITAL | Age: 84
Setting detail: RECURRING SERIES
Discharge: HOME | End: 2024-08-05
Payer: MEDICARE

## 2024-08-02 PROCEDURE — 97112 NEUROMUSCULAR REEDUCATION: CPT

## 2024-08-02 PROCEDURE — 97110 THERAPEUTIC EXERCISES: CPT

## 2024-08-02 PROCEDURE — 97140 MANUAL THERAPY 1/> REGIONS: CPT

## 2024-08-02 NOTE — PROGRESS NOTES
PHYSICAL / OCCUPATIONAL THERAPY - DAILY TREATMENT NOTE (updated )    Patient Name: Elizabeth Galicia    Date: 2024    : 1940  Insurance: Payor: MEDICARE / Plan: MEDICARE PART A AND B / Product Type: *No Product type* /      Patient  verified Yes   Visit #   Current / Total 11 16   Time   In / Out 11:00 11:50   Pain   In / Out 0-3 0-3   Subjective Functional Status/Changes: Pt reports pain in the knee still from swelling feeling. Back pain feeling a little better. Still hasn't heard anything from [facility]     TREATMENT AREA =  Other low back pain [M54.59]    OBJECTIVE    Modalities Rationale:     decrease inflammation and decrease pain to improve patient's ability to progress to PLOF and address remaining functional goals.     min [] Estim Unattended, type/location:                                      []  w/ice    []  w/heat    min [] Estim Attended, type/location:                                     []  w/US     []  w/ice    []  w/heat    []  TENS insruct      min []  Mechanical Traction: type/lbs                   []  pro   []  sup   []  int   []  cont    []  before manual    []  after manual    min []  Ultrasound, settings/location:      min  unbill []  Ice     []  Heat    location/position:     min []  Paraffin,  details:     min []  Vasopneumatic Device, press/temp:     min []  Whirlpool / Fluido:    If using vaso (only need to measure limb vaso being performed on)      pre-treatment girth :       post-treatment girth :       measured at (landmark location) :      min []  Other:    Skin assessment post-treatment:   Intact      Therapeutic Procedures:  Tx Min Billable or 1:1 Min (if diff from Tx Min) Procedure, Rationale, Specifics   15  30280 Therapeutic Exercise (timed):  increase ROM, strength, coordination, balance, and proprioception to improve patient's ability to progress to PLOF and address remaining functional goals. (see flow sheet as applicable)     Details if applicable:       15  89445

## 2024-08-06 ENCOUNTER — HOSPITAL ENCOUNTER (OUTPATIENT)
Facility: HOSPITAL | Age: 84
Setting detail: RECURRING SERIES
Discharge: HOME | End: 2024-08-09
Payer: MEDICARE

## 2024-08-06 PROCEDURE — 97110 THERAPEUTIC EXERCISES: CPT

## 2024-08-06 PROCEDURE — 97112 NEUROMUSCULAR REEDUCATION: CPT

## 2024-08-06 PROCEDURE — 97140 MANUAL THERAPY 1/> REGIONS: CPT

## 2024-08-06 NOTE — PROGRESS NOTES
functional deficits and attain remaining goals.    Progress toward goals / Updated goals:  []  See Progress Note/Recertification    Good Progress to    [] STG    [x] LTG  1 as shown by improved overall pain levels with ADLs      Next PN/ RC due 8/30/2024  Auth due -    PLAN  Yes Continue plan of care  [x]  Upgrade activities as tolerated  []  Discharge due to :  []  Other:    Martell Saleh PT    8/6/2024    12:00 PM    Future Appointments   Date Time Provider Department Center   8/9/2024 11:40 AM Martell Saleh PT [facility]   8/12/2024  1:40 PM Martell Saleh PT [facility]   8/23/2024 10:20 AM Martell Saleh PT [facility]   8/26/2024 10:20 AM Martell Saleh, PT [facility]   8/28/2024  1:40 PM Martell Saleh, PT [facility]

## 2024-08-09 ENCOUNTER — HOSPITAL ENCOUNTER (OUTPATIENT)
Facility: HOSPITAL | Age: 84
Setting detail: RECURRING SERIES
Discharge: HOME | End: 2024-08-12
Payer: MEDICARE

## 2024-08-09 PROCEDURE — 97530 THERAPEUTIC ACTIVITIES: CPT

## 2024-08-09 PROCEDURE — 97112 NEUROMUSCULAR REEDUCATION: CPT

## 2024-08-09 PROCEDURE — 97110 THERAPEUTIC EXERCISES: CPT

## 2024-08-09 NOTE — PROGRESS NOTES
PHYSICAL / OCCUPATIONAL THERAPY - DAILY TREATMENT NOTE (updated )    Patient Name: Elizabeth Galicia    Date: 2024    : 1940  Insurance: Payor: MEDICARE / Plan: MEDICARE PART A AND B / Product Type: *No Product type* /      Patient  verified Yes   Visit #   Current / Total 13 15   Time   In / Out 11:40 12:30   Pain   In / Out 0-3 0-3   Subjective Functional Status/Changes: Pt reports having pain in the R knee still with ADLs     TREATMENT AREA =  Other low back pain [M54.59]    OBJECTIVE    Modalities Rationale:     decrease inflammation and decrease pain to improve patient's ability to progress to PLOF and address remaining functional goals.     min [] Estim Unattended, type/location:                                      []  w/ice    []  w/heat    min [] Estim Attended, type/location:                                     []  w/US     []  w/ice    []  w/heat    []  TENS insruct      min []  Mechanical Traction: type/lbs                   []  pro   []  sup   []  int   []  cont    []  before manual    []  after manual    min []  Ultrasound, settings/location:     10 min  unbill [x]  Ice     []  Heat    location/position: L/S and R knee    min []  Paraffin,  details:     min []  Vasopneumatic Device, press/temp:     min []  Whirlpool / Fluido:    If using vaso (only need to measure limb vaso being performed on)      pre-treatment girth :       post-treatment girth :       measured at (landmark location) :      min []  Other:    Skin assessment post-treatment:   Intact      Therapeutic Procedures:  Tx Min Billable or 1:1 Min (if diff from Tx Min) Procedure, Rationale, Specifics   15 15 99659 Therapeutic Exercise (timed):  increase ROM, strength, coordination, balance, and proprioception to improve patient's ability to progress to PLOF and address remaining functional goals. (see flow sheet as applicable)     Details if applicable:       15 15 27880 Neuromuscular Re-Education (timed):  improve balance,

## 2024-08-12 ENCOUNTER — APPOINTMENT (OUTPATIENT)
Facility: HOSPITAL | Age: 84
End: 2024-08-12
Payer: MEDICARE

## 2024-08-13 ENCOUNTER — APPOINTMENT (OUTPATIENT)
Facility: HOSPITAL | Age: 84
End: 2024-08-13
Payer: MEDICARE

## 2024-08-23 ENCOUNTER — APPOINTMENT (OUTPATIENT)
Facility: HOSPITAL | Age: 84
End: 2024-08-23
Payer: MEDICARE

## 2024-08-26 ENCOUNTER — APPOINTMENT (OUTPATIENT)
Facility: HOSPITAL | Age: 84
End: 2024-08-26
Payer: MEDICARE

## 2024-08-27 ENCOUNTER — APPOINTMENT (OUTPATIENT)
Facility: HOSPITAL | Age: 84
End: 2024-08-27
Payer: MEDICARE

## 2024-08-28 ENCOUNTER — APPOINTMENT (OUTPATIENT)
Facility: HOSPITAL | Age: 84
End: 2024-08-28
Payer: MEDICARE

## 2024-09-04 ENCOUNTER — APPOINTMENT (OUTPATIENT)
Facility: HOSPITAL | Age: 84
End: 2024-09-04
Payer: MEDICARE

## 2024-09-06 ENCOUNTER — HOSPITAL ENCOUNTER (OUTPATIENT)
Facility: HOSPITAL | Age: 84
Setting detail: RECURRING SERIES
Discharge: HOME | End: 2024-09-09
Payer: MEDICARE

## 2024-09-06 PROCEDURE — 97140 MANUAL THERAPY 1/> REGIONS: CPT

## 2024-09-06 PROCEDURE — 97110 THERAPEUTIC EXERCISES: CPT

## 2024-09-06 PROCEDURE — 97112 NEUROMUSCULAR REEDUCATION: CPT

## 2024-09-30 ENCOUNTER — APPOINTMENT (OUTPATIENT)
Facility: HOSPITAL | Age: 84
End: 2024-09-30
Payer: MEDICARE

## 2024-10-01 ENCOUNTER — HOSPITAL ENCOUNTER (OUTPATIENT)
Facility: HOSPITAL | Age: 84
Setting detail: RECURRING SERIES
Discharge: HOME | End: 2024-10-04
Payer: MEDICARE

## 2024-10-01 PROCEDURE — 97110 THERAPEUTIC EXERCISES: CPT

## 2024-10-01 PROCEDURE — 97535 SELF CARE MNGMENT TRAINING: CPT

## 2024-10-01 PROCEDURE — 97140 MANUAL THERAPY 1/> REGIONS: CPT

## 2024-10-01 NOTE — PROGRESS NOTES
PHYSICAL / OCCUPATIONAL THERAPY - DAILY TREATMENT NOTE (updated )    Patient Name: Elizabeth Galicia    Date: 10/1/2024    : 1940  Insurance: Payor: MEDICARE / Plan: MEDICARE PART A AND B / Product Type: *No Product type* /      Patient  verified Yes   Visit #   Current / Total 15 24   Time   In / Out 11:00 11:50   Pain   In / Out 0-4 0-4   Subjective Functional Status/Changes: Pt reports pain in the back and knee. Pt reports she is getting an injection in the back today and wants to avoid back exercises     TREATMENT AREA =  Other low back pain [M54.59]    OBJECTIVE    Modalities Rationale:     decrease inflammation and decrease pain to improve patient's ability to progress to PLOF and address remaining functional goals.     min [] Estim Unattended, type/location:                                      []  w/ice    []  w/heat    min [] Estim Attended, type/location:                                     []  w/US     []  w/ice    []  w/heat    []  TENS insruct      min []  Mechanical Traction: type/lbs                   []  pro   []  sup   []  int   []  cont    []  before manual    []  after manual    min []  Ultrasound, settings/location:      min  unbill []  Ice     []  Heat    location/position:     min []  Paraffin,  details:     min []  Vasopneumatic Device, press/temp:     min []  Whirlpool / Fluido:    If using vaso (only need to measure limb vaso being performed on)      pre-treatment girth :       post-treatment girth :       measured at (landmark location) :      min []  Other:    Skin assessment post-treatment:   Intact      Therapeutic Procedures:  Tx Min Billable or 1:1 Min (if diff from Tx Min) Procedure, Rationale, Specifics   15  40912 Therapeutic Exercise (timed):  increase ROM, strength, coordination, balance, and proprioception to improve patient's ability to progress to PLOF and address remaining functional goals. (see flow sheet as applicable)     Details if applicable:         [id number]

## 2024-10-04 ENCOUNTER — APPOINTMENT (OUTPATIENT)
Facility: HOSPITAL | Age: 84
End: 2024-10-04
Payer: MEDICARE

## 2024-10-08 ENCOUNTER — APPOINTMENT (OUTPATIENT)
Facility: HOSPITAL | Age: 84
End: 2024-10-08
Payer: MEDICARE

## 2024-10-11 ENCOUNTER — APPOINTMENT (OUTPATIENT)
Facility: HOSPITAL | Age: 84
End: 2024-10-11
Payer: MEDICARE

## 2024-10-15 ENCOUNTER — APPOINTMENT (OUTPATIENT)
Facility: HOSPITAL | Age: 84
End: 2024-10-15
Payer: MEDICARE

## 2024-10-18 ENCOUNTER — APPOINTMENT (OUTPATIENT)
Facility: HOSPITAL | Age: 84
End: 2024-10-18
Payer: MEDICARE

## 2024-10-22 ENCOUNTER — APPOINTMENT (OUTPATIENT)
Facility: HOSPITAL | Age: 84
End: 2024-10-22
Payer: MEDICARE

## 2024-10-25 ENCOUNTER — APPOINTMENT (OUTPATIENT)
Facility: HOSPITAL | Age: 84
End: 2024-10-25
Payer: MEDICARE

## 2024-11-08 ENCOUNTER — APPOINTMENT (OUTPATIENT)
Facility: HOSPITAL | Age: 84
End: 2024-11-08
Payer: MEDICARE

## 2024-11-12 ENCOUNTER — HOSPITAL ENCOUNTER (OUTPATIENT)
Facility: HOSPITAL | Age: 84
Setting detail: RECURRING SERIES
Discharge: HOME | End: 2024-11-15
Payer: MEDICARE

## 2024-11-12 PROCEDURE — 97112 NEUROMUSCULAR REEDUCATION: CPT

## 2024-11-12 PROCEDURE — 97530 THERAPEUTIC ACTIVITIES: CPT

## 2024-11-12 PROCEDURE — 97110 THERAPEUTIC EXERCISES: CPT

## 2024-11-12 NOTE — THERAPY DISCHARGE
[facility]   [address], [address], [place] [address] Ph: [phone] Fx: [phone]  DISCHARGE SUMMARY  Patient Name: Elizabeth Galicia : 1940   Treatment/Medical Diagnosis: Other low back pain [M54.59]   Referral Source: Radha Tapia MD     Date of Initial Visit: 2024 Attended Visits: 16 Missed Visits: -     SUMMARY OF TREATMENT  PT consisted of manual therapy techniques, therapeutic exercises, and modalities to improve strength, improve mobility, decrease pain, and improve overall function.       CURRENT STATUS  Pt reports she is not longer having pain with her knee or her back. Pt still has lingering pain from a fall that caused rib fractures. Pt do discharge from care for her knee and back to work on balance to decreased risk of recurring falls.        Increase score on Oswestry to<  or = to 20% points to demo an increase in functional activity tolerance with the LE   Status at last Eval: see Eval  Current Status DNA  Goal Met?  no     2.  Pt will note < or = 2/10 pain with all mobility to improve comfort with ADL’s.   Status at last Eval: see eval  Current Status: 0-2  Goal Met?  yes  3. Pt will demonstrate a GROC score of >/= +5 to show overall improvement in function   Status at last Eval: NA  Current Status: +5  Goal Met?  yes        Medicare: Reporting Period (date from last assessment to current assessment): 2024-2024    RECOMMENDATIONS  Discontinue therapy. Progressing towards or have reached established goals.    If you have any questions/comments please contact us directly at [phone].   Thank you for allowing us to assist in the care of your patient.    Martell Saleh, PT       2024       12:26 PM    Not Medicaid Ins, no signature required for DC

## 2024-11-12 NOTE — PROGRESS NOTES
PHYSICAL / OCCUPATIONAL THERAPY - DAILY TREATMENT NOTE (updated )    Patient Name: Elizabeth Galicia    Date: 2024    : 1940  Insurance: Payor: MEDICARE / Plan: MEDICARE PART A AND B / Product Type: *No Product type* /      Patient  verified Yes   Visit #   Current / Total 16 16   Time   In / Out 11:40 12:30   Pain   In / Out 0 0   Subjective Functional Status/Changes: See D/C     TREATMENT AREA =  Other low back pain [M54.59]    OBJECTIVE    Modalities Rationale:     decrease inflammation and decrease pain to improve patient's ability to progress to PLOF and address remaining functional goals.     min [] Estim Unattended, type/location:                                      []  w/ice    []  w/heat    min [] Estim Attended, type/location:                                     []  w/US     []  w/ice    []  w/heat    []  TENS insruct      min []  Mechanical Traction: type/lbs                   []  pro   []  sup   []  int   []  cont    []  before manual    []  after manual    min []  Ultrasound, settings/location:     10 min  unbill [x]  Ice     []  Heat    location/position: ribs    min []  Paraffin,  details:     min []  Vasopneumatic Device, press/temp:     min []  Whirlpool / Fluido:    If using vaso (only need to measure limb vaso being performed on)      pre-treatment girth :       post-treatment girth :       measured at (landmark location) :      min []  Other:    Skin assessment post-treatment:   Intact      Therapeutic Procedures:  Tx Min Billable or 1:1 Min (if diff from Tx Min) Procedure, Rationale, Specifics   15 15 69084 Therapeutic Exercise (timed):  increase ROM, strength, coordination, balance, and proprioception to improve patient's ability to progress to PLOF and address remaining functional goals. (see flow sheet as applicable)     Details if applicable:       15 15 72623 Neuromuscular Re-Education (timed):  improve balance, coordination, kinesthetic sense, posture, core stability

## 2024-11-15 ENCOUNTER — HOSPITAL ENCOUNTER (OUTPATIENT)
Facility: HOSPITAL | Age: 84
Setting detail: RECURRING SERIES
End: 2024-11-15
Payer: MEDICARE

## 2024-11-15 ENCOUNTER — APPOINTMENT (OUTPATIENT)
Facility: HOSPITAL | Age: 84
End: 2024-11-15
Payer: MEDICARE

## 2024-11-19 ENCOUNTER — HOSPITAL ENCOUNTER (OUTPATIENT)
Facility: HOSPITAL | Age: 84
Setting detail: RECURRING SERIES
Discharge: HOME | End: 2024-11-22
Payer: MEDICARE

## 2024-11-19 ENCOUNTER — APPOINTMENT (OUTPATIENT)
Facility: HOSPITAL | Age: 84
End: 2024-11-19
Payer: MEDICARE

## 2024-11-19 PROCEDURE — 97112 NEUROMUSCULAR REEDUCATION: CPT

## 2024-11-19 PROCEDURE — 97161 PT EVAL LOW COMPLEX 20 MIN: CPT

## 2024-11-19 NOTE — THERAPY EVALUATION
[facility] - [facility]  [address], [address], [place] [address] - Phone: [phone]  Fax: [phone]  PLAN OF CARE / STATEMENT OF MEDICAL NECESSITY FOR PHYSICAL THERAPY SERVICES  Patient Name: Elizabeth Galicia : 1940   Treatment   Diagnosis: R26.89  Abnormalities of gait and mobility and M62.81  GENERAL MUSCLE WEAKNESS  Medical Diagnosis: Other abnormalities of gait and mobility [R26.89]   Onset Date: Early 2024     Referral Source: Radha Tapia MD Start of Care (SOC): 2024   Prior Hospitalization: See medical history Provider #: [id number]   Prior Level of Function: Pt was had off and on balance issues with ADLs   Comorbidities: Other: none   The Plan of Care and following information is based on the information from the initial evaluation.   ===========================================================================================  Patient is a 84 year old female that presents to PT with chief complaints of decreased balance that has been ongoin for a while but has worsened recently. Pt had a fall about 6 weeks ago that resulted in multiple rib fractures. Since the fall, pt reports being fearful of falling again and has felt off balance with ADLs. Pt MMT of B LE's grossly 4/5. Stephenson balance score of 41/56 (below 45 indicated high risk of falling). Dynamic gait index score of 13/24 (below 19 indicated high risk of falling). Pt showing increased fall risk with both functional outcome measures. LEFS: 39/80. Pt showing difficulty with NBOS standing, poor SLS ability and tandem stance ability. Pt demonstrates poor safety with all dynamic balance activities that requires head movements.     Pt will benefit from PT interventions to address the aforementioned deficits and allow pt to return to PLOF.    Not post operative  ===========================================================================================  Evaluation Complexity:

## 2024-11-19 NOTE — PROGRESS NOTES
PHYSICAL / OCCUPATIONAL THERAPY - DAILY TREATMENT NOTE (updated )    Patient Name: Elizabeth Galicia    Date: 2024    : 1940  Insurance: Payor: MEDICARE / Plan: MEDICARE PART A AND B / Product Type: *No Product type* /      Patient  verified Yes   Visit #   Current / Total 1 16   Time   In / Out 11:40 12:20   Pain   In / Out 0 0   Subjective Functional Status/Changes: See Eval     TREATMENT AREA =  Other abnormalities of gait and mobility [R26.89]    OBJECTIVE         Therapeutic Procedures:  Tx Min Billable or 1:1 Min (if diff from Tx Min) Procedure, Rationale, Specifics     71693 Therapeutic Exercise (timed):  increase ROM, strength, coordination, balance, and proprioception to improve patient's ability to progress to PLOF and address remaining functional goals. (see flow sheet as applicable)     Details if applicable:       10 10 36694 Neuromuscular Re-Education (timed):  improve balance, coordination, kinesthetic sense, posture, core stability and proprioception to improve patient's ability to develop conscious control of individual muscles and awareness of position of extremities in order to progress to PLOF and address remaining functional goals. (see flow sheet as applicable)     Details if applicable:       72280 Manual Therapy (timed):  decrease pain, increase ROM, and increase tissue extensibility to improve patient's ability to progress to PLOF and address remaining functional goals.  The manual therapy interventions were performed at a separate and distinct time from the therapeutic activities interventions . (see flow sheet as applicable)     Details if applicable:       92362 Therapeutic Activity (timed):  use of dynamic activities replicating functional movements to increase ROM, strength, coordination, balance, and proprioception in order to improve patient's ability to progress to PLOF and address remaining functional goals.  (see flow sheet as applicable)     Details if

## 2024-11-22 ENCOUNTER — APPOINTMENT (OUTPATIENT)
Facility: HOSPITAL | Age: 84
End: 2024-11-22
Payer: MEDICARE

## 2024-11-22 ENCOUNTER — HOSPITAL ENCOUNTER (OUTPATIENT)
Facility: HOSPITAL | Age: 84
Setting detail: RECURRING SERIES
Discharge: HOME | End: 2024-11-25
Payer: MEDICARE

## 2024-11-22 PROCEDURE — 97112 NEUROMUSCULAR REEDUCATION: CPT

## 2024-11-22 PROCEDURE — 97110 THERAPEUTIC EXERCISES: CPT

## 2024-11-22 PROCEDURE — 97530 THERAPEUTIC ACTIVITIES: CPT

## 2024-11-22 NOTE — PROGRESS NOTES
11:00 AM Martell Saleh, PT [facility]   1/8/2025 11:00 AM Martell Saleh, PT [facility]   1/10/2025 11:00 AM Martell Saleh, PT [facility]   1/13/2025 11:00 AM Martell Saleh, PT [facility]   1/15/2025 11:00 AM Martell Saleh, PT [facility]   1/17/2025 11:00 AM Martell Saleh, PT [facility]

## 2024-11-26 ENCOUNTER — HOSPITAL ENCOUNTER (OUTPATIENT)
Facility: HOSPITAL | Age: 84
Setting detail: RECURRING SERIES
Discharge: HOME | End: 2024-11-29
Payer: MEDICARE

## 2024-11-26 ENCOUNTER — APPOINTMENT (OUTPATIENT)
Facility: HOSPITAL | Age: 84
End: 2024-11-26
Payer: MEDICARE

## 2024-11-26 PROCEDURE — 97112 NEUROMUSCULAR REEDUCATION: CPT

## 2024-11-26 PROCEDURE — 97110 THERAPEUTIC EXERCISES: CPT

## 2024-11-26 PROCEDURE — 97530 THERAPEUTIC ACTIVITIES: CPT

## 2024-11-26 NOTE — PROGRESS NOTES
Martell Saleh, PT [facility]   1/13/2025 11:00 AM Martell Saleh, PT [facility]   1/15/2025 11:00 AM Martell Saleh, PT [facility]   1/17/2025 11:00 AM Martell Saleh, PT [facility]

## 2024-12-04 ENCOUNTER — HOSPITAL ENCOUNTER (OUTPATIENT)
Facility: HOSPITAL | Age: 84
Setting detail: RECURRING SERIES
Discharge: HOME | End: 2024-12-07
Payer: MEDICARE

## 2024-12-04 PROCEDURE — 97112 NEUROMUSCULAR REEDUCATION: CPT

## 2024-12-04 PROCEDURE — 97110 THERAPEUTIC EXERCISES: CPT

## 2024-12-04 PROCEDURE — 97530 THERAPEUTIC ACTIVITIES: CPT

## 2024-12-04 NOTE — PROGRESS NOTES
PHYSICAL / OCCUPATIONAL THERAPY - DAILY TREATMENT NOTE (updated )    Patient Name: Elizabeth Galicia    Date: 2024    : 1940  Insurance: Payor: MEDICARE / Plan: MEDICARE PART A AND B / Product Type: *No Product type* /      Patient  verified Yes   Visit #   Current / Total 4 16   Time   In / Out 9:50 10:30   Pain   In / Out 0 0   Subjective Functional Status/Changes: Pt reports feeling off balance with walkin     TREATMENT AREA =  Other abnormalities of gait and mobility [R26.89]    OBJECTIVE         Therapeutic Procedures:  Tx Min Billable or 1:1 Min (if diff from Tx Min) Procedure, Rationale, Specifics   15 15 52313 Therapeutic Exercise (timed):  increase ROM, strength, coordination, balance, and proprioception to improve patient's ability to progress to PLOF and address remaining functional goals. (see flow sheet as applicable)     Details if applicable:       15 15 02310 Neuromuscular Re-Education (timed):  improve balance, coordination, kinesthetic sense, posture, core stability and proprioception to improve patient's ability to develop conscious control of individual muscles and awareness of position of extremities in order to progress to PLOF and address remaining functional goals. (see flow sheet as applicable)     Details if applicable:       14148 Manual Therapy (timed):  decrease pain, increase ROM, and increase tissue extensibility to improve patient's ability to progress to PLOF and address remaining functional goals.  The manual therapy interventions were performed at a separate and distinct time from the therapeutic activities interventions . (see flow sheet as applicable)     Details if applicable:     10 10 92876 Therapeutic Activity (timed):  use of dynamic activities replicating functional movements to increase ROM, strength, coordination, balance, and proprioception in order to improve patient's ability to progress to PLOF and address remaining functional goals.  (see flow sheet

## 2024-12-06 ENCOUNTER — HOSPITAL ENCOUNTER (OUTPATIENT)
Facility: HOSPITAL | Age: 84
Setting detail: RECURRING SERIES
Discharge: HOME | End: 2024-12-09
Payer: MEDICARE

## 2024-12-06 PROCEDURE — 97112 NEUROMUSCULAR REEDUCATION: CPT

## 2024-12-06 PROCEDURE — 97530 THERAPEUTIC ACTIVITIES: CPT

## 2024-12-06 NOTE — PROGRESS NOTES
PHYSICAL / OCCUPATIONAL THERAPY - DAILY TREATMENT NOTE (updated )    Patient Name: Elizabeth Galicia    Date: 2024    : 1940  Insurance: Payor: MEDICARE / Plan: MEDICARE PART A AND B / Product Type: *No Product type* /      Patient  verified Yes   Visit #   Current / Total 5 16   Time   In / Out 9:45 10:15   Pain   In / Out 0 0   Subjective Functional Status/Changes: Pt reports no falls recently. Pt reports improved walking overall     TREATMENT AREA =  Other abnormalities of gait and mobility [R26.89]    OBJECTIVE         Therapeutic Procedures:  Tx Min Billable or 1:1 Min (if diff from Tx Min) Procedure, Rationale, Specifics     75038 Therapeutic Exercise (timed):  increase ROM, strength, coordination, balance, and proprioception to improve patient's ability to progress to PLOF and address remaining functional goals. (see flow sheet as applicable)     Details if applicable:       15 15 30869 Neuromuscular Re-Education (timed):  improve balance, coordination, kinesthetic sense, posture, core stability and proprioception to improve patient's ability to develop conscious control of individual muscles and awareness of position of extremities in order to progress to PLOF and address remaining functional goals. (see flow sheet as applicable)     Details if applicable:       00202 Manual Therapy (timed):  decrease pain, increase ROM, and increase tissue extensibility to improve patient's ability to progress to PLOF and address remaining functional goals.  The manual therapy interventions were performed at a separate and distinct time from the therapeutic activities interventions . (see flow sheet as applicable)     Details if applicable:     15 15 92852 Therapeutic Activity (timed):  use of dynamic activities replicating functional movements to increase ROM, strength, coordination, balance, and proprioception in order to improve patient's ability to progress to PLOF and address remaining functional

## 2024-12-10 ENCOUNTER — APPOINTMENT (OUTPATIENT)
Facility: HOSPITAL | Age: 84
End: 2024-12-10
Payer: MEDICARE

## 2024-12-11 ENCOUNTER — HOSPITAL ENCOUNTER (OUTPATIENT)
Facility: HOSPITAL | Age: 84
Setting detail: RECURRING SERIES
Discharge: HOME | End: 2024-12-14
Payer: MEDICARE

## 2024-12-11 PROCEDURE — 97110 THERAPEUTIC EXERCISES: CPT

## 2024-12-11 PROCEDURE — 97530 THERAPEUTIC ACTIVITIES: CPT

## 2024-12-11 PROCEDURE — 97112 NEUROMUSCULAR REEDUCATION: CPT

## 2024-12-11 NOTE — PROGRESS NOTES
functional goals.  (see flow sheet as applicable)     Details if applicable:            Details if applicable:     40 40  BC Totals Reminder: bill using total billable min of TIMED therapeutic procedures (example: do not include dry needle or estim unattended, both untimed codes, in totals to left)  8-22 min = 1 unit; 23-37 min = 2 units; 38-52 min = 3 units; 53-67 min = 4 units; 68-82 min = 5 units   Total Total     [x]  Patient Education billed concurrently with other procedures   [x] Review HEP    [] Progressed/Changed HEP, detail:    [] Other detail:       Objective Information/Functional Measures/Assessment    Pt showing difficulty with walking and standing activities that require head movement    Patient will continue to benefit from skilled PT / OT services to modify and progress therapeutic interventions, analyze and address functional mobility deficits, analyze and address strength deficits, and analyze and address imbalance/dizziness to address functional deficits and attain remaining goals.    Progress toward goals / Updated goals:  []  See Progress Note/Recertification    Good Progress to    [] STG    [x] LTG  1 as shown by improved overall pain levels with ADLs      Next PN/ RC due 12/19/2024  Auth due -    PLAN  Yes Continue plan of care  [x]  Upgrade activities as tolerated  []  Discharge due to :  []  Other:    Martell Saleh PT    12/11/2024    7:31 AM    Future Appointments   Date Time Provider Department Center   12/11/2024 11:40 AM Martell Saleh PT [facility]   12/16/2024 12:20 PM Martell Saleh PT [facility]   12/20/2024 11:40 AM Martell Saleh PT [facility]   12/23/2024 12:20 PM Martell Saleh PT [facility]   1/7/2025 11:00 AM Martell Saleh PT [facility]   1/8/2025 11:00 AM Martell Saleh PT [facility]   1/10/2025 11:00 AM Martell Saleh PT [facility]   1/13/2025 11:00 AM Martell Saleh PT [facility]   1/15/2025 11:00 AM Martell Saleh PT [facility]

## 2024-12-13 ENCOUNTER — APPOINTMENT (OUTPATIENT)
Facility: HOSPITAL | Age: 84
End: 2024-12-13
Payer: MEDICARE

## 2024-12-16 ENCOUNTER — APPOINTMENT (OUTPATIENT)
Facility: HOSPITAL | Age: 84
End: 2024-12-16
Payer: MEDICARE

## 2024-12-18 ENCOUNTER — APPOINTMENT (OUTPATIENT)
Facility: HOSPITAL | Age: 84
End: 2024-12-18
Payer: MEDICARE

## 2024-12-20 ENCOUNTER — APPOINTMENT (OUTPATIENT)
Facility: HOSPITAL | Age: 84
End: 2024-12-20
Payer: MEDICARE

## 2024-12-23 ENCOUNTER — HOSPITAL ENCOUNTER (OUTPATIENT)
Facility: HOSPITAL | Age: 84
Setting detail: RECURRING SERIES
Discharge: HOME | End: 2024-12-26
Payer: MEDICARE

## 2024-12-23 PROCEDURE — 97530 THERAPEUTIC ACTIVITIES: CPT

## 2024-12-23 PROCEDURE — 97112 NEUROMUSCULAR REEDUCATION: CPT

## 2024-12-23 PROCEDURE — 97110 THERAPEUTIC EXERCISES: CPT

## 2024-12-23 NOTE — PROGRESS NOTES
[facility]   [address], [address], [place] [address] Ph: [phone] Fx: [phone]  PHYSICAL THERAPY PROGRESS NOTE  Patient Name: Elizabeth Galicia : 1940   Treatment/Medical Diagnosis: Other abnormalities of gait and mobility [R26.89]   Referral Source: Radha Tapia MD     Date of Initial Visit: 2024 Attended Visits: 7 Missed Visits: -     SUMMARY OF TREATMENT  PT consisted of manual therapy techniques, therapeutic exercises, and modalities to improve strength, improve mobility, decrease pain, and improve overall function.       CURRENT STATUS  Pt showing improved overall static balance. Pt still demonstrating poor dynamic balance. Especially having difficulty with head turning with walking. Pt showing less pain in the ribs from fractures and it able to do more pain free. Pt still unable to SLS > 15secs.      Increase score on Stephenson balance score to > or = to 45 points to demo a decrease in fall risk with ADLs.   Status at last Eval: see eval  Current Status: DNA  Goal Met?  no    2.  Pt will note > or =  on DGI to show improved fall risk with ADLs   Status at last Eval: See Eval  Current Status: DNA  Goal Met?  no    3. Pt will demonstrate a LEFS score of >/= 60 to show overall improvement in function   Status at last Eval: See eval  Current Status: DNA  Goal Met?  no      Payor: MEDICARE / Plan: MEDICARE PART A AND B / Product Type: *No Product type* /     Medicare, cannot change goals, cannot adjust frequency/duration, no signature required   Reporting Period: (date from last Prog Note/Eval to current Prog Note/Recert)  2024 - 2024    RECOMMENDATIONS  Patient would benefit from the continuation of skilled rehab interventions, per initial Plan of Care or most recent Medicare Recert, for functional progress to achieving above stated clinically significant goals.    If you have any questions/comments please contact

## 2024-12-23 NOTE — PROGRESS NOTES
PHYSICAL / OCCUPATIONAL THERAPY - DAILY TREATMENT NOTE (updated )    Patient Name: Elizabeth Galicia    Date: 2024    : 1940  Insurance: Payor: MEDICARE / Plan: MEDICARE PART A AND B / Product Type: *No Product type* /      Patient  verified Yes   Visit #   Current / Total 7 16   Time   In / Out 12:20 1:00   Pain   In / Out 0 0   Subjective Functional Status/Changes: See PN     TREATMENT AREA =  Other abnormalities of gait and mobility [R26.89]    OBJECTIVE    Modalities Rationale:     decrease inflammation and decrease pain to improve patient's ability to progress to PLOF and address remaining functional goals.     min [] Estim Unattended, type/location:                                      []  w/ice    []  w/heat    min [] Estim Attended, type/location:                                     []  w/US     []  w/ice    []  w/heat    []  TENS insruct      min []  Mechanical Traction: type/lbs                   []  pro   []  sup   []  int   []  cont    []  before manual    []  after manual    min []  Ultrasound, settings/location:      min  unbill []  Ice     []  Heat    location/position:     min []  Paraffin,  details:     min []  Vasopneumatic Device, press/temp:     min []  Whirlpool / Fluido:    If using vaso (only need to measure limb vaso being performed on)      pre-treatment girth :       post-treatment girth :       measured at (landmark location) :      min []  Other:    Skin assessment post-treatment:   Intact      Therapeutic Procedures:  Tx Min Billable or 1:1 Min (if diff from Tx Min) Procedure, Rationale, Specifics   15  Therapeutic Exercise (timed):  increase ROM, strength, coordination, balance, and proprioception to improve patient's ability to progress to PLOF and address remaining functional goals. (see flow sheet as applicable)     Details if applicable:       15 15 06453 Neuromuscular Re-Education (timed):  improve balance, coordination, kinesthetic sense, posture, core  Temp in hospital >= 38 C

## 2025-01-06 ENCOUNTER — APPOINTMENT (OUTPATIENT)
Facility: HOSPITAL | Age: 85
End: 2025-01-06
Payer: MEDICARE

## 2025-01-07 ENCOUNTER — HOSPITAL ENCOUNTER (OUTPATIENT)
Facility: HOSPITAL | Age: 85
Setting detail: RECURRING SERIES
Discharge: HOME | End: 2025-01-10
Payer: MEDICARE

## 2025-01-07 PROCEDURE — 97110 THERAPEUTIC EXERCISES: CPT

## 2025-01-07 PROCEDURE — 97112 NEUROMUSCULAR REEDUCATION: CPT

## 2025-01-07 PROCEDURE — 97530 THERAPEUTIC ACTIVITIES: CPT

## 2025-01-07 NOTE — PROGRESS NOTES
PHYSICAL / OCCUPATIONAL THERAPY - DAILY TREATMENT NOTE (updated )    Patient Name: Elizabeth Galicia    Date: 2025    : 1940  Insurance: Payor: MEDICARE / Plan: MEDICARE PART A AND B / Product Type: *No Product type* /      Patient  verified Yes   Visit #   Current / Total 8 16   Time   In / Out 11:00 11:40   Pain   In / Out - -   Subjective Functional Status/Changes: Pt reports no pain. Pt reports still having difficulty with walking.     TREATMENT AREA =  Other abnormalities of gait and mobility [R26.89]    OBJECTIVE         Therapeutic Procedures:  Tx Min Billable or 1:1 Min (if diff from Tx Min) Procedure, Rationale, Specifics   15 15 47576 Therapeutic Exercise (timed):  increase ROM, strength, coordination, balance, and proprioception to improve patient's ability to progress to PLOF and address remaining functional goals. (see flow sheet as applicable)     Details if applicable:       15 15 72942 Neuromuscular Re-Education (timed):  improve balance, coordination, kinesthetic sense, posture, core stability and proprioception to improve patient's ability to develop conscious control of individual muscles and awareness of position of extremities in order to progress to PLOF and address remaining functional goals. (see flow sheet as applicable)     Details if applicable:       59755 Manual Therapy (timed):  decrease pain, increase ROM, and increase tissue extensibility to improve patient's ability to progress to PLOF and address remaining functional goals.  The manual therapy interventions were performed at a separate and distinct time from the therapeutic activities interventions . (see flow sheet as applicable)     Details if applicable:     10 10 64887 Therapeutic Activity (timed):  use of dynamic activities replicating functional movements to increase ROM, strength, coordination, balance, and proprioception in order to improve patient's ability to progress to PLOF and address remaining

## 2025-01-08 ENCOUNTER — APPOINTMENT (OUTPATIENT)
Facility: HOSPITAL | Age: 85
End: 2025-01-08
Payer: MEDICARE

## 2025-01-10 ENCOUNTER — HOSPITAL ENCOUNTER (OUTPATIENT)
Facility: HOSPITAL | Age: 85
Setting detail: RECURRING SERIES
Discharge: HOME | End: 2025-01-13
Payer: MEDICARE

## 2025-01-10 PROCEDURE — 97530 THERAPEUTIC ACTIVITIES: CPT

## 2025-01-10 PROCEDURE — 97110 THERAPEUTIC EXERCISES: CPT

## 2025-01-10 PROCEDURE — 97112 NEUROMUSCULAR REEDUCATION: CPT

## 2025-01-10 NOTE — PROGRESS NOTES
PHYSICAL / OCCUPATIONAL THERAPY - DAILY TREATMENT NOTE (updated )    Patient Name: Elizabeth Galicia    Date: 1/10/2025    : 1940  Insurance: Payor: MEDICARE / Plan: MEDICARE PART A AND B / Product Type: *No Product type* /      Patient  verified Yes   Visit #   Current / Total 9 16   Time   In / Out 11:00 11:40   Pain   In / Out 0 0   Subjective Functional Status/Changes: Pt reports difficulty with walking     TREATMENT AREA =  Other abnormalities of gait and mobility [R26.89]    OBJECTIVE         Therapeutic Procedures:  Tx Min Billable or 1:1 Min (if diff from Tx Min) Procedure, Rationale, Specifics   15 15 20148 Therapeutic Exercise (timed):  increase ROM, strength, coordination, balance, and proprioception to improve patient's ability to progress to PLOF and address remaining functional goals. (see flow sheet as applicable)     Details if applicable:       15 15 36534 Neuromuscular Re-Education (timed):  improve balance, coordination, kinesthetic sense, posture, core stability and proprioception to improve patient's ability to develop conscious control of individual muscles and awareness of position of extremities in order to progress to PLOF and address remaining functional goals. (see flow sheet as applicable)     Details if applicable:       77191 Manual Therapy (timed):  decrease pain, increase ROM, and increase tissue extensibility to improve patient's ability to progress to PLOF and address remaining functional goals.  The manual therapy interventions were performed at a separate and distinct time from the therapeutic activities interventions . (see flow sheet as applicable)     Details if applicable:     10 10 75849 Therapeutic Activity (timed):  use of dynamic activities replicating functional movements to increase ROM, strength, coordination, balance, and proprioception in order to improve patient's ability to progress to PLOF and address remaining functional goals.  (see flow sheet as

## 2025-01-13 ENCOUNTER — HOSPITAL ENCOUNTER (OUTPATIENT)
Facility: HOSPITAL | Age: 85
Setting detail: RECURRING SERIES
Discharge: HOME | End: 2025-01-16
Payer: MEDICARE

## 2025-01-13 PROCEDURE — 97112 NEUROMUSCULAR REEDUCATION: CPT

## 2025-01-13 PROCEDURE — 97110 THERAPEUTIC EXERCISES: CPT

## 2025-01-13 PROCEDURE — 97530 THERAPEUTIC ACTIVITIES: CPT

## 2025-01-15 ENCOUNTER — HOSPITAL ENCOUNTER (OUTPATIENT)
Facility: HOSPITAL | Age: 85
Setting detail: RECURRING SERIES
Discharge: HOME | End: 2025-01-18
Payer: MEDICARE

## 2025-01-15 PROCEDURE — 97110 THERAPEUTIC EXERCISES: CPT

## 2025-01-15 PROCEDURE — 97112 NEUROMUSCULAR REEDUCATION: CPT

## 2025-01-15 PROCEDURE — 97530 THERAPEUTIC ACTIVITIES: CPT

## 2025-01-15 NOTE — PROGRESS NOTES
PHYSICAL / OCCUPATIONAL THERAPY - DAILY TREATMENT NOTE (updated )    Patient Name: Elizabeth Galicia    Date: 1/15/2025    : 1940  Insurance: Payor: MEDICARE / Plan: MEDICARE PART A AND B / Product Type: *No Product type* /      Patient  verified Yes   Visit #   Current / Total 11 16   Time   In / Out 11:00 11:40   Pain   In / Out 0 0   Subjective Functional Status/Changes: Pt reports difficulty with walking and balance.     TREATMENT AREA =  Other abnormalities of gait and mobility [R26.89]    OBJECTIVE         Therapeutic Procedures:  Tx Min Billable or 1:1 Min (if diff from Tx Min) Procedure, Rationale, Specifics   15 15 54234 Therapeutic Exercise (timed):  increase ROM, strength, coordination, balance, and proprioception to improve patient's ability to progress to PLOF and address remaining functional goals. (see flow sheet as applicable)     Details if applicable:       15 15 55603 Neuromuscular Re-Education (timed):  improve balance, coordination, kinesthetic sense, posture, core stability and proprioception to improve patient's ability to develop conscious control of individual muscles and awareness of position of extremities in order to progress to PLOF and address remaining functional goals. (see flow sheet as applicable)     Details if applicable:       06066 Manual Therapy (timed):  decrease pain, increase ROM, and increase tissue extensibility to improve patient's ability to progress to PLOF and address remaining functional goals.  The manual therapy interventions were performed at a separate and distinct time from the therapeutic activities interventions . (see flow sheet as applicable)     Details if applicable:     10 10 69430 Therapeutic Activity (timed):  use of dynamic activities replicating functional movements to increase ROM, strength, coordination, balance, and proprioception in order to improve patient's ability to progress to PLOF and address remaining functional goals.  (see

## 2025-01-17 ENCOUNTER — HOSPITAL ENCOUNTER (OUTPATIENT)
Facility: HOSPITAL | Age: 85
Setting detail: RECURRING SERIES
Discharge: HOME | End: 2025-01-20
Payer: MEDICARE

## 2025-01-17 PROCEDURE — 97110 THERAPEUTIC EXERCISES: CPT

## 2025-01-17 PROCEDURE — 97530 THERAPEUTIC ACTIVITIES: CPT

## 2025-01-17 PROCEDURE — 97112 NEUROMUSCULAR REEDUCATION: CPT

## 2025-01-17 NOTE — PROGRESS NOTES
PHYSICAL / OCCUPATIONAL THERAPY - DAILY TREATMENT NOTE (updated )    Patient Name: Elizabeth Galicia    Date: 2025    : 1940  Insurance: Payor: MEDICARE / Plan: MEDICARE PART A AND B / Product Type: *No Product type* /      Patient  verified Yes   Visit #   Current / Total *** ***   Time   In / Out *** ***   Pain   In / Out *** ***   Subjective Functional Status/Changes: ***     TREATMENT AREA =  Other abnormalities of gait and mobility [R26.89]    OBJECTIVE    {[facility] Modality Grid:51091}     Therapeutic Procedures:  Tx Min Billable or 1:1 Min (if diff from Tx Min) Procedure, Rationale, Specifics   ***  49054 Therapeutic Exercise (timed):  increase ROM, strength, coordination, balance, and proprioception to improve patient's ability to progress to PLOF and address remaining functional goals. (see flow sheet as applicable)     Details if applicable:         01174 Neuromuscular Re-Education (timed):  improve balance, coordination, kinesthetic sense, posture, core stability and proprioception to improve patient's ability to develop conscious control of individual muscles and awareness of position of extremities in order to progress to PLOF and address remaining functional goals. (see flow sheet as applicable)     Details if applicable:       45380 Manual Therapy (timed):  decrease pain, increase ROM, and increase tissue extensibility to improve patient's ability to progress to PLOF and address remaining functional goals.  The manual therapy interventions were performed at a separate and distinct time from the therapeutic activities interventions . (see flow sheet as applicable)     Details if applicable:       64200 Therapeutic Activity (timed):  use of dynamic activities replicating functional movements to increase ROM, strength, coordination, balance, and proprioception in order to improve patient's ability to progress to PLOF and address remaining functional goals.  (see flow sheet as

## 2025-01-17 NOTE — THERAPY DISCHARGE
[facility]   [address], [address], [place] [address] Ph: [phone] Fx: [phone]  DISCHARGE SUMMARY  Patient Name: Elizabeth Galicia : 1940   Treatment/Medical Diagnosis: Other abnormalities of gait and mobility [R26.89]   Referral Source: Radha Tapia MD     Date of Initial Visit: *** Attended Visits: *** Missed Visits: ***     SUMMARY OF TREATMENT  ***    CURRENT STATUS  ***    ***  Status at last Eval: ***  Current Status: ***  Goal Met?  {Yes/No-Ex:805584}    2.  ***  Status at last Eval: ***  Current Status: ***  Goal Met?  {Yes/No-Ex:891795}    3. ***  Status at last Eval: ***  Current Status: ***  Goal Met?  {Yes/No-Ex:980071}      {[facility] MC/nonMC:90149}    RECOMMENDATIONS  {[facility] DC RECOMMENDATIONS:12348}    If you have any questions/comments please contact us directly at [phone].   Thank you for allowing us to assist in the care of your patient.    Martell Saleh, PT       2025       2:15 PM    {[facility] MCAID sig req:37890}

## 2025-05-28 ENCOUNTER — HOSPITAL ENCOUNTER (OUTPATIENT)
Facility: HOSPITAL | Age: 85
Setting detail: RECURRING SERIES
Discharge: HOME | End: 2025-05-31
Payer: MEDICARE

## 2025-05-28 PROCEDURE — 97161 PT EVAL LOW COMPLEX 20 MIN: CPT

## 2025-05-28 PROCEDURE — 97112 NEUROMUSCULAR REEDUCATION: CPT

## 2025-05-28 NOTE — THERAPY EVALUATION
bilaterally    Other tests/ comments: Pt demonstrating decreased overall mobility in R knee as well as decreased balance and showing overall fall risk with dynamic mobility tasks    Pt will benefit from PT interventions to address the aforementioned deficits and allow pt to return to PLOF.    Not post operative, standard auth procedure  ===========================================================================================  Evaluation Complexity:  History:  LOW Complexity : Zero comorbidities / personal factors that will impact the outcome / POC; Examination:  LOW Complexity : 1-2 Standardized tests and measures addressing body structure, function, activity limitation and / or participation in recreation  ;Presentation:  LOW Complexity : Stable, uncomplicated  ; Clinical Decision Making: Lower Extremity Functional Scale (LEFS) = 30 ; (< 40 Moderate to Severe Dysfunction) = HIGH Complexity and Balance Tests FGA  15;  20 - 0 points: HIGH Complexity Overall Complexity Rating: LOW   Problem List: pain affecting function, decrease ROM, decrease ADL/functional abilities, decrease activity tolerance, and decrease flexibility/joint mobility   Treatment Plan may include any combination of the followin Therapeutic Exercise, 52775 Neuromuscular Re-Education, 02250 Manual Therapy, 80204 Therapeutic Activity, 06383 Self Care/Home Management, 40410 Electrical Stim unattended /  (MCR), and 66897 Gait Training  Patient / Family readiness to learn indicated by: asking questions, trying to perform skills, interest, return verbalization , and return demonstration   Persons(s) to be included in education: patient (P)  Barriers to Learning/Limitations: none  Measures taken if barriers to learning present: -  Patient Goal (s): to improve pain  Patient Self Reported Health Status: good  Rehabilitation Potential: fair    Short Term Goals: To be accomplished in  4  weeks:  1. Pt will be independent and compliant with HEP to

## 2025-05-28 NOTE — PROGRESS NOTES
PHYSICAL / OCCUPATIONAL THERAPY - DAILY TREATMENT NOTE (updated )    Patient Name: Elizabeth Galicia    Date: 2025    : 1940  Insurance: Payor: MEDICARE / Plan: MEDICARE PART A AND B / Product Type: *No Product type* /      Patient  verified Yes   Visit #   Current / Total  1    1     16   Time   In / Out 11:40 12:20   Pain   In / Out 4 4   Subjective Functional Status/Changes: See EVal     TREATMENT AREA =  Pain due to internal orthopedic prosthetic devices, implants and grafts, initial encounter  Presence of right artificial knee joint  Pain in right knee  Other abnormalities of gait and mobility    OBJECTIVE         Therapeutic Procedures:  Tx Min Billable or 1:1 Min (if diff from Tx Min) Procedure, Rationale, Specifics     46411 Therapeutic Exercise (timed):  increase ROM, strength, coordination, balance, and proprioception to improve patient's ability to progress to PLOF and address remaining functional goals. (see flow sheet as applicable)     Details if applicable:       10 10 81318 Neuromuscular Re-Education (timed):  improve balance, coordination, kinesthetic sense, posture, core stability and proprioception to improve patient's ability to develop conscious control of individual muscles and awareness of position of extremities in order to progress to PLOF and address remaining functional goals. (see flow sheet as applicable)     Details if applicable:       89677 Manual Therapy (timed):  decrease pain, increase ROM, and increase tissue extensibility to improve patient's ability to progress to PLOF and address remaining functional goals.  The manual therapy interventions were performed at a separate and distinct time from the therapeutic activities interventions . (see flow sheet as applicable)     Details if applicable:       13718 Therapeutic Activity (timed):  use of dynamic activities replicating functional movements to increase ROM, strength, coordination, balance, and proprioception

## 2025-06-02 ENCOUNTER — HOSPITAL ENCOUNTER (OUTPATIENT)
Facility: HOSPITAL | Age: 85
Setting detail: RECURRING SERIES
Discharge: HOME | End: 2025-06-05
Payer: MEDICARE

## 2025-06-02 PROCEDURE — 97112 NEUROMUSCULAR REEDUCATION: CPT

## 2025-06-02 PROCEDURE — 97140 MANUAL THERAPY 1/> REGIONS: CPT

## 2025-06-02 NOTE — PROGRESS NOTES
PHYSICAL / OCCUPATIONAL THERAPY - DAILY TREATMENT NOTE (updated )    Patient Name: Elizabeth Galicia    Date: 2025    : 1940  Insurance: Payor: MEDICARE / Plan: MEDICARE PART A AND B / Product Type: *No Product type* /      Patient  verified Yes   Visit #   Current / Total  2    2     16   Time   In / Out 11:10 11:50   Pain   In / Out 0-5 0-5   Subjective Functional Status/Changes: Pt reports pain in the knee with walking and bending     TREATMENT AREA =  Pain due to internal orthopedic prosthetic devices, implants and grafts, initial encounter  Presence of right artificial knee joint  Pain in right knee  Other abnormalities of gait and mobility    OBJECTIVE    Modalities Rationale:     decrease inflammation and decrease pain to improve patient's ability to progress to PLOF and address remaining functional goals.     min [] Estim Unattended, type/location:                                      []  w/ice    []  w/heat    min [] Estim Attended, type/location:                                     []  w/US     []  w/ice    []  w/heat    []  TENS insruct      min []  Mechanical Traction: type/lbs                   []  pro   []  sup   []  int   []  cont    []  before manual    []  after manual    min []  Ultrasound, settings/location:      min  unbill []  Ice     []  Heat    location/position:     min []  Paraffin,  details:     min []  Vasopneumatic Device, press/temp:     min []  Whirlpool / Fluido:    If using vaso (only need to measure limb vaso being performed on)      pre-treatment girth :       post-treatment girth :       measured at (landmark location) :      min []  Other:    Skin assessment post-treatment:   Intact      Therapeutic Procedures:  Tx Min Billable or 1:1 Min (if diff from Tx Min) Procedure, Rationale, Specifics   5 5 73294 Therapeutic Exercise (timed):  increase ROM, strength, coordination, balance, and proprioception to improve patient's ability to progress to PLOF and address

## 2025-06-04 ENCOUNTER — HOSPITAL ENCOUNTER (OUTPATIENT)
Facility: HOSPITAL | Age: 85
Setting detail: RECURRING SERIES
Discharge: HOME | End: 2025-06-07
Payer: MEDICARE

## 2025-06-04 PROCEDURE — 97112 NEUROMUSCULAR REEDUCATION: CPT

## 2025-06-04 PROCEDURE — 97110 THERAPEUTIC EXERCISES: CPT

## 2025-06-04 NOTE — PROGRESS NOTES
PHYSICAL / OCCUPATIONAL THERAPY - DAILY TREATMENT NOTE (updated )    Patient Name: Elizabeth Galicia    Date: 2025    : 1940  Insurance: Payor: MEDICARE / Plan: MEDICARE PART A AND B / Product Type: *No Product type* /      Patient  verified Yes   Visit #   Current / Total  3    3     16   Time   In / Out 11:45 12:35   Pain   In / Out 4 4   Subjective Functional Status/Changes: Pt reports pain still in the knee with bending. Pt states that she is still having balance issue     TREATMENT AREA =  Pain due to internal orthopedic prosthetic devices, implants and grafts, initial encounter  Presence of right artificial knee joint  Pain in right knee  Other abnormalities of gait and mobility    OBJECTIVE    Modalities Rationale:     decrease inflammation and decrease pain to improve patient's ability to progress to PLOF and address remaining functional goals.     min [] Estim Unattended, type/location:                                      []  w/ice    []  w/heat    min [] Estim Attended, type/location:                                     []  w/US     []  w/ice    []  w/heat    []  TENS insruct      min []  Mechanical Traction: type/lbs                   []  pro   []  sup   []  int   []  cont    []  before manual    []  after manual    min []  Ultrasound, settings/location:      min  unbill []  Ice     []  Heat    location/position:     min []  Paraffin,  details:     min []  Vasopneumatic Device, press/temp:     min []  Whirlpool / Fluido:    If using vaso (only need to measure limb vaso being performed on)      pre-treatment girth :       post-treatment girth :       measured at (landmark location) :      min []  Other:    Skin assessment post-treatment:   Intact      Therapeutic Procedures:  Tx Min Billable or 1:1 Min (if diff from Tx Min) Procedure, Rationale, Specifics   25 95 61832 Therapeutic Exercise (timed):  increase ROM, strength, coordination, balance, and proprioception to improve patient's

## 2025-06-06 ENCOUNTER — HOSPITAL ENCOUNTER (OUTPATIENT)
Facility: HOSPITAL | Age: 85
Setting detail: RECURRING SERIES
Discharge: HOME | End: 2025-06-09
Payer: MEDICARE

## 2025-06-06 PROCEDURE — 97110 THERAPEUTIC EXERCISES: CPT

## 2025-06-06 PROCEDURE — 97140 MANUAL THERAPY 1/> REGIONS: CPT

## 2025-06-06 PROCEDURE — 97112 NEUROMUSCULAR REEDUCATION: CPT

## 2025-06-06 NOTE — PROGRESS NOTES
PHYSICAL / OCCUPATIONAL THERAPY - DAILY TREATMENT NOTE (updated )    Patient Name: Elizabeth Galicia    Date: 2025    : 1940  Insurance: Payor: MEDICARE / Plan: MEDICARE PART A AND B / Product Type: *No Product type* /      Patient  verified Yes   Visit #   Current / Total  4    4     16   Time   In / Out 11:00 11:50   Pain   In / Out 0 0   Subjective Functional Status/Changes: Pt reports less pain today. Pain with deep knee bends. Pt reports balance hasn't been great     TREATMENT AREA =  Pain due to internal orthopedic prosthetic devices, implants and grafts, initial encounter  Presence of right artificial knee joint  Pain in right knee  Other abnormalities of gait and mobility    OBJECTIVE    Modalities Rationale:     decrease inflammation and decrease pain to improve patient's ability to progress to PLOF and address remaining functional goals.     min [] Estim Unattended, type/location:                                      []  w/ice    []  w/heat    min [] Estim Attended, type/location:                                     []  w/US     []  w/ice    []  w/heat    []  TENS insruct      min []  Mechanical Traction: type/lbs                   []  pro   []  sup   []  int   []  cont    []  before manual    []  after manual    min []  Ultrasound, settings/location:      min  unbill []  Ice     []  Heat    location/position:     min []  Paraffin,  details:     min []  Vasopneumatic Device, press/temp:     min []  Whirlpool / Fluido:    If using vaso (only need to measure limb vaso being performed on)      pre-treatment girth :       post-treatment girth :       measured at (landmark location) :      min []  Other:    Skin assessment post-treatment:   Intact      Therapeutic Procedures:  Tx Min Billable or 1:1 Min (if diff from Tx Min) Procedure, Rationale, Specifics   15 15 02421 Therapeutic Exercise (timed):  increase ROM, strength, coordination, balance, and proprioception to improve patient's ability

## 2025-06-09 ENCOUNTER — HOSPITAL ENCOUNTER (OUTPATIENT)
Facility: HOSPITAL | Age: 85
Setting detail: RECURRING SERIES
Discharge: HOME | End: 2025-06-12
Payer: MEDICARE

## 2025-06-09 PROCEDURE — 97112 NEUROMUSCULAR REEDUCATION: CPT

## 2025-06-09 PROCEDURE — 97110 THERAPEUTIC EXERCISES: CPT

## 2025-06-09 NOTE — PROGRESS NOTES
functional mobility deficits, analyze and address ROM deficits, analyze and address strength deficits, and analyze and address soft tissue restrictions to address functional deficits and attain remaining goals.    Progress toward goals / Updated goals:  []  See Progress Note/Recertification    Good Progress to    [] STG    [x] LTG  1 as shown by improved overall pain levels with ADLs      Next PN/ RC due 6/28/2025  Auth due CLARENCE    PLAN  Yes Continue plan of care  [x]  Upgrade activities as tolerated  []  Discharge due to :  []  Other:    Martell Saleh PT    6/9/2025    2:16 PM    Future Appointments   Date Time Provider Department Center   6/11/2025 11:40 AM Martell Saleh PT [facility]   6/13/2025 11:00 AM Martell Saleh, PT [facility]   6/16/2025 11:40 AM Martell Saleh, PT [facility]   6/20/2025 11:00 AM Martell Saleh PT [facility]   6/23/2025 11:00 AM Martell Saleh PT [facility]   6/25/2025 11:00 AM Martell Saleh PT [facility]   7/2/2025 11:00 AM Martell Saleh PT [facility]   7/7/2025 11:00 AM Martell Saleh, PT [facility]   7/9/2025 11:00 AM Martell Saleh PT [facility]   7/11/2025 11:00 AM Martell Saleh, PT [facility]

## 2025-06-11 ENCOUNTER — HOSPITAL ENCOUNTER (OUTPATIENT)
Facility: HOSPITAL | Age: 85
Setting detail: RECURRING SERIES
Discharge: HOME | End: 2025-06-14
Payer: MEDICARE

## 2025-06-11 PROCEDURE — 97110 THERAPEUTIC EXERCISES: CPT

## 2025-06-11 PROCEDURE — 97530 THERAPEUTIC ACTIVITIES: CPT

## 2025-06-11 PROCEDURE — 97112 NEUROMUSCULAR REEDUCATION: CPT

## 2025-06-11 NOTE — PROGRESS NOTES
progress to PLOF and address remaining functional goals. (see flow sheet as applicable)     Details if applicable:       15 15 87767 Neuromuscular Re-Education (timed):  improve balance, coordination, kinesthetic sense, posture, core stability and proprioception to improve patient's ability to develop conscious control of individual muscles and awareness of position of extremities in order to progress to PLOF and address remaining functional goals. (see flow sheet as applicable)     Details if applicable:     10 10 16904 Manual Therapy (timed):  decrease pain, increase ROM, and increase tissue extensibility to improve patient's ability to progress to PLOF and address remaining functional goals.  The manual therapy interventions were performed at a separate and distinct time from the therapeutic activities interventions . (see flow sheet as applicable)     Details if applicable:  PROM to R knee      67601 Therapeutic Activity (timed):  use of dynamic activities replicating functional movements to increase ROM, strength, coordination, balance, and proprioception in order to improve patient's ability to progress to PLOF and address remaining functional goals.  (see flow sheet as applicable)     Details if applicable:            Details if applicable:     40 40 [facility] Totals Reminder: bill using total billable min of TIMED therapeutic procedures (example: do not include dry needle or estim unattended, both untimed codes, in totals to left)  8-22 min = 1 unit; 23-37 min = 2 units; 38-52 min = 3 units; 53-67 min = 4 units; 68-82 min = 5 units   Total Total     [x]  Patient Education billed concurrently with other procedures   [x] Review HEP    [] Progressed/Changed HEP, detail:    [] Other detail:       Objective Information/Functional Measures/Assessment    Pt demonstrating decreased dynamic balance    Patient will continue to benefit from skilled PT / OT services to modify and progress therapeutic interventions, analyze

## 2025-06-13 ENCOUNTER — HOSPITAL ENCOUNTER (OUTPATIENT)
Facility: HOSPITAL | Age: 85
Setting detail: RECURRING SERIES
Discharge: HOME | End: 2025-06-16
Payer: MEDICARE

## 2025-06-13 PROCEDURE — 97530 THERAPEUTIC ACTIVITIES: CPT

## 2025-06-13 PROCEDURE — 97110 THERAPEUTIC EXERCISES: CPT

## 2025-06-13 PROCEDURE — 97112 NEUROMUSCULAR REEDUCATION: CPT

## 2025-06-13 NOTE — PROGRESS NOTES
to progress to PLOF and address remaining functional goals. (see flow sheet as applicable)     Details if applicable:       15 15 65994 Neuromuscular Re-Education (timed):  improve balance, coordination, kinesthetic sense, posture, core stability and proprioception to improve patient's ability to develop conscious control of individual muscles and awareness of position of extremities in order to progress to PLOF and address remaining functional goals. (see flow sheet as applicable)     Details if applicable:     10 10 57009 Manual Therapy (timed):  decrease pain, increase ROM, and increase tissue extensibility to improve patient's ability to progress to PLOF and address remaining functional goals.  The manual therapy interventions were performed at a separate and distinct time from the therapeutic activities interventions . (see flow sheet as applicable)     Details if applicable:       83341 Therapeutic Activity (timed):  use of dynamic activities replicating functional movements to increase ROM, strength, coordination, balance, and proprioception in order to improve patient's ability to progress to PLOF and address remaining functional goals.  (see flow sheet as applicable)     Details if applicable:            Details if applicable:     40 40 [facility] Totals Reminder: bill using total billable min of TIMED therapeutic procedures (example: do not include dry needle or estim unattended, both untimed codes, in totals to left)  8-22 min = 1 unit; 23-37 min = 2 units; 38-52 min = 3 units; 53-67 min = 4 units; 68-82 min = 5 units   Total Total     [x]  Patient Education billed concurrently with other procedures   [x] Review HEP    [] Progressed/Changed HEP, detail:    [] Other detail:       Objective Information/Functional Measures/Assessment    Pt demonstrating decreased dynamic balance. Pt required CGA for most activities    Patient will continue to benefit from skilled PT / OT services to modify and progress

## 2025-06-16 ENCOUNTER — HOSPITAL ENCOUNTER (OUTPATIENT)
Facility: HOSPITAL | Age: 85
Setting detail: RECURRING SERIES
Discharge: HOME | End: 2025-06-19
Payer: MEDICARE

## 2025-06-16 PROCEDURE — 97140 MANUAL THERAPY 1/> REGIONS: CPT

## 2025-06-16 PROCEDURE — 97110 THERAPEUTIC EXERCISES: CPT

## 2025-06-16 PROCEDURE — 97112 NEUROMUSCULAR REEDUCATION: CPT

## 2025-06-16 NOTE — PROGRESS NOTES
PHYSICAL / OCCUPATIONAL THERAPY - DAILY TREATMENT NOTE (updated )    Patient Name: Elizabeth Galicia    Date: 2025    : 1940  Insurance: Payor: MEDICARE / Plan: MEDICARE PART A AND B / Product Type: *No Product type* /      Patient  verified Yes   Visit #   Current / Total  8    8     16   Time   In / Out 11:40 12:30   Pain   In / Out 0-4 0-4   Subjective Functional Status/Changes: Pt reports still having stiffness in the R knee. Pt showing decreased static and dynamic balance     TREATMENT AREA =  Pain due to internal orthopedic prosthetic devices, implants and grafts, initial encounter  Presence of right artificial knee joint  Pain in right knee  Other abnormalities of gait and mobility    OBJECTIVE    Modalities Rationale:     decrease inflammation and decrease pain to improve patient's ability to progress to PLOF and address remaining functional goals.     min [] Estim Unattended, type/location:                                      []  w/ice    []  w/heat    min [] Estim Attended, type/location:                                     []  w/US     []  w/ice    []  w/heat    []  TENS insruct      min []  Mechanical Traction: type/lbs                   []  pro   []  sup   []  int   []  cont    []  before manual    []  after manual    min []  Ultrasound, settings/location:      min  unbill []  Ice     []  Heat    location/position:     min []  Paraffin,  details:     min []  Vasopneumatic Device, press/temp:     min []  Whirlpool / Fluido:    If using vaso (only need to measure limb vaso being performed on)      pre-treatment girth :       post-treatment girth :       measured at (landmark location) :      min []  Other:    Skin assessment post-treatment:   Intact      Therapeutic Procedures:  Tx Min Billable or 1:1 Min (if diff from Tx Min) Procedure, Rationale, Specifics   15 15 62013 Therapeutic Exercise (timed):  increase ROM, strength, coordination, balance, and proprioception to improve

## 2025-06-18 ENCOUNTER — APPOINTMENT (OUTPATIENT)
Facility: HOSPITAL | Age: 85
End: 2025-06-18
Payer: MEDICARE

## 2025-06-19 ENCOUNTER — APPOINTMENT (OUTPATIENT)
Facility: HOSPITAL | Age: 85
End: 2025-06-19
Payer: MEDICARE

## 2025-06-20 ENCOUNTER — APPOINTMENT (OUTPATIENT)
Facility: HOSPITAL | Age: 85
End: 2025-06-20
Payer: MEDICARE

## 2025-06-23 ENCOUNTER — APPOINTMENT (OUTPATIENT)
Facility: HOSPITAL | Age: 85
End: 2025-06-23
Payer: MEDICARE

## 2025-06-25 ENCOUNTER — HOSPITAL ENCOUNTER (OUTPATIENT)
Facility: HOSPITAL | Age: 85
Setting detail: RECURRING SERIES
Discharge: HOME | End: 2025-06-28
Payer: MEDICARE

## 2025-06-25 PROCEDURE — 97112 NEUROMUSCULAR REEDUCATION: CPT

## 2025-06-25 PROCEDURE — 97110 THERAPEUTIC EXERCISES: CPT

## 2025-06-25 PROCEDURE — 97530 THERAPEUTIC ACTIVITIES: CPT

## 2025-06-25 NOTE — PROGRESS NOTES
PHYSICAL / OCCUPATIONAL THERAPY - DAILY TREATMENT NOTE (updated )    Patient Name: Elizabeth Galicia    Date: 2025    : 1940  Insurance: Payor: MEDICARE / Plan: MEDICARE PART A AND B / Product Type: *No Product type* /      Patient  verified Yes   Visit #   Current / Total  9    9     10   Time   In / Out 11:00 11:50   Pain   In / Out 0 0   Subjective Functional Status/Changes: Pt report difficulty bending the knee. Pt reports also having difficulty with balanc     TREATMENT AREA =  Pain due to internal orthopedic prosthetic devices, implants and grafts, initial encounter  Presence of right artificial knee joint  Pain in right knee  Other abnormalities of gait and mobility    OBJECTIVE    Modalities Rationale:     decrease inflammation and decrease pain to improve patient's ability to progress to PLOF and address remaining functional goals.     min [] Estim Unattended, type/location:                                      []  w/ice    []  w/heat    min [] Estim Attended, type/location:                                     []  w/US     []  w/ice    []  w/heat    []  TENS insruct      min []  Mechanical Traction: type/lbs                   []  pro   []  sup   []  int   []  cont    []  before manual    []  after manual    min []  Ultrasound, settings/location:      min  unbill []  Ice     []  Heat    location/position:     min []  Paraffin,  details:     min []  Vasopneumatic Device, press/temp:     min []  Whirlpool / Fluido:    If using vaso (only need to measure limb vaso being performed on)      pre-treatment girth :       post-treatment girth :       measured at (landmark location) :      min []  Other:    Skin assessment post-treatment:   Intact      Therapeutic Procedures:  Tx Min Billable or 1:1 Min (if diff from Tx Min) Procedure, Rationale, Specifics   15 15 88998 Therapeutic Exercise (timed):  increase ROM, strength, coordination, balance, and proprioception to improve patient's ability to

## 2025-06-27 ENCOUNTER — APPOINTMENT (OUTPATIENT)
Facility: HOSPITAL | Age: 85
End: 2025-06-27
Payer: MEDICARE

## 2025-06-30 ENCOUNTER — APPOINTMENT (OUTPATIENT)
Facility: HOSPITAL | Age: 85
End: 2025-06-30
Payer: MEDICARE

## 2025-07-02 ENCOUNTER — HOSPITAL ENCOUNTER (OUTPATIENT)
Facility: HOSPITAL | Age: 85
Setting detail: RECURRING SERIES
Discharge: HOME | End: 2025-07-05
Payer: MEDICARE

## 2025-07-02 PROCEDURE — 97530 THERAPEUTIC ACTIVITIES: CPT

## 2025-07-02 PROCEDURE — 97112 NEUROMUSCULAR REEDUCATION: CPT

## 2025-07-02 PROCEDURE — 97110 THERAPEUTIC EXERCISES: CPT

## 2025-07-02 NOTE — THERAPY RECERTIFICATION
[facility]AD[facility]   [address], [address], [place] [address] Ph: [phone] Fx: [phone]  PHYSICAL THERAPY PROGRESS NOTE  Patient Name: Elizabeth Galicia : 1940   Treatment/Medical Diagnosis: Pain due to internal orthopedic prosthetic devices, implants and grafts, initial encounter  Presence of right artificial knee joint  Pain in right knee  Other abnormalities of gait and mobility   Referral Source: Slava Barreto MD     Date of Initial Visit: 2025 Attended Visits: 10 Missed Visits: -     SUMMARY OF TREATMENT  PT consisted of manual therapy techniques, therapeutic exercises, and modalities to improve strength, improve mobility, decrease pain, and improve overall function.       CURRENT STATUS  Pt showing good overall improvement. FGA 17/30. Pt demonstrating increased pain with knee flexion. Pt still demonstrating decreased overall dynamic balance especially with tasks such as horizontal head turns and vertical head turns which are common during community ambulation.     Primary mitigating factor which impeded progress:  None of these apply from preset list (refer to note for other details)      Increase score on LEFS to > or = to 45 points to demo an increase in functional activity tolerance with the LE   Status at last Eval: see eval  Current Status: DNA  Goal Met?  no    2.  Pt will note < or = 2/10 pain with all mobility to improve comfort with ADL’s   Status at last Eval: see eval  Current Status: 0-3  Goal Met?  no    3. Pt will demonstrate a GROC score of >/= +5 to show overall improvement in function   Status at last Eval: NA  Current Status: DNA  Goal Met?  no    4. Pt demonstrate a FGA score >/= 23/30 to show improved overall balance needed to reduce the risk of falls with ADLs   Status at last Eval: 15  Current Status: 17  Goal Met?  no      Payor: MEDICARE / Plan: MEDICARE PART A AND B / Product Type: *No Product type* /

## 2025-07-02 NOTE — PROGRESS NOTES
PHYSICAL / OCCUPATIONAL THERAPY - DAILY TREATMENT NOTE (updated )    Patient Name: Elizabeth Galicia    Date: 2025    : 1940  Insurance: Payor: MEDICARE / Plan: MEDICARE PART A AND B / Product Type: *No Product type* /      Patient  verified Yes   Visit #   Current / Total  10    10     16   Time   In / Out 11:00 11:50   Pain   In / Out 0-3 0-3   Subjective Functional Status/Changes: See PN     TREATMENT AREA =  Pain due to internal orthopedic prosthetic devices, implants and grafts, initial encounter  Presence of right artificial knee joint  Pain in right knee  Other abnormalities of gait and mobility    OBJECTIVE    Modalities Rationale:     decrease inflammation and decrease pain to improve patient's ability to progress to PLOF and address remaining functional goals.     min [] Estim Unattended, type/location:                                      []  w/ice    []  w/heat    min [] Estim Attended, type/location:                                     []  w/US     []  w/ice    []  w/heat    []  TENS insruct      min []  Mechanical Traction: type/lbs                   []  pro   []  sup   []  int   []  cont    []  before manual    []  after manual    min []  Ultrasound, settings/location:     10 min  unbill [x]  Ice     []  Heat    location/position: R knee    min []  Paraffin,  details:     min []  Vasopneumatic Device, press/temp:     min []  Whirlpool / Fluido:    If using vaso (only need to measure limb vaso being performed on)      pre-treatment girth :       post-treatment girth :       measured at (landmark location) :      min []  Other:    Skin assessment post-treatment:   Intact      Therapeutic Procedures:  Tx Min Billable or 1:1 Min (if diff from Tx Min) Procedure, Rationale, Specifics   15 15 [id number] Therapeutic Exercise (timed):  increase ROM, strength, coordination, balance, and proprioception to improve patient's ability to progress to PLOF and address remaining functional goals. (see flow

## 2025-07-07 ENCOUNTER — APPOINTMENT (OUTPATIENT)
Facility: HOSPITAL | Age: 85
End: 2025-07-07
Payer: MEDICARE

## 2025-07-09 ENCOUNTER — TELEPHONE (OUTPATIENT)
Facility: HOSPITAL | Age: 85
End: 2025-07-09

## 2025-07-09 ENCOUNTER — APPOINTMENT (OUTPATIENT)
Facility: HOSPITAL | Age: 85
End: 2025-07-09
Payer: MEDICARE

## 2025-07-09 NOTE — TELEPHONE ENCOUNTER
CXL <24hrs. Patient is unable to make today's appt. States that she did not get home until 4am from being out of town. Patient has been reminded of her upcoming appt.

## 2025-07-11 ENCOUNTER — HOSPITAL ENCOUNTER (OUTPATIENT)
Facility: HOSPITAL | Age: 85
Setting detail: RECURRING SERIES
Discharge: HOME | End: 2025-07-14
Payer: MEDICARE

## 2025-07-11 PROCEDURE — 97530 THERAPEUTIC ACTIVITIES: CPT

## 2025-07-11 PROCEDURE — 97110 THERAPEUTIC EXERCISES: CPT

## 2025-07-11 PROCEDURE — 97112 NEUROMUSCULAR REEDUCATION: CPT

## 2025-07-11 NOTE — PROGRESS NOTES
PHYSICAL / OCCUPATIONAL THERAPY - DAILY TREATMENT NOTE (updated )    Patient Name: Elizabeth Galicia    Date: 2025    : 1940  Insurance: Payor: MEDICARE / Plan: MEDICARE PART A AND B / Product Type: *No Product type* /      Patient  verified Yes   Visit #   Current / Total      11     16   Time   In / Out 11:00 11:50   Pain   In / Out 00 00   Subjective Functional Status/Changes: Pt reports stiffness in the R knee. Pt states difficulty with balance activities still     TREATMENT AREA =  Pain due to internal orthopedic prosthetic devices, implants and grafts, initial encounter  Presence of right artificial knee joint  Pain in right knee  Other abnormalities of gait and mobility    OBJECTIVE    Modalities Rationale:     decrease inflammation and decrease pain to improve patient's ability to progress to PLOF and address remaining functional goals.     min [] Estim Unattended, type/location:                                      []  w/ice    []  w/heat    min [] Estim Attended, type/location:                                     []  w/US     []  w/ice    []  w/heat    []  TENS insruct      min []  Mechanical Traction: type/lbs                   []  pro   []  sup   []  int   []  cont    []  before manual    []  after manual    min []  Ultrasound, settings/location:     10 min  unbill [x]  Ice     []  Heat    location/position: R knee    min []  Paraffin,  details:     min []  Vasopneumatic Device, press/temp:     min []  Whirlpool / Fluido:    If using vaso (only need to measure limb vaso being performed on)      pre-treatment girth :       post-treatment girth :       measured at (landmark location) :      min []  Other:    Skin assessment post-treatment:   Intact      Therapeutic Procedures:  Tx Min Billable or 1:1 Min (if diff from Tx Min) Procedure, Rationale, Specifics   15 15 [id number] Therapeutic Exercise (timed):  increase ROM, strength, coordination, balance, and proprioception to improve patient's

## 2025-07-14 ENCOUNTER — TELEPHONE (OUTPATIENT)
Facility: HOSPITAL | Age: 85
End: 2025-07-14

## 2025-07-14 ENCOUNTER — APPOINTMENT (OUTPATIENT)
Facility: HOSPITAL | Age: 85
End: 2025-07-14
Payer: MEDICARE

## 2025-07-14 NOTE — TELEPHONE ENCOUNTER
CXL >24hrs. Patient has requested to cancel today's appt via VM, states that she will call back to reschedule.

## 2025-07-16 ENCOUNTER — HOSPITAL ENCOUNTER (OUTPATIENT)
Facility: HOSPITAL | Age: 85
Setting detail: RECURRING SERIES
Discharge: HOME | End: 2025-07-19
Payer: MEDICARE

## 2025-07-16 PROCEDURE — 97112 NEUROMUSCULAR REEDUCATION: CPT

## 2025-07-16 PROCEDURE — 97110 THERAPEUTIC EXERCISES: CPT

## 2025-07-16 PROCEDURE — 97530 THERAPEUTIC ACTIVITIES: CPT

## 2025-07-16 NOTE — PROGRESS NOTES
xfPHYSICAL / OCCUPATIONAL THERAPY - DAILY TREATMENT NOTE (updated )    Patient Name: Elizabeth Galicia    Date: 2025    : 1940  Insurance: Payor: MEDICARE / Plan: MEDICARE PART A AND B / Product Type: *No Product type* /      Patient  verified Yes   Visit #   Current / Total  12    12     16   Time   In / Out 11:00 11:50   Pain   In / Out 0-2 0-2   Subjective Functional Status/Changes: Pt reports having stiffness in the knee. Pt reprots still having some difficulty with balance     TREATMENT AREA =  Pain due to internal orthopedic prosthetic devices, implants and grafts, initial encounter  Presence of right artificial knee joint  Pain in right knee  Other abnormalities of gait and mobility    OBJECTIVE    Modalities Rationale:     decrease inflammation and decrease pain to improve patient's ability to progress to PLOF and address remaining functional goals.     min [] Estim Unattended, type/location:                                      []  w/ice    []  w/heat    min [] Estim Attended, type/location:                                     []  w/US     []  w/ice    []  w/heat    []  TENS insruct      min []  Mechanical Traction: type/lbs                   []  pro   []  sup   []  int   []  cont    []  before manual    []  after manual    min []  Ultrasound, settings/location:      min  unbill []  Ice     []  Heat    location/position:     min []  Paraffin,  details:     min []  Vasopneumatic Device, press/temp:     min []  Whirlpool / Fluido:    If using vaso (only need to measure limb vaso being performed on)      pre-treatment girth :       post-treatment girth :       measured at (landmark location) :      min []  Other:    Skin assessment post-treatment:   Intact      Therapeutic Procedures:  Tx Min Billable or 1:1 Min (if diff from Tx Min) Procedure, Rationale, Specifics   15 15 [id number] Therapeutic Exercise (timed):  increase ROM, strength, coordination, balance, and proprioception to improve

## 2025-07-18 ENCOUNTER — HOSPITAL ENCOUNTER (OUTPATIENT)
Facility: HOSPITAL | Age: 85
Setting detail: RECURRING SERIES
Discharge: HOME | End: 2025-07-21
Payer: MEDICARE

## 2025-07-18 PROCEDURE — 97112 NEUROMUSCULAR REEDUCATION: CPT

## 2025-07-18 PROCEDURE — 97110 THERAPEUTIC EXERCISES: CPT

## 2025-07-18 NOTE — PROGRESS NOTES
continue to benefit from skilled PT / OT services to modify and progress therapeutic interventions, analyze and address functional mobility deficits, analyze and address ROM deficits, analyze and address strength deficits, and analyze and address soft tissue restrictions to address functional deficits and attain remaining goals.    Progress toward goals / Updated goals:  []  See Progress Note/Recertification    Good Progress to    [] STG    [x] LTG  1 as shown by improved overall pain levels with ADLs      Next PN/ RC due 8/2/2025  Auth due CLARENCE    PLAN  Yes Continue plan of care  [x]  Upgrade activities as tolerated  []  Discharge due to :  []  Other:    Martell Saleh PT    7/18/2025    2:42 PM    Future Appointments   Date Time Provider Department Center   8/4/2025 11:00 AM Martell Saleh PT [facility]   8/6/2025 11:00 AM Martell Salhe PT [facility]   8/8/2025 11:00 AM Martell Saleh PT [facility]   8/27/2025 11:00 AM Martell Saleh PT [facility]   8/29/2025 11:00 AM Martell Saleh PT [facility]

## 2025-08-06 ENCOUNTER — HOSPITAL ENCOUNTER (OUTPATIENT)
Facility: HOSPITAL | Age: 85
Setting detail: RECURRING SERIES
Discharge: HOME | End: 2025-08-09
Payer: MEDICARE

## 2025-08-06 PROCEDURE — 97112 NEUROMUSCULAR REEDUCATION: CPT

## 2025-08-06 PROCEDURE — 97110 THERAPEUTIC EXERCISES: CPT

## 2025-08-06 PROCEDURE — 97530 THERAPEUTIC ACTIVITIES: CPT

## 2025-08-08 ENCOUNTER — APPOINTMENT (OUTPATIENT)
Facility: HOSPITAL | Age: 85
End: 2025-08-08
Payer: MEDICARE

## 2025-08-27 ENCOUNTER — HOSPITAL ENCOUNTER (OUTPATIENT)
Facility: HOSPITAL | Age: 85
Setting detail: RECURRING SERIES
Discharge: HOME | End: 2025-08-30
Payer: MEDICARE

## 2025-08-27 PROCEDURE — 97112 NEUROMUSCULAR REEDUCATION: CPT

## 2025-08-27 PROCEDURE — 97530 THERAPEUTIC ACTIVITIES: CPT

## 2025-08-27 PROCEDURE — 97110 THERAPEUTIC EXERCISES: CPT

## 2025-08-29 ENCOUNTER — HOSPITAL ENCOUNTER (OUTPATIENT)
Facility: HOSPITAL | Age: 85
Setting detail: RECURRING SERIES
Discharge: HOME | End: 2025-09-01
Payer: MEDICARE

## 2025-08-29 PROCEDURE — 97112 NEUROMUSCULAR REEDUCATION: CPT

## 2025-08-29 PROCEDURE — 97110 THERAPEUTIC EXERCISES: CPT

## 2025-08-29 PROCEDURE — 97530 THERAPEUTIC ACTIVITIES: CPT
